# Patient Record
Sex: MALE | Race: WHITE | NOT HISPANIC OR LATINO | Employment: OTHER | URBAN - METROPOLITAN AREA
[De-identification: names, ages, dates, MRNs, and addresses within clinical notes are randomized per-mention and may not be internally consistent; named-entity substitution may affect disease eponyms.]

---

## 2017-03-06 ENCOUNTER — HOSPITAL ENCOUNTER (OUTPATIENT)
Dept: PAIN MEDICINE | Facility: HOSPITAL | Age: 67
Discharge: HOME | End: 2017-03-06
Attending: PAIN MEDICINE | Admitting: PAIN MEDICINE

## 2017-03-27 ENCOUNTER — HOSPITAL ENCOUNTER (OUTPATIENT)
Dept: PAIN MEDICINE | Facility: HOSPITAL | Age: 67
Discharge: HOME | End: 2017-03-27
Attending: PAIN MEDICINE | Admitting: PAIN MEDICINE

## 2017-04-10 ENCOUNTER — HOSPITAL ENCOUNTER (OUTPATIENT)
Dept: PAIN MEDICINE | Facility: HOSPITAL | Age: 67
Discharge: HOME | End: 2017-04-10
Attending: PAIN MEDICINE | Admitting: PAIN MEDICINE

## 2017-04-24 ENCOUNTER — HOSPITAL ENCOUNTER (OUTPATIENT)
Dept: PAIN MEDICINE | Facility: HOSPITAL | Age: 67
Discharge: HOME | End: 2017-04-24
Attending: PAIN MEDICINE | Admitting: PAIN MEDICINE

## 2017-05-08 ENCOUNTER — HOSPITAL ENCOUNTER (OUTPATIENT)
Dept: PAIN MEDICINE | Facility: HOSPITAL | Age: 67
Discharge: HOME | End: 2017-05-08
Attending: PAIN MEDICINE | Admitting: PAIN MEDICINE

## 2017-07-11 ENCOUNTER — HOSPITAL ENCOUNTER (OUTPATIENT)
Dept: FAMILY MEDICINE CLINIC | Facility: CLINIC | Age: 67
Setting detail: SPECIMEN
Discharge: HOME | End: 2017-07-11
Attending: INTERNAL MEDICINE | Admitting: INTERNAL MEDICINE

## 2017-07-11 LAB
ALBUMIN SERPL-MCNC: 4 G/DL (ref 3.5–4.8)
ALBUMIN/GLOB SERPL: 1.3 {RATIO} (ref 1–1.7)
ALP SERPL-CCNC: 61 IU/L (ref 32–91)
ALT SERPL-CCNC: 36 IU/L (ref 17–63)
ANION GAP SERPL CALC-SCNC: 15.5 MMOL/L (ref 10–20)
AST SERPL-CCNC: 33 IU/L (ref 15–41)
BASOPHILS # BLD AUTO: 0.1 10*3/UL (ref 0–0.2)
BASOPHILS NFR BLD AUTO: 1 % (ref 0–2)
BILIRUB SERPL-MCNC: 0.7 MG/DL (ref 0.3–1.2)
BUN SERPL-MCNC: 21 MG/DL (ref 8–20)
BUN/CREAT SERPL: 21 (ref 6.2–20.3)
CALCIUM SERPL-MCNC: 9.6 MG/DL (ref 8.9–10.3)
CHLORIDE SERPL-SCNC: 98 MMOL/L (ref 101–111)
CHOLEST SERPL-MCNC: 203 MG/DL
CHOLEST/HDLC SERPL: 5.1 {RATIO}
CONV CO2: 28 MMOL/L (ref 22–32)
CONV LDL CHOLESTEROL DIRECT: 116 MG/DL (ref 0–100)
CONV TOTAL PROTEIN: 7.2 G/DL (ref 6.1–7.9)
CREAT UR-MCNC: 1 MG/DL (ref 0.7–1.2)
DIFFERENTIAL METHOD BLD: (no result)
EOSINOPHIL # BLD AUTO: 0.3 10*3/UL (ref 0–0.3)
EOSINOPHIL # BLD AUTO: 4 % (ref 0–3)
ERYTHROCYTE [DISTWIDTH] IN BLOOD BY AUTOMATED COUNT: 18 % (ref 11.5–14.5)
GLOBULIN UR ELPH-MCNC: 3.2 G/DL (ref 2.5–3.8)
GLUCOSE SERPL-MCNC: 99 MG/DL (ref 65–99)
HCT VFR BLD AUTO: 37.7 % (ref 40–54)
HDLC SERPL-MCNC: 40 MG/DL
HGB BLD-MCNC: 12.8 G/DL (ref 14–18)
LDLC/HDLC SERPL: 2.9 {RATIO}
LIPID INTERPRETATION: ABNORMAL
LYMPHOCYTES # BLD AUTO: 1.8 10*3/UL (ref 0.8–4.8)
LYMPHOCYTES NFR BLD AUTO: 23 % (ref 18–42)
MCH RBC QN AUTO: 30 PG (ref 26–32)
MCHC RBC AUTO-ENTMCNC: 33.9 G/DL (ref 32–36)
MCV RBC AUTO: 88.5 FL (ref 80–94)
MONOCYTES # BLD AUTO: 0.8 10*3/UL (ref 0.1–1.3)
MONOCYTES NFR BLD AUTO: 10 % (ref 2–11)
NEUTROPHILS # BLD AUTO: 4.8 10*3/UL (ref 2.3–8.6)
NEUTROPHILS NFR BLD AUTO: 62 % (ref 50–75)
NRBC BLD AUTO-RTO: 0 /100{WBCS}
NRBC/RBC NFR BLD MANUAL: 0 10*3/UL
PLATELET # BLD AUTO: 209 10*3/UL (ref 150–450)
PMV BLD AUTO: 7.8 FL (ref 7.4–10.4)
POTASSIUM SERPL-SCNC: 4.5 MMOL/L (ref 3.6–5.1)
RBC # BLD AUTO: 4.25 10*6/UL (ref 4.6–6)
SODIUM SERPL-SCNC: 137 MMOL/L (ref 136–144)
T3 SERPL-MCNC: 0.99 NG/ML (ref 0.87–1.78)
T4 FREE SERPL-MCNC: 1.01 NG/DL (ref 0.58–1.64)
TRIGL SERPL-MCNC: 345 MG/DL
TSH SERPL-ACNC: 0.75 UIU/ML (ref 0.34–5.6)
VLDLC SERPL CALC-MCNC: 47 MG/DL
WBC # BLD AUTO: 7.7 10*3/UL (ref 4.5–11.5)

## 2017-08-02 ENCOUNTER — HOSPITAL ENCOUNTER (OUTPATIENT)
Dept: FAMILY MEDICINE CLINIC | Facility: CLINIC | Age: 67
Setting detail: SPECIMEN
Discharge: HOME | End: 2017-08-02
Attending: INTERNAL MEDICINE | Admitting: INTERNAL MEDICINE

## 2017-08-02 LAB
ALBUMIN SERPL-MCNC: 3.8 G/DL (ref 3.5–4.8)
ALBUMIN/GLOB SERPL: 1.2 {RATIO} (ref 1–1.7)
ALP SERPL-CCNC: 65 IU/L (ref 32–91)
ALT SERPL-CCNC: 42 IU/L (ref 17–63)
ANION GAP SERPL CALC-SCNC: 14.6 MMOL/L (ref 10–20)
AST SERPL-CCNC: 29 IU/L (ref 15–41)
BASOPHILS # BLD AUTO: 0 10*3/UL (ref 0–0.2)
BASOPHILS NFR BLD AUTO: 1 % (ref 0–2)
BILIRUB SERPL-MCNC: 0.8 MG/DL (ref 0.3–1.2)
BUN SERPL-MCNC: 22 MG/DL (ref 8–20)
BUN/CREAT SERPL: 22 (ref 6.2–20.3)
CALCIUM SERPL-MCNC: 9.4 MG/DL (ref 8.9–10.3)
CHLORIDE SERPL-SCNC: 99 MMOL/L (ref 101–111)
CONV CO2: 26 MMOL/L (ref 22–32)
CONV TOTAL PROTEIN: 6.9 G/DL (ref 6.1–7.9)
CREAT UR-MCNC: 1 MG/DL (ref 0.7–1.2)
DIFFERENTIAL METHOD BLD: (no result)
EOSINOPHIL # BLD AUTO: 0.2 10*3/UL (ref 0–0.3)
EOSINOPHIL # BLD AUTO: 2 % (ref 0–3)
ERYTHROCYTE [DISTWIDTH] IN BLOOD BY AUTOMATED COUNT: 17.2 % (ref 11.5–14.5)
GLOBULIN UR ELPH-MCNC: 3.1 G/DL (ref 2.5–3.8)
GLUCOSE SERPL-MCNC: 124 MG/DL (ref 65–99)
HCT VFR BLD AUTO: 38 % (ref 40–54)
HGB BLD-MCNC: 13 G/DL (ref 14–18)
LYMPHOCYTES # BLD AUTO: 1.6 10*3/UL (ref 0.8–4.8)
LYMPHOCYTES NFR BLD AUTO: 23 % (ref 18–42)
MCH RBC QN AUTO: 31.8 PG (ref 26–32)
MCHC RBC AUTO-ENTMCNC: 34.1 G/DL (ref 32–36)
MCV RBC AUTO: 93.3 FL (ref 80–94)
MONOCYTES # BLD AUTO: 0.7 10*3/UL (ref 0.1–1.3)
MONOCYTES NFR BLD AUTO: 9 % (ref 2–11)
NEUTROPHILS # BLD AUTO: 4.6 10*3/UL (ref 2.3–8.6)
NEUTROPHILS NFR BLD AUTO: 65 % (ref 50–75)
NRBC BLD AUTO-RTO: 0 /100{WBCS}
NRBC/RBC NFR BLD MANUAL: 0 10*3/UL
PLATELET # BLD AUTO: 199 10*3/UL (ref 150–450)
PMV BLD AUTO: 7.1 FL (ref 7.4–10.4)
POTASSIUM SERPL-SCNC: 4.6 MMOL/L (ref 3.6–5.1)
RBC # BLD AUTO: 4.08 10*6/UL (ref 4.6–6)
SODIUM SERPL-SCNC: 135 MMOL/L (ref 136–144)
T4 FREE SERPL-MCNC: 0.94 NG/DL (ref 0.58–1.64)
TESTOST SERPL-MCNC: 0.37 NG/ML (ref 1.7–7.8)
TSH SERPL-ACNC: 1.05 UIU/ML (ref 0.34–5.6)
WBC # BLD AUTO: 7.2 10*3/UL (ref 4.5–11.5)

## 2017-10-03 ENCOUNTER — HOSPITAL ENCOUNTER (OUTPATIENT)
Dept: URGENT CARE | Facility: CLINIC | Age: 67
Discharge: HOME | End: 2017-10-03
Attending: FAMILY MEDICINE | Admitting: FAMILY MEDICINE

## 2017-12-28 ENCOUNTER — HOSPITAL ENCOUNTER (OUTPATIENT)
Dept: FAMILY MEDICINE CLINIC | Facility: CLINIC | Age: 67
Setting detail: SPECIMEN
Discharge: HOME | End: 2017-12-28
Attending: INTERNAL MEDICINE | Admitting: INTERNAL MEDICINE

## 2017-12-28 LAB
ALBUMIN SERPL-MCNC: 3.7 G/DL (ref 3.5–4.8)
ALBUMIN/GLOB SERPL: 1.2 {RATIO} (ref 1–1.7)
ALP SERPL-CCNC: 68 IU/L (ref 32–91)
ALT SERPL-CCNC: 24 IU/L (ref 17–63)
ANION GAP SERPL CALC-SCNC: 14.5 MMOL/L (ref 10–20)
AST SERPL-CCNC: 27 IU/L (ref 15–41)
BASOPHILS # BLD AUTO: 0 10*3/UL (ref 0–0.2)
BASOPHILS NFR BLD AUTO: 1 % (ref 0–2)
BILIRUB SERPL-MCNC: 0.5 MG/DL (ref 0.3–1.2)
BUN SERPL-MCNC: 19 MG/DL (ref 8–20)
BUN/CREAT SERPL: 15.8 (ref 6.2–20.3)
CALCIUM SERPL-MCNC: 9.4 MG/DL (ref 8.9–10.3)
CHLORIDE SERPL-SCNC: 98 MMOL/L (ref 101–111)
CHOLEST SERPL-MCNC: 172 MG/DL
CHOLEST/HDLC SERPL: 5.2 {RATIO}
CONV CO2: 27 MMOL/L (ref 22–32)
CONV LDL CHOLESTEROL DIRECT: 102 MG/DL (ref 0–100)
CONV TOTAL PROTEIN: 6.9 G/DL (ref 6.1–7.9)
CREAT UR-MCNC: 1.2 MG/DL (ref 0.7–1.2)
DIFFERENTIAL METHOD BLD: (no result)
EOSINOPHIL # BLD AUTO: 0.2 10*3/UL (ref 0–0.3)
EOSINOPHIL # BLD AUTO: 2 % (ref 0–3)
ERYTHROCYTE [DISTWIDTH] IN BLOOD BY AUTOMATED COUNT: 18.3 % (ref 11.5–14.5)
GLOBULIN UR ELPH-MCNC: 3.2 G/DL (ref 2.5–3.8)
GLUCOSE SERPL-MCNC: 156 MG/DL (ref 65–99)
HCT VFR BLD AUTO: 38.4 % (ref 40–54)
HDLC SERPL-MCNC: 33 MG/DL
HGB BLD-MCNC: 12 G/DL (ref 14–18)
LDLC/HDLC SERPL: 3.1 {RATIO}
LIPID INTERPRETATION: ABNORMAL
LYMPHOCYTES # BLD AUTO: 1.4 10*3/UL (ref 0.8–4.8)
LYMPHOCYTES NFR BLD AUTO: 19 % (ref 18–42)
MCH RBC QN AUTO: 23.6 PG (ref 26–32)
MCHC RBC AUTO-ENTMCNC: 31.2 G/DL (ref 32–36)
MCV RBC AUTO: 75.7 FL (ref 80–94)
MONOCYTES # BLD AUTO: 0.7 10*3/UL (ref 0.1–1.3)
MONOCYTES NFR BLD AUTO: 9 % (ref 2–11)
NEUTROPHILS # BLD AUTO: 5.1 10*3/UL (ref 2.3–8.6)
NEUTROPHILS NFR BLD AUTO: 69 % (ref 50–75)
NRBC BLD AUTO-RTO: 0 /100{WBCS}
NRBC/RBC NFR BLD MANUAL: 0 10*3/UL
PLATELET # BLD AUTO: 300 10*3/UL (ref 150–450)
PMV BLD AUTO: 7.4 FL (ref 7.4–10.4)
POTASSIUM SERPL-SCNC: 4.5 MMOL/L (ref 3.6–5.1)
RBC # BLD AUTO: 5.07 10*6/UL (ref 4.6–6)
SODIUM SERPL-SCNC: 135 MMOL/L (ref 136–144)
TRIGL SERPL-MCNC: 266 MG/DL
VLDLC SERPL CALC-MCNC: 37.3 MG/DL
WBC # BLD AUTO: 7.4 10*3/UL (ref 4.5–11.5)

## 2018-03-06 ENCOUNTER — HOSPITAL ENCOUNTER (OUTPATIENT)
Dept: FAMILY MEDICINE CLINIC | Facility: CLINIC | Age: 68
Setting detail: SPECIMEN
Discharge: HOME | End: 2018-03-06
Attending: HOSPITALIST | Admitting: HOSPITALIST

## 2018-06-18 ENCOUNTER — HOSPITAL ENCOUNTER (OUTPATIENT)
Dept: FAMILY MEDICINE CLINIC | Facility: CLINIC | Age: 68
Setting detail: SPECIMEN
Discharge: HOME | End: 2018-06-18
Attending: HOSPITALIST | Admitting: HOSPITALIST

## 2018-06-18 LAB
BASOPHILS # BLD AUTO: 0 10*3/UL (ref 0–0.2)
BASOPHILS NFR BLD AUTO: 1 % (ref 0–2)
DIFFERENTIAL METHOD BLD: (no result)
EOSINOPHIL # BLD AUTO: 0.3 10*3/UL (ref 0–0.3)
EOSINOPHIL # BLD AUTO: 5 % (ref 0–3)
ERYTHROCYTE [DISTWIDTH] IN BLOOD BY AUTOMATED COUNT: 16.4 % (ref 11.5–14.5)
HCT VFR BLD AUTO: 37.7 % (ref 40–54)
HGB BLD-MCNC: 12.5 G/DL (ref 14–18)
LYMPHOCYTES # BLD AUTO: 0.9 10*3/UL (ref 0.8–4.8)
LYMPHOCYTES NFR BLD AUTO: 14 % (ref 18–42)
MCH RBC QN AUTO: 28.9 PG (ref 26–32)
MCHC RBC AUTO-ENTMCNC: 33.3 G/DL (ref 32–36)
MCV RBC AUTO: 86.8 FL (ref 80–94)
MONOCYTES # BLD AUTO: 0.7 10*3/UL (ref 0.1–1.3)
MONOCYTES NFR BLD AUTO: 10 % (ref 2–11)
NEUTROPHILS # BLD AUTO: 4.9 10*3/UL (ref 2.3–8.6)
NEUTROPHILS NFR BLD AUTO: 70 % (ref 50–75)
NRBC BLD AUTO-RTO: 0 /100{WBCS}
NRBC/RBC NFR BLD MANUAL: 0 10*3/UL
PLATELET # BLD AUTO: 269 10*3/UL (ref 150–450)
PMV BLD AUTO: 7.9 FL (ref 7.4–10.4)
RBC # BLD AUTO: 4.34 10*6/UL (ref 4.6–6)
WBC # BLD AUTO: 6.8 10*3/UL (ref 4.5–11.5)

## 2018-06-28 ENCOUNTER — HOSPITAL ENCOUNTER (OUTPATIENT)
Dept: FAMILY MEDICINE CLINIC | Facility: CLINIC | Age: 68
Setting detail: SPECIMEN
Discharge: HOME | End: 2018-06-28
Attending: INTERNAL MEDICINE | Admitting: INTERNAL MEDICINE

## 2018-06-28 LAB
ALBUMIN SERPL-MCNC: 3.8 G/DL (ref 3.5–4.8)
ALBUMIN/GLOB SERPL: 1.2 {RATIO} (ref 1–1.7)
ALP SERPL-CCNC: 61 IU/L (ref 32–91)
ALT SERPL-CCNC: 22 IU/L (ref 17–63)
ANION GAP SERPL CALC-SCNC: 13.2 MMOL/L (ref 10–20)
AST SERPL-CCNC: 28 IU/L (ref 15–41)
BILIRUB SERPL-MCNC: 0.5 MG/DL (ref 0.3–1.2)
BUN SERPL-MCNC: 20 MG/DL (ref 8–20)
BUN/CREAT SERPL: 16.7 (ref 6.2–20.3)
CALCIUM SERPL-MCNC: 9.3 MG/DL (ref 8.9–10.3)
CHLORIDE SERPL-SCNC: 99 MMOL/L (ref 101–111)
CHOLEST SERPL-MCNC: 118 MG/DL
CHOLEST/HDLC SERPL: 3.9 {RATIO}
CONV CO2: 29 MMOL/L (ref 22–32)
CONV LDL CHOLESTEROL DIRECT: 66 MG/DL (ref 0–100)
CONV TOTAL PROTEIN: 7 G/DL (ref 6.1–7.9)
CREAT UR-MCNC: 1.2 MG/DL (ref 0.7–1.2)
GLOBULIN UR ELPH-MCNC: 3.2 G/DL (ref 2.5–3.8)
GLUCOSE SERPL-MCNC: 103 MG/DL (ref 65–99)
HDLC SERPL-MCNC: 30 MG/DL
LDLC/HDLC SERPL: 2.2 {RATIO}
LIPID INTERPRETATION: ABNORMAL
POTASSIUM SERPL-SCNC: 4.2 MMOL/L (ref 3.6–5.1)
SODIUM SERPL-SCNC: 137 MMOL/L (ref 136–144)
TRIGL SERPL-MCNC: 125 MG/DL
VLDLC SERPL CALC-MCNC: 22.2 MG/DL

## 2018-08-23 ENCOUNTER — HOSPITAL ENCOUNTER (OUTPATIENT)
Dept: FAMILY MEDICINE CLINIC | Facility: CLINIC | Age: 68
Setting detail: SPECIMEN
Discharge: HOME | End: 2018-08-23
Attending: HOSPITALIST | Admitting: HOSPITALIST

## 2018-08-23 LAB
ALBUMIN SERPL-MCNC: 4.7 G/DL (ref 3.5–4.8)
ALBUMIN/GLOB SERPL: 1.5 {RATIO} (ref 1–1.7)
ALP SERPL-CCNC: 78 IU/L (ref 32–91)
ALT SERPL-CCNC: 95 IU/L (ref 17–63)
ANION GAP SERPL CALC-SCNC: 15.6 MMOL/L (ref 10–20)
AST SERPL-CCNC: 47 IU/L (ref 15–41)
BASOPHILS # BLD AUTO: 0.1 10*3/UL (ref 0–0.2)
BASOPHILS NFR BLD AUTO: 1 % (ref 0–2)
BILIRUB SERPL-MCNC: 0.7 MG/DL (ref 0.3–1.2)
BUN SERPL-MCNC: 30 MG/DL (ref 8–20)
BUN/CREAT SERPL: 23.1 (ref 6.2–20.3)
CALCIUM SERPL-MCNC: 9.5 MG/DL (ref 8.9–10.3)
CHLORIDE SERPL-SCNC: 95 MMOL/L (ref 101–111)
CONV CO2: 30 MMOL/L (ref 22–32)
CONV TOTAL PROTEIN: 7.8 G/DL (ref 6.1–7.9)
CREAT UR-MCNC: 1.3 MG/DL (ref 0.7–1.2)
DIFFERENTIAL METHOD BLD: (no result)
EOSINOPHIL # BLD AUTO: 0.1 10*3/UL (ref 0–0.3)
EOSINOPHIL # BLD AUTO: 1 % (ref 0–3)
ERYTHROCYTE [DISTWIDTH] IN BLOOD BY AUTOMATED COUNT: 18.8 % (ref 11.5–14.5)
GLOBULIN UR ELPH-MCNC: 3.1 G/DL (ref 2.5–3.8)
GLUCOSE SERPL-MCNC: 148 MG/DL (ref 65–99)
HCT VFR BLD AUTO: 44.5 % (ref 40–54)
HGB BLD-MCNC: 14.5 G/DL (ref 14–18)
LYMPHOCYTES # BLD AUTO: 1.6 10*3/UL (ref 0.8–4.8)
LYMPHOCYTES NFR BLD AUTO: 16 % (ref 18–42)
MCH RBC QN AUTO: 27.3 PG (ref 26–32)
MCHC RBC AUTO-ENTMCNC: 32.5 G/DL (ref 32–36)
MCV RBC AUTO: 84.1 FL (ref 80–94)
MONOCYTES # BLD AUTO: 1 10*3/UL (ref 0.1–1.3)
MONOCYTES NFR BLD AUTO: 10 % (ref 2–11)
NEUTROPHILS # BLD AUTO: 7.2 10*3/UL (ref 2.3–8.6)
NEUTROPHILS NFR BLD AUTO: 72 % (ref 50–75)
NRBC BLD AUTO-RTO: 0 /100{WBCS}
NRBC/RBC NFR BLD MANUAL: 0 10*3/UL
PLATELET # BLD AUTO: 330 10*3/UL (ref 150–450)
PMV BLD AUTO: 8.4 FL (ref 7.4–10.4)
POTASSIUM SERPL-SCNC: 4.6 MMOL/L (ref 3.6–5.1)
RBC # BLD AUTO: 5.29 10*6/UL (ref 4.6–6)
SODIUM SERPL-SCNC: 136 MMOL/L (ref 136–144)
WBC # BLD AUTO: 10 10*3/UL (ref 4.5–11.5)

## 2018-08-24 ENCOUNTER — HOSPITAL ENCOUNTER (OUTPATIENT)
Dept: MRI IMAGING | Facility: HOSPITAL | Age: 68
Discharge: HOME | End: 2018-08-24
Attending: HOSPITALIST | Admitting: HOSPITALIST

## 2018-09-22 ENCOUNTER — LAB REQUISITION (OUTPATIENT)
Dept: LAB | Facility: HOSPITAL | Age: 68
End: 2018-09-22

## 2018-09-22 DIAGNOSIS — Z00.00 ROUTINE GENERAL MEDICAL EXAMINATION AT A HEALTH CARE FACILITY: ICD-10-CM

## 2018-09-22 LAB
BILIRUB UR QL STRIP: NEGATIVE
CLARITY UR: ABNORMAL
COLOR UR: YELLOW
GLUCOSE UR STRIP-MCNC: NEGATIVE MG/DL
HGB UR QL STRIP.AUTO: NEGATIVE
KETONES UR QL STRIP: NEGATIVE
LEUKOCYTE ESTERASE UR QL STRIP.AUTO: NEGATIVE
NITRITE UR QL STRIP: NEGATIVE
PH UR STRIP.AUTO: <=5 [PH] (ref 5–8)
PROT UR QL STRIP: NEGATIVE
SP GR UR STRIP: 1.03 (ref 1–1.03)
UROBILINOGEN UR QL STRIP: ABNORMAL

## 2018-09-22 PROCEDURE — 81003 URINALYSIS AUTO W/O SCOPE: CPT

## 2018-11-06 ENCOUNTER — HOSPITAL ENCOUNTER (OUTPATIENT)
Dept: FAMILY MEDICINE CLINIC | Facility: CLINIC | Age: 68
Setting detail: SPECIMEN
Discharge: HOME | End: 2018-11-06
Attending: INTERNAL MEDICINE | Admitting: INTERNAL MEDICINE

## 2018-11-06 LAB
ALBUMIN SERPL-MCNC: 3.8 G/DL (ref 3.5–4.8)
ALBUMIN/GLOB SERPL: 1.2 {RATIO} (ref 1–1.7)
ALP SERPL-CCNC: 101 IU/L (ref 32–91)
ALT SERPL-CCNC: 31 IU/L (ref 17–63)
ANION GAP SERPL CALC-SCNC: 14.3 MMOL/L (ref 10–20)
AST SERPL-CCNC: 33 IU/L (ref 15–41)
BASOPHILS # BLD AUTO: 0 10*3/UL (ref 0–0.2)
BASOPHILS NFR BLD AUTO: 1 % (ref 0–2)
BILIRUB SERPL-MCNC: 0.4 MG/DL (ref 0.3–1.2)
BUN SERPL-MCNC: 15 MG/DL (ref 8–20)
BUN/CREAT SERPL: 15 (ref 6.2–20.3)
CALCIUM SERPL-MCNC: 9.7 MG/DL (ref 8.9–10.3)
CHLORIDE SERPL-SCNC: 102 MMOL/L (ref 101–111)
CHOLEST SERPL-MCNC: 155 MG/DL
CHOLEST/HDLC SERPL: 4.1 {RATIO}
CONV CO2: 27 MMOL/L (ref 22–32)
CONV LDL CHOLESTEROL DIRECT: 97 MG/DL (ref 0–100)
CONV MICROALBUM.,U,RANDOM: 3 MG/L
CONV TOTAL PROTEIN: 7 G/DL (ref 6.1–7.9)
CREAT 24H UR-MCNC: 53.6 MG/DL
CREAT UR-MCNC: 1 MG/DL (ref 0.7–1.2)
DIFFERENTIAL METHOD BLD: (no result)
EOSINOPHIL # BLD AUTO: 0.3 10*3/UL (ref 0–0.3)
EOSINOPHIL # BLD AUTO: 5 % (ref 0–3)
ERYTHROCYTE [DISTWIDTH] IN BLOOD BY AUTOMATED COUNT: 19.4 % (ref 11.5–14.5)
GLOBULIN UR ELPH-MCNC: 3.2 G/DL (ref 2.5–3.8)
GLUCOSE SERPL-MCNC: 142 MG/DL (ref 65–99)
HCT VFR BLD AUTO: 29.8 % (ref 40–54)
HDLC SERPL-MCNC: 38 MG/DL
HGB BLD-MCNC: 10.2 G/DL (ref 14–18)
LDLC/HDLC SERPL: 2.5 {RATIO}
LIPID INTERPRETATION: ABNORMAL
LYMPHOCYTES # BLD AUTO: 1.2 10*3/UL (ref 0.8–4.8)
LYMPHOCYTES NFR BLD AUTO: 17 % (ref 18–42)
MCH RBC QN AUTO: 26.8 PG (ref 26–32)
MCHC RBC AUTO-ENTMCNC: 34.2 G/DL (ref 32–36)
MCV RBC AUTO: 78.4 FL (ref 80–94)
MICROALBUMIN/CREAT UR: 5.6 UG/MG
MONOCYTES # BLD AUTO: 0.5 10*3/UL (ref 0.1–1.3)
MONOCYTES NFR BLD AUTO: 7 % (ref 2–11)
NEUTROPHILS # BLD AUTO: 5.1 10*3/UL (ref 2.3–8.6)
NEUTROPHILS NFR BLD AUTO: 70 % (ref 50–75)
NRBC BLD AUTO-RTO: 0 /100{WBCS}
NRBC/RBC NFR BLD MANUAL: 0 10*3/UL
PLATELET # BLD AUTO: 374 10*3/UL (ref 150–450)
PMV BLD AUTO: 7.1 FL (ref 7.4–10.4)
POTASSIUM SERPL-SCNC: 4.3 MMOL/L (ref 3.6–5.1)
RBC # BLD AUTO: 3.8 10*6/UL (ref 4.6–6)
SODIUM SERPL-SCNC: 139 MMOL/L (ref 136–144)
TRIGL SERPL-MCNC: 241 MG/DL
VLDLC SERPL CALC-MCNC: 20.3 MG/DL
WBC # BLD AUTO: 7.2 10*3/UL (ref 4.5–11.5)

## 2018-12-12 ENCOUNTER — HOSPITAL ENCOUNTER (OUTPATIENT)
Dept: FAMILY MEDICINE CLINIC | Facility: CLINIC | Age: 68
Setting detail: SPECIMEN
Discharge: HOME | End: 2018-12-12
Attending: HOSPITALIST | Admitting: HOSPITALIST

## 2018-12-12 ENCOUNTER — HOSPITAL ENCOUNTER (OUTPATIENT)
Dept: CT IMAGING | Facility: HOSPITAL | Age: 68
Discharge: HOME | End: 2018-12-12
Attending: NURSE PRACTITIONER | Admitting: NURSE PRACTITIONER

## 2018-12-12 LAB
ALBUMIN SERPL-MCNC: 3.3 G/DL (ref 3.5–4.8)
ALBUMIN/GLOB SERPL: 1 {RATIO} (ref 1–1.7)
ALP SERPL-CCNC: 70 IU/L (ref 32–91)
ALT SERPL-CCNC: 16 IU/L (ref 17–63)
ANION GAP SERPL CALC-SCNC: 12.6 MMOL/L (ref 10–20)
AST SERPL-CCNC: 22 IU/L (ref 15–41)
BASOPHILS # BLD AUTO: 0.1 10*3/UL (ref 0–0.2)
BASOPHILS NFR BLD AUTO: 1 % (ref 0–2)
BILIRUB SERPL-MCNC: 0.3 MG/DL (ref 0.3–1.2)
BNP SERPL-MCNC: 410 PG/ML
BUN SERPL-MCNC: 17 MG/DL (ref 8–20)
BUN/CREAT SERPL: 15.5 (ref 6.2–20.3)
CALCIUM SERPL-MCNC: 9 MG/DL (ref 8.9–10.3)
CHLORIDE SERPL-SCNC: 100 MMOL/L (ref 101–111)
CONV CO2: 27 MMOL/L (ref 22–32)
CONV TOTAL PROTEIN: 6.7 G/DL (ref 6.1–7.9)
CREAT BLDA-MCNC: 1 MG/DL (ref 0.6–1.3)
CREAT UR-MCNC: 1.1 MG/DL (ref 0.7–1.2)
DIFFERENTIAL METHOD BLD: (no result)
EOSINOPHIL # BLD AUTO: 0.1 10*3/UL (ref 0–0.3)
EOSINOPHIL # BLD AUTO: 2 % (ref 0–3)
ERYTHROCYTE [DISTWIDTH] IN BLOOD BY AUTOMATED COUNT: 21.1 % (ref 11.5–14.5)
GLOBULIN UR ELPH-MCNC: 3.4 G/DL (ref 2.5–3.8)
GLUCOSE SERPL-MCNC: 125 MG/DL (ref 65–99)
HCT VFR BLD AUTO: 26.1 % (ref 40–54)
HGB BLD-MCNC: 8.3 G/DL (ref 14–18)
LYMPHOCYTES # BLD AUTO: 0.7 10*3/UL (ref 0.8–4.8)
LYMPHOCYTES NFR BLD AUTO: 13 % (ref 18–42)
MCH RBC QN AUTO: 24.1 PG (ref 26–32)
MCHC RBC AUTO-ENTMCNC: 31.8 G/DL (ref 32–36)
MCV RBC AUTO: 75.9 FL (ref 80–94)
MONOCYTES # BLD AUTO: 0.7 10*3/UL (ref 0.1–1.3)
MONOCYTES NFR BLD AUTO: 13 % (ref 2–11)
NEUTROPHILS # BLD AUTO: 4 10*3/UL (ref 2.3–8.6)
NEUTROPHILS NFR BLD AUTO: 71 % (ref 50–75)
NRBC BLD AUTO-RTO: 0 /100{WBCS}
NRBC/RBC NFR BLD MANUAL: 0 10*3/UL
PLATELET # BLD AUTO: 458 10*3/UL (ref 150–450)
PMV BLD AUTO: 7.2 FL (ref 7.4–10.4)
POTASSIUM SERPL-SCNC: 3.6 MMOL/L (ref 3.6–5.1)
RBC # BLD AUTO: 3.44 10*6/UL (ref 4.6–6)
SODIUM SERPL-SCNC: 136 MMOL/L (ref 136–144)
TESTOST SERPL-MCNC: 5.83 NG/ML (ref 1.7–7.8)
WBC # BLD AUTO: 5.6 10*3/UL (ref 4.5–11.5)

## 2018-12-19 ENCOUNTER — HOSPITAL ENCOUNTER (OUTPATIENT)
Dept: OTHER | Facility: HOSPITAL | Age: 68
Discharge: HOME | End: 2018-12-19
Attending: INTERNAL MEDICINE | Admitting: INTERNAL MEDICINE

## 2018-12-19 LAB
ANION GAP SERPL CALC-SCNC: 15.4 MMOL/L (ref 10–20)
BASOPHILS # BLD AUTO: 0 10*3/UL (ref 0–0.2)
BASOPHILS NFR BLD AUTO: 1 % (ref 0–2)
BNP SERPL-MCNC: 479 PG/ML
BUN SERPL-MCNC: 16 MG/DL (ref 8–20)
BUN/CREAT SERPL: 14.5 (ref 6.2–20.3)
CALCIUM SERPL-MCNC: 8.8 MG/DL (ref 8.9–10.3)
CHLORIDE SERPL-SCNC: 93 MMOL/L (ref 101–111)
CONV CO2: 29 MMOL/L (ref 22–32)
CREAT UR-MCNC: 1.1 MG/DL (ref 0.7–1.2)
DIFFERENTIAL METHOD BLD: (no result)
EOSINOPHIL # BLD AUTO: 0.2 10*3/UL (ref 0–0.3)
EOSINOPHIL # BLD AUTO: 3 % (ref 0–3)
ERYTHROCYTE [DISTWIDTH] IN BLOOD BY AUTOMATED COUNT: 22.7 % (ref 11.5–14.5)
GLUCOSE SERPL-MCNC: 94 MG/DL (ref 65–99)
HCT VFR BLD AUTO: 29.7 % (ref 40–54)
HGB BLD-MCNC: 9 G/DL (ref 14–18)
LYMPHOCYTES # BLD AUTO: 1 10*3/UL (ref 0.8–4.8)
LYMPHOCYTES NFR BLD AUTO: 13 % (ref 18–42)
MAGNESIUM SERPL-MCNC: 1.8 MG/DL (ref 1.8–2.5)
MCH RBC QN AUTO: 23.8 PG (ref 26–32)
MCHC RBC AUTO-ENTMCNC: 30.5 G/DL (ref 32–36)
MCV RBC AUTO: 77.9 FL (ref 80–94)
MONOCYTES # BLD AUTO: 0.9 10*3/UL (ref 0.1–1.3)
MONOCYTES NFR BLD AUTO: 12 % (ref 2–11)
NEUTROPHILS # BLD AUTO: 5.5 10*3/UL (ref 2.3–8.6)
NEUTROPHILS NFR BLD AUTO: 71 % (ref 50–75)
NRBC BLD AUTO-RTO: 0 /100{WBCS}
NRBC/RBC NFR BLD MANUAL: 0 10*3/UL
PLATELET # BLD AUTO: 363 10*3/UL (ref 150–450)
PMV BLD AUTO: 7 FL (ref 7.4–10.4)
POTASSIUM SERPL-SCNC: 3.4 MMOL/L (ref 3.6–5.1)
RBC # BLD AUTO: 3.81 10*6/UL (ref 4.6–6)
SODIUM SERPL-SCNC: 134 MMOL/L (ref 136–144)
TSH SERPL-ACNC: 2.83 UIU/ML (ref 0.34–5.6)
WBC # BLD AUTO: 7.7 10*3/UL (ref 4.5–11.5)

## 2018-12-26 ENCOUNTER — OUTSIDE FACILITY SERVICE (OUTPATIENT)
Dept: CARDIAC SURGERY | Facility: CLINIC | Age: 68
End: 2018-12-26

## 2018-12-26 PROCEDURE — 33521 CABG ARTERY-VEIN FOUR: CPT | Performed by: PHYSICIAN ASSISTANT

## 2018-12-26 PROCEDURE — 33508 ENDOSCOPIC VEIN HARVEST: CPT | Performed by: PHYSICIAN ASSISTANT

## 2018-12-26 PROCEDURE — 33521 CABG ARTERY-VEIN FOUR: CPT | Performed by: THORACIC SURGERY (CARDIOTHORACIC VASCULAR SURGERY)

## 2018-12-26 PROCEDURE — 33508 ENDOSCOPIC VEIN HARVEST: CPT | Performed by: THORACIC SURGERY (CARDIOTHORACIC VASCULAR SURGERY)

## 2018-12-26 PROCEDURE — 33533 CABG ARTERIAL SINGLE: CPT | Performed by: PHYSICIAN ASSISTANT

## 2018-12-26 PROCEDURE — 33533 CABG ARTERIAL SINGLE: CPT | Performed by: THORACIC SURGERY (CARDIOTHORACIC VASCULAR SURGERY)

## 2018-12-27 ENCOUNTER — HOSPITAL ENCOUNTER (OUTPATIENT)
Dept: CARDIOLOGY | Facility: HOSPITAL | Age: 68
End: 2018-12-27
Attending: INTERNAL MEDICINE | Admitting: INTERNAL MEDICINE

## 2019-01-21 ENCOUNTER — OFFICE VISIT (OUTPATIENT)
Dept: CARDIAC SURGERY | Facility: CLINIC | Age: 69
End: 2019-01-21

## 2019-01-21 VITALS
OXYGEN SATURATION: 98 % | DIASTOLIC BLOOD PRESSURE: 88 MMHG | SYSTOLIC BLOOD PRESSURE: 126 MMHG | BODY MASS INDEX: 27.85 KG/M2 | RESPIRATION RATE: 20 BRPM | HEIGHT: 72 IN | TEMPERATURE: 98 F | WEIGHT: 205.6 LBS | HEART RATE: 67 BPM

## 2019-01-21 DIAGNOSIS — Z95.1 S/P CABG (CORONARY ARTERY BYPASS GRAFT): Primary | ICD-10-CM

## 2019-01-21 PROCEDURE — 99024 POSTOP FOLLOW-UP VISIT: CPT | Performed by: THORACIC SURGERY (CARDIOTHORACIC VASCULAR SURGERY)

## 2019-01-21 RX ORDER — FLUTICASONE PROPIONATE 220 UG/1
AEROSOL, METERED RESPIRATORY (INHALATION)
COMMUNITY
Start: 2019-01-08 | End: 2019-11-22

## 2019-01-21 RX ORDER — OLOPATADINE HYDROCHLORIDE 2 MG/ML
SOLUTION/ DROPS OPHTHALMIC
COMMUNITY
Start: 2019-01-14 | End: 2019-08-12 | Stop reason: SDUPTHER

## 2019-01-21 RX ORDER — POTASSIUM CHLORIDE 1500 MG/1
TABLET, FILM COATED, EXTENDED RELEASE ORAL
COMMUNITY
Start: 2019-01-01 | End: 2019-01-21 | Stop reason: ALTCHOICE

## 2019-01-21 RX ORDER — OXYCODONE AND ACETAMINOPHEN 10; 325 MG/1; MG/1
TABLET ORAL
COMMUNITY
Start: 2018-12-27 | End: 2019-01-21

## 2019-01-21 RX ORDER — ASPIRIN 325 MG
81 TABLET, DELAYED RELEASE (ENTERIC COATED) ORAL EVERY 6 HOURS PRN
COMMUNITY
End: 2020-07-17

## 2019-01-21 RX ORDER — BUMETANIDE 1 MG/1
1 TABLET ORAL 2 TIMES DAILY
COMMUNITY
Start: 2019-01-01 | End: 2019-08-12

## 2019-01-21 NOTE — PROGRESS NOTES
Arik August is a 68 y.o. male s/p CABG. He denies any signs or symptoms of myocardial ischemia, cerebrovascular event, or infection. He reports good exercise tolerance.  On questioning, he admitted that he is not using Betadine to clean his incision (as is my routine), nor is he using his chest hugger vest (as is my routine).  He also admits to soaking his incision with soap and water.  He also believes he has had a couple of incidents of sternal popping or clicking when getting out of bed to a sitting position, no more than 2 or 3 times total since his operation.  He is quite insistent that he is not lifting any heavy objects and is not driving.    Sternum is stable, incision is clean and dry.  The lower part of his incision still covered with a scab.  He still has significant sternal tenderness.   CTA B/L.   Lower extremity incisions are clean, dry and intact.  GCS 15, no neurologic deficit.    He was advised to practice strict sternal precautions; it is not clear to me he has been doing so up to this point.  We also supplied him with Betadine and 2 x 2 gauzes for application to his sternal incision BID.  I would like him to use his chest hugger vest for now.      Follow up with us will be in 1 month to reassess his sternum.

## 2019-02-01 ENCOUNTER — HOSPITAL ENCOUNTER (OUTPATIENT)
Dept: RESPIRATORY THERAPY | Facility: HOSPITAL | Age: 69
Discharge: HOME | End: 2019-02-01
Attending: NURSE PRACTITIONER | Admitting: NURSE PRACTITIONER

## 2019-02-05 ENCOUNTER — TELEPHONE (OUTPATIENT)
Dept: CARDIAC SURGERY | Facility: CLINIC | Age: 69
End: 2019-02-05

## 2019-02-05 NOTE — TELEPHONE ENCOUNTER
The nurse Cheo from [facility] called to report that patient's distal portion of sternal incision is red from pt recently popping a pimple-like mass on the incision yesterday. The patient reports that after he popped the area white-thickish drainage oozed from the site and he then cleaned and applied Betadine to the area. Since then, the [place] health nurse states the area appears just red without any other complications (no odor, no fever, no pain). Does he need to come in to have it checked out? Please advise. Home health nurse's number is [phone]. Thanks!

## 2019-02-07 ENCOUNTER — OFFICE VISIT (OUTPATIENT)
Dept: CARDIAC SURGERY | Facility: CLINIC | Age: 69
End: 2019-02-07

## 2019-02-07 VITALS
HEART RATE: 93 BPM | WEIGHT: 211.4 LBS | OXYGEN SATURATION: 98 % | DIASTOLIC BLOOD PRESSURE: 74 MMHG | HEIGHT: 72 IN | SYSTOLIC BLOOD PRESSURE: 132 MMHG | BODY MASS INDEX: 28.63 KG/M2 | RESPIRATION RATE: 20 BRPM | TEMPERATURE: 98.4 F

## 2019-02-07 DIAGNOSIS — Z09 FOLLOW UP: Primary | ICD-10-CM

## 2019-02-07 PROBLEM — I50.20 HFREF (HEART FAILURE WITH REDUCED EJECTION FRACTION): Status: ACTIVE | Noted: 2019-02-07

## 2019-02-07 PROBLEM — Z95.1 S/P CABG X 5: Status: ACTIVE | Noted: 2019-02-07

## 2019-02-07 PROBLEM — I25.5 ISCHEMIC CARDIOMYOPATHY: Status: ACTIVE | Noted: 2019-02-07

## 2019-02-07 PROBLEM — I25.10 CAD (CORONARY ARTERY DISEASE): Status: ACTIVE | Noted: 2019-02-07

## 2019-02-07 PROBLEM — E11.9 TYPE 2 DIABETES MELLITUS: Status: ACTIVE | Noted: 2019-02-07

## 2019-02-07 PROCEDURE — 99024 POSTOP FOLLOW-UP VISIT: CPT | Performed by: NURSE PRACTITIONER

## 2019-02-07 RX ORDER — CEPHALEXIN 250 MG/1
500 CAPSULE ORAL 2 TIMES DAILY
Qty: 28 CAPSULE | Refills: 0 | OUTPATIENT
Start: 2019-02-07 | End: 2019-02-14

## 2019-02-07 NOTE — PROGRESS NOTES
"CARDIOVASCULAR SURGERY FOLLOW-UP PROGRESS NOTE  Chief Complaint: Wound check        HPI:   Dear Dr. Harvey, Provider Not In and colleagues:    It was nice to see Arik August in follow up today after cardiac surgery.  As you know, he is a 68 y.o. male with DMII, ICM, HFrEF, CAD who underwent CABG at [facility] by Dr. Sergio Aragon on 12/26/18. He did well postoperatively and continues to do well. He comes in today complaining of drainage from sternal incision. He states he had a small \"pimple\" appear Monday morning that had a small amount of purulent drainage. There has been no drainage from the incision today. The distal portion of the sternal incision is not draining and is nontender. It is slightly erythematous.  He denies any chills or fevers. I prescribed him Keflex 500mg Bid for 1 week. He is to keep his scheduled appointment with Dr. Aragon for follow up on 2/18. He understands that he is to notify our office should the wound continue to drain, have an increase in redness/tenderness, or should he develop chills/fevers.  His activity level has been good and he is otherwise pleased with his postoperative progress. He states he feels he is able to breathe easier than prior to surgery but is still feeling a little weak.    Physical Exam:         /74 (BP Location: Left arm, Patient Position: Sitting)   Pulse 93   Temp 98.4 °F (36.9 °C) (Oral)   Resp 20   Ht 182.9 cm (72\")   Wt 95.9 kg (211 lb 6.4 oz)   SpO2 98%   BMI 28.67 kg/m²   Heart:  regular rate and rhythm, S1, S2 normal, no murmur, click, rub or gallop  Lungs:  clear to auscultation bilaterally  Extremities:  no edema  Incision(s):  mid chest no significant drainage, distal portion of sternum is slightly erythematous and nontender    Assessment/Plan:     S/P CABG. Overall, he is doing well.    No significant post-op complications    No heavy lifting > 10 pounds for 2 more weeks  Keep incisions clean and dry  Follow-up as scheduled with " cardiology  Return to clinic at next scheduled appointment, 2/18 with Dr. Aragon    No restrictions of activity.      Thank you for allowing me to participate in the care of your patient.    Regards,  ANTONIA SMITH

## 2019-02-12 ENCOUNTER — HOSPITAL ENCOUNTER (OUTPATIENT)
Dept: RESPIRATORY THERAPY | Facility: HOSPITAL | Age: 69
Discharge: HOME | End: 2019-02-12
Attending: NURSE PRACTITIONER | Admitting: NURSE PRACTITIONER

## 2019-02-18 ENCOUNTER — OFFICE VISIT (OUTPATIENT)
Dept: CARDIAC SURGERY | Facility: CLINIC | Age: 69
End: 2019-02-18

## 2019-02-18 VITALS
TEMPERATURE: 97.4 F | RESPIRATION RATE: 20 BRPM | OXYGEN SATURATION: 97 % | DIASTOLIC BLOOD PRESSURE: 86 MMHG | HEIGHT: 72 IN | HEART RATE: 90 BPM | SYSTOLIC BLOOD PRESSURE: 136 MMHG | WEIGHT: 206.6 LBS | BODY MASS INDEX: 27.98 KG/M2

## 2019-02-18 DIAGNOSIS — Z95.1 S/P CABG (CORONARY ARTERY BYPASS GRAFT): Primary | ICD-10-CM

## 2019-02-18 PROCEDURE — 99024 POSTOP FOLLOW-UP VISIT: CPT | Performed by: THORACIC SURGERY (CARDIOTHORACIC VASCULAR SURGERY)

## 2019-02-20 ENCOUNTER — HOSPITAL ENCOUNTER (OUTPATIENT)
Dept: CARDIAC REHAB | Facility: HOSPITAL | Age: 69
Setting detail: RECURRING SERIES
Discharge: HOME | End: 2019-05-02
Attending: INTERNAL MEDICINE | Admitting: INTERNAL MEDICINE

## 2019-02-20 NOTE — PROGRESS NOTES
Arik August is a 68 y.o. male s/p CABG. He reported possible worrisome drainage from the inferior aspect of his incision on his first postoperative visit.  He was placed on a short course of antibiotics and asked to re-present for another evaluation.  He reports that his incision is now completely healed.  He denies any fevers or chills.  He denies any worrisome drainage from his incision and denies any sternal pain or tenderness.    Sternum is stable, incision is clean, dry, and intact.  The sternum is also nontender to palpation.  CTA B/L.   Lower extremity incisions are clean, dry and intact.  GCS 15, no neurologic deficit.    He appears to be doing well.  I urged him to continue sternal precautions until April 2019.      Follow up with us will be on a PRN basis.

## 2019-05-14 ENCOUNTER — HOSPITAL ENCOUNTER (OUTPATIENT)
Dept: FAMILY MEDICINE CLINIC | Facility: CLINIC | Age: 69
Setting detail: SPECIMEN
Discharge: HOME | End: 2019-05-14
Attending: INTERNAL MEDICINE | Admitting: INTERNAL MEDICINE

## 2019-05-14 LAB
ALBUMIN SERPL-MCNC: 3.9 G/DL (ref 3.5–4.8)
ALBUMIN/GLOB SERPL: 1.2 {RATIO} (ref 1–1.7)
ALP SERPL-CCNC: 74 IU/L (ref 32–91)
ALT SERPL-CCNC: 30 IU/L (ref 17–63)
ANION GAP SERPL CALC-SCNC: 15.2 MMOL/L (ref 10–20)
AST SERPL-CCNC: 31 IU/L (ref 15–41)
BILIRUB SERPL-MCNC: 0.4 MG/DL (ref 0.3–1.2)
BNP SERPL-MCNC: 71 PG/ML
BUN SERPL-MCNC: 17 MG/DL (ref 8–20)
BUN/CREAT SERPL: 18.9 (ref 6.2–20.3)
CALCIUM SERPL-MCNC: 9.4 MG/DL (ref 8.9–10.3)
CHLORIDE SERPL-SCNC: 101 MMOL/L (ref 101–111)
CHOLEST SERPL-MCNC: 175 MG/DL
CHOLEST/HDLC SERPL: 3.9 {RATIO}
CONV ABS IMM GRAN: 0.08 10*3/UL
CONV ANISOCYTES: SLIGHT
CONV CO2: 24 MMOL/L (ref 22–32)
CONV LDL CHOLESTEROL DIRECT: 113 MG/DL (ref 0–100)
CONV POLYCHROMASIA IN BLOOD BY LIGHT MICROSCOPY: SLIGHT
CONV SMEAR REVIEW: (no result)
CONV TOTAL PROTEIN: 7.2 G/DL (ref 6.1–7.9)
CREAT UR-MCNC: 0.9 MG/DL (ref 0.7–1.2)
DIFFERENTIAL METHOD BLD: (no result)
EOSINOPHIL # BLD AUTO: 0.4 10*3/UL (ref 0–0.3)
EOSINOPHIL # BLD AUTO: 5 % (ref 0–3)
ERYTHROCYTE [DISTWIDTH] IN BLOOD BY AUTOMATED COUNT: 24.1 % (ref 11.5–14.5)
FERRITIN SERPL-MCNC: 25 NG/ML (ref 24–336)
FOLATE SERPL-MCNC: >23.8 NG/ML (ref 5.9–24.8)
GLOBULIN UR ELPH-MCNC: 3.3 G/DL (ref 2.5–3.8)
GLUCOSE SERPL-MCNC: 93 MG/DL (ref 65–99)
HCT VFR BLD AUTO: 39.5 % (ref 40–54)
HDLC SERPL-MCNC: 45 MG/DL
HGB BLD-MCNC: 13 G/DL (ref 14–18)
IRON SATN MFR SERPL: 9 % (ref 20–50)
IRON SERPL-MCNC: 33 UG/DL (ref 45–182)
LDLC/HDLC SERPL: 2.5 {RATIO}
LIPID INTERPRETATION: ABNORMAL
LYMPHOCYTES # BLD AUTO: 1.5 10*3/UL (ref 0.8–4.8)
LYMPHOCYTES NFR BLD AUTO: 20 % (ref 18–42)
MAGNESIUM SERPL-MCNC: 1.8 MG/DL (ref 1.8–2.5)
MCH RBC QN AUTO: 26.6 PG (ref 26–32)
MCHC RBC AUTO-ENTMCNC: 32.9 G/DL (ref 32–36)
MCV RBC AUTO: 81 FL (ref 80–94)
METAMYELOCYTES NFR BLD: 1 %
MONOCYTES # BLD AUTO: 0.3 10*3/UL (ref 0.1–1.3)
MONOCYTES NFR BLD AUTO: 4 % (ref 2–11)
NEUTROPHILS # BLD AUTO: 5.3 10*3/UL (ref 2.3–8.6)
NEUTROPHILS NFR BLD AUTO: 70 % (ref 50–75)
PLATELET # BLD AUTO: 312 10*3/UL (ref 150–450)
PMV BLD AUTO: 7.8 FL (ref 7.4–10.4)
POTASSIUM SERPL-SCNC: 4.2 MMOL/L (ref 3.6–5.1)
PSA SERPL-MCNC: 0.26 NG/ML (ref 0–4)
RBC # BLD AUTO: 4.87 10*6/UL (ref 4.6–6)
SODIUM SERPL-SCNC: 136 MMOL/L (ref 136–144)
T4 FREE SERPL-MCNC: 0.9 NG/DL (ref 0.58–1.64)
TIBC SERPL-MCNC: 379 UG/DL (ref 228–428)
TRIGL SERPL-MCNC: 251 MG/DL
TSH SERPL-ACNC: 0.54 UIU/ML (ref 0.34–5.6)
VIT B12 SERPL-MCNC: 287 PG/ML (ref 180–914)
VLDLC SERPL CALC-MCNC: 17.1 MG/DL
WBC # BLD AUTO: 7.6 10*3/UL (ref 4.5–11.5)

## 2019-05-15 LAB — HBA1C MFR BLD: 6.4 % (ref 0–5.6)

## 2019-05-19 LAB
TESTOST FREE SERPL-MCNC: 7.9 PG/ML (ref 35–155)
TESTOST SERPL-MCNC: 54 NG/DL (ref 250–1100)

## 2019-05-30 ENCOUNTER — HOSPITAL ENCOUNTER (OUTPATIENT)
Dept: FAMILY MEDICINE CLINIC | Facility: CLINIC | Age: 69
Setting detail: SPECIMEN
Discharge: HOME | End: 2019-05-30
Attending: INTERNAL MEDICINE | Admitting: INTERNAL MEDICINE

## 2019-05-30 LAB
ANION GAP SERPL CALC-SCNC: 17.9 MMOL/L (ref 10–20)
BNP SERPL-MCNC: 68 PG/ML
BUN SERPL-MCNC: 22 MG/DL (ref 8–20)
BUN/CREAT SERPL: 24.4 (ref 6.2–20.3)
CALCIUM SERPL-MCNC: 9.2 MG/DL (ref 8.9–10.3)
CHLORIDE SERPL-SCNC: 99 MMOL/L (ref 101–111)
CONV CO2: 25 MMOL/L (ref 22–32)
CREAT UR-MCNC: 0.9 MG/DL (ref 0.7–1.2)
GLUCOSE SERPL-MCNC: 178 MG/DL (ref 65–99)
MAGNESIUM SERPL-MCNC: 2 MG/DL (ref 1.8–2.5)
POTASSIUM SERPL-SCNC: 3.9 MMOL/L (ref 3.6–5.1)
SODIUM SERPL-SCNC: 138 MMOL/L (ref 136–144)

## 2019-06-11 ENCOUNTER — CONVERSION ENCOUNTER (OUTPATIENT)
Dept: FAMILY MEDICINE CLINIC | Facility: CLINIC | Age: 69
End: 2019-06-11

## 2019-06-24 DIAGNOSIS — I25.119 CORONARY ARTERY DISEASE WITH ANGINA PECTORIS, UNSPECIFIED VESSEL OR LESION TYPE, UNSPECIFIED WHETHER NATIVE OR TRANSPLANTED HEART (HCC): Primary | ICD-10-CM

## 2019-06-24 DIAGNOSIS — E34.9 TESTOSTERONE DEFICIENCY: ICD-10-CM

## 2019-06-24 RX ORDER — TESTOSTERONE CYPIONATE 200 MG/ML
200 INJECTION, SOLUTION INTRAMUSCULAR
Status: CANCELLED | OUTPATIENT
Start: 2019-06-25

## 2019-06-25 DIAGNOSIS — E34.9 TESTOSTERONE DEFICIENCY: Primary | ICD-10-CM

## 2019-06-25 DIAGNOSIS — E83.42 HYPOMAGNESEMIA: Primary | ICD-10-CM

## 2019-06-25 DIAGNOSIS — E87.6 HYPOKALEMIA: ICD-10-CM

## 2019-06-25 LAB
ANION GAP SERPL CALCULATED.3IONS-SCNC: 11 MMOL/L (ref 10–20)
BUN BLD-MCNC: 19 MG/DL (ref 8–20)
BUN/CREAT SERPL: 14.6 (ref 6.2–20.3)
CALCIUM SPEC-SCNC: 8.9 MG/DL (ref 8.9–10.3)
CHLORIDE SERPL-SCNC: 100 MMOL/L (ref 101–111)
CO2 SERPL-SCNC: 26 MMOL/L (ref 22–32)
CREAT BLD-MCNC: 1.3 MG/DL (ref 0.7–1.2)
GFR SERPL CREATININE-BSD FRML MDRD: 55 ML/MIN/1.73
GLUCOSE BLD-MCNC: 152 MG/DL (ref 65–99)
MAGNESIUM SERPL-MCNC: 2.1 MG/DL (ref 1.8–2.5)
POTASSIUM BLD-SCNC: 4.1 MMOL/L (ref 3.6–5.1)
SODIUM BLD-SCNC: 137 MMOL/L (ref 136–144)

## 2019-06-25 PROCEDURE — 80048 BASIC METABOLIC PNL TOTAL CA: CPT | Performed by: INTERNAL MEDICINE

## 2019-06-25 PROCEDURE — 83735 ASSAY OF MAGNESIUM: CPT | Performed by: INTERNAL MEDICINE

## 2019-06-25 PROCEDURE — 96372 THER/PROPH/DIAG INJ SC/IM: CPT | Performed by: INTERNAL MEDICINE

## 2019-06-25 PROCEDURE — 36415 COLL VENOUS BLD VENIPUNCTURE: CPT | Performed by: INTERNAL MEDICINE

## 2019-06-25 RX ADMIN — TESTOSTERONE CYPIONATE 200 MG: 200 INJECTION, SOLUTION INTRAMUSCULAR at 15:03

## 2019-06-26 VITALS
WEIGHT: 209 LBS | HEART RATE: 85 BPM | RESPIRATION RATE: 20 BRPM | SYSTOLIC BLOOD PRESSURE: 156 MMHG | OXYGEN SATURATION: 98 % | HEIGHT: 72 IN | DIASTOLIC BLOOD PRESSURE: 84 MMHG | BODY MASS INDEX: 28.31 KG/M2

## 2019-06-27 NOTE — PROGRESS NOTES
Vital Signs:    Patient Profile:    68 Years Old Male  Height:     72 inches  Weight:     209.0 pounds  BMI:        28.34     O2 Sat:     98 %  Temp:       98.4 degrees F oral  Pulse rate: 85 / minute  Resp:       20 per minute  BP Sittin / 84  (left arm)    Cuff size:  regular      Problems: Active problems were reviewed with the patient during this visit.  Medications: Medications were reviewed with the patient during this visit.  Allergies: Allergies were reviewed with the patient during this visit.  No Known Allergy.        Vitals Entered By: Xena Grider CMA (2019 3:24 PM)      Referring Provider:  Abbi Rosales MD  Primary Provider:  Abbi Rosales    CC:  Hot flashes.    History of Present Illness:  pt continues to have trouble with hot flashes  seems to be worsening in frequency and intensity    did try to stop diuretics  but then had trouble with LE swelling  would like to try going back to torsemide  bc didnt have to take potassium with it in the past      Past Medical History:     Reviewed history from 2018 and no changes required:        Hypertension        thyroid disease hypothyroid        arthritis        degenerative disc disease        hypercholesterolemia        Osteoarthritis        Sleep disorder        Neuropathy        CHF         orthopnea        PND        panic attacks    Past Surgical History:     Reviewed history from 01/10/2019 and no changes required:        back/neck 2013        Rt. knee replacement        Lt. knee replacement        3 right, 1 left surgeries        right ankle surgery        rt and left hand        rt and left elbow        right forearm        nose surgery        ear surgery        left shoulder rotator cuff repair and arthroscopy        lasik surgery x 4        5 V CABG 2018    Family History Summary:      Reviewed history Last on 2019 and no changes required:2019  Father - Has FH Heart Disease - Entered On: 2017    General  Comments - FH:  Family History of Arthritis  FH Heart Disease      Social History:     Reviewed history from 12/18/2018 and no changes required:        Passive Smoke: N        Alcohol Use: Y        Drug Use: N        HIV/High Risk: N        Regular Exercise: Y        Hx Domestic Abuse: N        Hoahaoism Affecting Care: N                Smoking History:        Patient is a former smoker.        Risk Factors:     Smoked Tobacco Use:  Former smoker     Cigarettes:  Yes        Year quit:  1992        Years Since Last Quit:  27  Smokeless Tobacco Use:  Never  Passive smoke exposure:  no  Drug use:  no  HIV high-risk behavior:  no  Caffeine use:  2 drinks per day  Alcohol use:  yes     Type:  rarely-2 beers a month  Exercise:  yes  Seatbelt use:  100 %  Sun Exposure:  occasionally    Previous Tobacco Use: Signed On - 05/14/2019  Smoked Tobacco Use:  Former smoker     Cigarettes:  Yes        Year quit:  1992        Years Since Last Quit:  27 years, 5 months, 25 days  Smokeless Tobacco Use:  Never  Passive smoke exposure:  no  Drug use:  no  HIV high-risk behavior:  no  Caffeine use:  2 drinks per day    Previous Alcohol Use: Signed On - 05/14/2019  Alcohol use:  yes     Type:  rarely-2 beers a month  Exercise:  yes  Seatbelt use:  100 %  Sun Exposure:  occasionally    Colonoscopy History:     Date of Last Colonoscopy:  08/20/2002        Physical Exam   Height:  72  Weight:  204.6  BP:  156/84 mm HG    Medication List:  TORSEMIDE 20 MG ORAL TABLET (TORSEMIDE) one tab daily  FERROUS SULFATE 325 (65 FE) MG ORAL TABLET (FERROUS SULFATE) take one tablet daily  MAGOX 400 400 (241.3 MG) MG ORAL TABLET (MAGNESIUM OXIDE) take one tab daily  BACLOFEN 10 MG ORAL TABLET (BACLOFEN) take one pill QID  PERCOCET  MG ORAL TABLET (OXYCODONE-ACETAMINOPHEN) Take one tablet 4 times daily prn  PATADAY 0.2 % OPHTHALMIC SOLUTION (OLOPATADINE HCL) 1 drop daily  METOPROLOL TARTRATE 25 MG ORAL TABLET (METOPROLOL TARTRATE) Take 1/2 tab by mouth  twice daily.  HYDROCODONE-ACETAMINOPHEN 5-325 MG ORAL TABLET (HYDROCODONE-ACETAMINOPHEN) as needed prn  * MELATONIN 10mg as needed  ASPIRIN 325 MG ORAL TABLET (ASPIRIN) Take one (1) tablet by mouth daily.  PROAIR  (90 BASE) MCG/ACT INHALATION AEROSOL SOLUTION (ALBUTEROL SULFATE) as needed  METHOTREXATE SODIUM 2.5 MG ORAL TABLET (METHOTREXATE SODIUM) 6 tablets a week  DICLOFENAC SODIUM 75 MG ORAL TABLET DELAYED RELEASE (DICLOFENAC SODIUM) Take one (1) tablet by mouth twice daily.  RESTASIS 0.05 % OPHTHALMIC EMULSION (CYCLOSPORINE) 1 drop into both eyes q 12 hours  DAILY MULTIVITAMIN ORAL CAPSULE (MULTIPLE VITAMINS-MINERALS) take 1 tab PO daily  FOLIC ACID TABLET (FOLIC ACID TABS) Take 1 tab po daily  COLCHICINE 0.6 MG ORAL TABLET (COLCHICINE) 1 tab PO daily  SYNTHROID 100 MCG ORAL TABLET (LEVOTHYROXINE SODIUM) TAKE ONE TABLET BY MOUTH DAILY  PROTONIX 40 MG ORAL TABLET DELAYED RELEASE (PANTOPRAZOLE SODIUM) take one tablet daily  ROSUVASTATIN CALCIUM 10 MG TAB (ROSUVASTATIN CALCIUM) TAKE ONE TABLET BY MOUTH DAILY      Surgical History   back/neck 6/4/2013  Rt. knee replacement  Lt. knee replacement  3 right, 1 left surgeries  right ankle surgery  rt and left hand  rt and left elbow  right forearm  nose surgery  ear surgery  left shoulder rotator cuff repair and arthroscopy  lasik surgery x 4  5 V CABG 12/2018  ,    Risk Factors  Tobacco Use: Former smoker  Passive smoke exposure: no  Exercise: yes  Illicit Drug use: no      Orientation     Language   Alert 1  Total MMSE Score: 30    Physical Examination   General Appearance   In no acute distress.  Alert & oriented.  Behavior and affect appropriate to situation  HEENT   PERRLA, EOMI, TM's normal.  Pharynx clear  Cardiovascular   Regular rate and rhythm  Lungs   Clear to auscultation  Musculoskeletal   no c/c/e        Impression & Recommendations:    Problem # 1:  HYPERTENSION (ICD-401.9) (SVV89-Z66)  Assessment: Unchanged  repeat manual bp better  has been doing  well at home  cont to monitor  The following medications were removed from the medication list:     Bumex 1 Mg Oral Tablet (Bumetanide) ..... Take one (1) tablet by mouth daily    His updated medication list for this problem includes:     Torsemide 20 Mg Oral Tablet (Torsemide) ..... One tab daily     Metoprolol Tartrate 25 Mg Oral Tablet (Metoprolol tartrate) ..... Take 1/2 tab by mouth twice daily.      Problem # 2:  Hot flashes in male (ICD-782.62) (BPV05-N71.2)  Assessment: Deteriorated  suspect this is 2/2 low testosterone  will try testosterone injections  and see if it helps  do not recommend testosterone injections long term, however  because of increased risk of heart disease    Problem # 3:  CHF (ICD-428.0) (KPN99-G72.9)  Assessment: Unchanged  will try torsemide instead  recheck bmp to make sure potassium is ok  The following medications were removed from the medication list:     Bumex 1 Mg Oral Tablet (Bumetanide) ..... Take one (1) tablet by mouth daily    His updated medication list for this problem includes:     Torsemide 20 Mg Oral Tablet (Torsemide) ..... One tab daily     Metoprolol Tartrate 25 Mg Oral Tablet (Metoprolol tartrate) ..... Take 1/2 tab by mouth twice daily.     Aspirin 325 Mg Oral Tablet (Aspirin) ..... Take one (1) tablet by mouth daily.      Medications Added to Medication List This Visit:  1)  Torsemide 20 Mg Oral Tablet (Torsemide) .... One tab daily    Other Orders:  Administration of Injections  (52299)  Depo Testosterone per 1mg ()      Patient Instructions:  1)  During this office visit we discussed the pertinent aspects of the visit and the treatment recommendations.  Patient was given the opportunity to ask questions and discuss other concerns.                Medication Administration    Injection # 1:     Medication: testosterone 200mg     Route: IM     Site: [facility] gluts     Exp Date: 05/2020     Lot #: [id number]     Mfr: [email or web address]     Given by: Xena Grider [facility]     Date  Given:06/11/2019 3:59 PM     Tolerated w/o complications    Orders Added:  1)  Administration of Injections  [71709]  2)  Depo Testosterone per 1mg []  3)  Ofc Vst, Est Level III [13285]        Electronically signed by Abbi Rosales on 06/26/2019 at 7:13 PM  ________________________________________________________________________       Disclaimer: Converted Note message may not contain all data elements that existed in the legacy source system. Please see [employer] for the original note details.

## 2019-07-03 RX ORDER — TESTOSTERONE CYPIONATE 200 MG/ML
200 INJECTION, SOLUTION INTRAMUSCULAR ONCE
Status: CANCELLED | OUTPATIENT
Start: 2019-07-03 | End: 2019-07-03

## 2019-07-05 RX ORDER — TESTOSTERONE CYPIONATE 200 MG/ML
200 INJECTION, SOLUTION INTRAMUSCULAR ONCE
Status: COMPLETED | OUTPATIENT
Start: 2019-07-05 | End: 2019-06-25

## 2019-07-08 RX ORDER — ROSUVASTATIN CALCIUM 10 MG/1
TABLET, COATED ORAL
Qty: 90 TABLET | Refills: 0 | Status: SHIPPED | OUTPATIENT
Start: 2019-07-08 | End: 2019-10-11 | Stop reason: SDUPTHER

## 2019-07-11 DIAGNOSIS — E34.9 TESTOSTERONE DEFICIENCY: Primary | ICD-10-CM

## 2019-07-11 PROCEDURE — 96372 THER/PROPH/DIAG INJ SC/IM: CPT | Performed by: INTERNAL MEDICINE

## 2019-07-11 RX ADMIN — TESTOSTERONE CYPIONATE 200 MG: 200 INJECTION, SOLUTION INTRAMUSCULAR at 15:38

## 2019-07-16 RX ORDER — TESTOSTERONE CYPIONATE 200 MG/ML
200 INJECTION, SOLUTION INTRAMUSCULAR
Status: DISCONTINUED | OUTPATIENT
Start: 2019-07-16 | End: 2023-01-03

## 2019-07-25 RX ORDER — LEVOTHYROXINE SODIUM 0.1 MG/1
TABLET ORAL
Qty: 90 TABLET | Refills: 0 | Status: SHIPPED | OUTPATIENT
Start: 2019-07-25 | End: 2019-10-18 | Stop reason: SDUPTHER

## 2019-07-30 ENCOUNTER — LAB REQUISITION (OUTPATIENT)
Dept: LAB | Facility: HOSPITAL | Age: 69
End: 2019-07-30

## 2019-07-30 ENCOUNTER — ON CAMPUS - OUTPATIENT (AMBULATORY)
Dept: URBAN - METROPOLITAN AREA HOSPITAL 2 | Facility: HOSPITAL | Age: 69
End: 2019-07-30
Payer: COMMERCIAL

## 2019-07-30 ENCOUNTER — OFFICE (AMBULATORY)
Dept: URBAN - METROPOLITAN AREA PATHOLOGY 4 | Facility: PATHOLOGY | Age: 69
End: 2019-07-30
Payer: COMMERCIAL

## 2019-07-30 VITALS
RESPIRATION RATE: 14 BRPM | SYSTOLIC BLOOD PRESSURE: 99 MMHG | SYSTOLIC BLOOD PRESSURE: 160 MMHG | HEART RATE: 78 BPM | SYSTOLIC BLOOD PRESSURE: 106 MMHG | DIASTOLIC BLOOD PRESSURE: 70 MMHG | OXYGEN SATURATION: 99 % | SYSTOLIC BLOOD PRESSURE: 114 MMHG | DIASTOLIC BLOOD PRESSURE: 100 MMHG | TEMPERATURE: 97.7 F | DIASTOLIC BLOOD PRESSURE: 67 MMHG | SYSTOLIC BLOOD PRESSURE: 95 MMHG | SYSTOLIC BLOOD PRESSURE: 109 MMHG | OXYGEN SATURATION: 96 % | DIASTOLIC BLOOD PRESSURE: 92 MMHG | DIASTOLIC BLOOD PRESSURE: 61 MMHG | OXYGEN SATURATION: 98 % | HEART RATE: 103 BPM | HEART RATE: 89 BPM | OXYGEN SATURATION: 94 % | WEIGHT: 212.8 LBS | HEIGHT: 72 IN | HEART RATE: 80 BPM | RESPIRATION RATE: 12 BRPM | HEART RATE: 84 BPM | OXYGEN SATURATION: 93 % | HEART RATE: 82 BPM | DIASTOLIC BLOOD PRESSURE: 74 MMHG | RESPIRATION RATE: 20 BRPM | RESPIRATION RATE: 16 BRPM | DIASTOLIC BLOOD PRESSURE: 58 MMHG | OXYGEN SATURATION: 92 % | DIASTOLIC BLOOD PRESSURE: 66 MMHG | SYSTOLIC BLOOD PRESSURE: 145 MMHG

## 2019-07-30 DIAGNOSIS — K57.30 DIVERTICULOSIS OF LARGE INTESTINE WITHOUT PERFORATION OR ABS: ICD-10-CM

## 2019-07-30 DIAGNOSIS — K64.1 SECOND DEGREE HEMORRHOIDS: ICD-10-CM

## 2019-07-30 DIAGNOSIS — K63.3 ULCER OF INTESTINE: ICD-10-CM

## 2019-07-30 DIAGNOSIS — Z12.11 ENCOUNTER FOR SCREENING FOR MALIGNANT NEOPLASM OF COLON: ICD-10-CM

## 2019-07-30 DIAGNOSIS — K52.9 NONINFECTIVE GASTROENTERITIS AND COLITIS, UNSPECIFIED: ICD-10-CM

## 2019-07-30 DIAGNOSIS — D12.5 BENIGN NEOPLASM OF SIGMOID COLON: ICD-10-CM

## 2019-07-30 DIAGNOSIS — K57.30 DIVERTICULOSIS OF LARGE INTESTINE WITHOUT PERFORATION OR ABSCESS WITHOUT BLEEDING: ICD-10-CM

## 2019-07-30 LAB
GI HISTOLOGY: A. SELECT: (no result)
GI HISTOLOGY: PDF REPORT: (no result)

## 2019-07-30 PROCEDURE — 45380 COLONOSCOPY AND BIOPSY: CPT | Mod: PT | Performed by: INTERNAL MEDICINE

## 2019-07-30 PROCEDURE — 88305 TISSUE EXAM BY PATHOLOGIST: CPT | Performed by: INTERNAL MEDICINE

## 2019-07-30 PROCEDURE — 88305 TISSUE EXAM BY PATHOLOGIST: CPT | Mod: 26 | Performed by: INTERNAL MEDICINE

## 2019-07-31 LAB
LAB AP CASE REPORT: NORMAL
LAB AP DIAGNOSIS COMMENT: NORMAL
PATH REPORT.FINAL DX SPEC: NORMAL
PATH REPORT.GROSS SPEC: NORMAL

## 2019-08-06 ENCOUNTER — CLINICAL SUPPORT (OUTPATIENT)
Dept: FAMILY MEDICINE CLINIC | Facility: CLINIC | Age: 69
End: 2019-08-06

## 2019-08-06 DIAGNOSIS — E34.9 TESTOSTERONE DEFICIENCY: ICD-10-CM

## 2019-08-06 PROCEDURE — 96372 THER/PROPH/DIAG INJ SC/IM: CPT | Performed by: INTERNAL MEDICINE

## 2019-08-06 RX ADMIN — TESTOSTERONE CYPIONATE 200 MG: 200 INJECTION, SOLUTION INTRAMUSCULAR at 10:12

## 2019-08-12 ENCOUNTER — OFFICE VISIT (OUTPATIENT)
Dept: FAMILY MEDICINE CLINIC | Facility: CLINIC | Age: 69
End: 2019-08-12

## 2019-08-12 VITALS
HEIGHT: 72 IN | OXYGEN SATURATION: 95 % | HEART RATE: 78 BPM | DIASTOLIC BLOOD PRESSURE: 84 MMHG | TEMPERATURE: 99.1 F | WEIGHT: 219.6 LBS | BODY MASS INDEX: 29.74 KG/M2 | SYSTOLIC BLOOD PRESSURE: 152 MMHG | RESPIRATION RATE: 12 BRPM

## 2019-08-12 DIAGNOSIS — I25.10 CORONARY ARTERY DISEASE WITHOUT ANGINA PECTORIS, UNSPECIFIED VESSEL OR LESION TYPE, UNSPECIFIED WHETHER NATIVE OR TRANSPLANTED HEART: ICD-10-CM

## 2019-08-12 DIAGNOSIS — M51.36 DEGENERATION OF LUMBAR INTERVERTEBRAL DISC: ICD-10-CM

## 2019-08-12 DIAGNOSIS — E29.1 DEFICIENCY OF TESTOSTERONE BIOSYNTHESIS: ICD-10-CM

## 2019-08-12 DIAGNOSIS — I50.23 ACUTE ON CHRONIC SYSTOLIC HEART FAILURE (HCC): Primary | ICD-10-CM

## 2019-08-12 DIAGNOSIS — I10 ESSENTIAL HYPERTENSION: ICD-10-CM

## 2019-08-12 PROBLEM — E78.5 HYPERLIPIDEMIA: Status: ACTIVE | Noted: 2019-08-12

## 2019-08-12 PROBLEM — M51.369 DEGENERATION OF LUMBAR INTERVERTEBRAL DISC: Status: ACTIVE | Noted: 2018-08-27

## 2019-08-12 PROBLEM — D64.9 ANEMIA: Status: ACTIVE | Noted: 2019-05-14

## 2019-08-12 PROBLEM — R26.89 BALANCE PROBLEM: Status: ACTIVE | Noted: 2018-08-22

## 2019-08-12 PROCEDURE — 99214 OFFICE O/P EST MOD 30 MIN: CPT | Performed by: INTERNAL MEDICINE

## 2019-08-12 RX ORDER — POTASSIUM CHLORIDE 20 MEQ/1
20 TABLET, EXTENDED RELEASE ORAL 2 TIMES DAILY
COMMUNITY
Start: 2019-07-08 | End: 2020-01-02

## 2019-08-12 RX ORDER — MV-MIN/FOLIC/VIT K/LYCOP/COQ10 200-100MCG
CAPSULE ORAL
COMMUNITY
Start: 2018-10-05

## 2019-08-12 RX ORDER — OXYCODONE AND ACETAMINOPHEN 10; 325 MG/1; MG/1
1 TABLET ORAL EVERY 6 HOURS PRN
Qty: 120 TABLET | Refills: 0 | Status: SHIPPED | OUTPATIENT
Start: 2019-08-12 | End: 2019-11-11 | Stop reason: SDUPTHER

## 2019-08-12 RX ORDER — CHLORHEXIDINE GLUCONATE 0.12 MG/ML
RINSE ORAL
COMMUNITY
Start: 2019-08-09 | End: 2019-11-22

## 2019-08-12 RX ORDER — FERROUS SULFATE 325(65) MG
TABLET ORAL EVERY 24 HOURS
COMMUNITY
Start: 2019-05-16 | End: 2019-10-26 | Stop reason: SDUPTHER

## 2019-08-12 RX ORDER — OLOPATADINE HYDROCHLORIDE 2 MG/ML
1 SOLUTION/ DROPS OPHTHALMIC DAILY
Qty: 1 BOTTLE | Refills: 3 | Status: SHIPPED | OUTPATIENT
Start: 2019-08-12 | End: 2020-09-10

## 2019-08-12 RX ORDER — DICLOFENAC SODIUM 75 MG/1
TABLET, DELAYED RELEASE ORAL
COMMUNITY
Start: 2018-12-18 | End: 2019-09-12 | Stop reason: SDUPTHER

## 2019-08-12 RX ORDER — COLCHICINE 0.6 MG/1
TABLET ORAL
COMMUNITY
Start: 2019-06-20 | End: 2020-09-10

## 2019-08-12 RX ORDER — BUMETANIDE 2 MG/1
2 TABLET ORAL DAILY
Qty: 90 TABLET | Refills: 1 | Status: SHIPPED | OUTPATIENT
Start: 2019-08-12 | End: 2020-02-26

## 2019-08-12 RX ORDER — CYCLOSPORINE 0.5 MG/ML
EMULSION OPHTHALMIC
COMMUNITY
Start: 2018-10-05

## 2019-08-12 RX ORDER — BACLOFEN 10 MG/1
10 TABLET ORAL AS NEEDED
COMMUNITY
Start: 2019-07-11 | End: 2020-12-01 | Stop reason: SDUPTHER

## 2019-08-12 RX ORDER — FOLIC ACID 1 MG/1
TABLET ORAL
COMMUNITY

## 2019-08-12 RX ORDER — OXYCODONE AND ACETAMINOPHEN 10; 325 MG/1; MG/1
TABLET ORAL
COMMUNITY
Start: 2019-04-16 | End: 2019-08-12 | Stop reason: SDUPTHER

## 2019-08-12 RX ORDER — PANTOPRAZOLE SODIUM 40 MG/1
TABLET, DELAYED RELEASE ORAL
COMMUNITY
Start: 2012-05-31 | End: 2019-09-17

## 2019-08-12 NOTE — PROGRESS NOTES
Subjective   Arik August is a 69 y.o. male.     Here for med check htn, heart failure, hot flashes, low testosterone  Hot flashes and fatigue did resolve with restarting testosterone injections  Has a lot more energy and motivation now, doesn't just want to sit on the couch    Did not work to try to switch back to torsemide  Developed LE edema  So went back to bumex + potassium  Had wanted to switch to torsemide only bc didn't need kcl then  And was trying to decrease pill burden    Also, still having trouble with back pain and balance  Stopped lyrica, though it was helping  And tries not to take percocet  But often then has to take more the next day         The following portions of the patient's history were reviewed and updated as appropriate: allergies, current medications, past family history, past medical history, past social history, past surgical history and problem list.    Review of Systems   Constitutional: Negative for fatigue and fever.   HENT: Negative for congestion, ear pain, rhinorrhea and sore throat.    Eyes: Negative for blurred vision and itching.   Respiratory: Negative for cough and shortness of breath.    Cardiovascular: Positive for leg swelling. Negative for chest pain and palpitations.   Gastrointestinal: Negative for abdominal pain, diarrhea and vomiting.   Endocrine: Negative for polydipsia and polyuria.   Genitourinary: Negative for dysuria, frequency, hematuria and urgency.   Musculoskeletal: Positive for back pain and gait problem. Negative for joint swelling and myalgias.   Skin: Negative for rash and skin lesions.   Neurological: Negative for dizziness, numbness and headache.   Psychiatric/Behavioral: Negative for depressed mood. The patient is not nervous/anxious.          Current Outpatient Medications:   •  aspirin  MG tablet, Take 325 mg by mouth Every 6 (Six) Hours As Needed., Disp: , Rfl:   •  baclofen (LIORESAL) 10 MG tablet, , Disp: , Rfl:   •  chlorhexidine  (PERIDEX) 0.12 % solution, , Disp: , Rfl:   •  colchicine 0.6 MG tablet, , Disp: , Rfl:   •  cycloSPORINE (RESTASIS) 0.05 % ophthalmic emulsion, RESTASIS 0.05 % EMUL, Disp: , Rfl:   •  diclofenac (VOLTAREN) 75 MG EC tablet, diclofenac sodium 75 mg tablet,delayed release, Disp: , Rfl:   •  ferrous sulfate 325 (65 FE) MG tablet, Daily., Disp: , Rfl:   •  folic acid (FOLVITE) 1 MG tablet, folic acid 1 mg tablet  Take 1 tablet every day by oral route., Disp: , Rfl:   •  levothyroxine (SYNTHROID, LEVOTHROID) 100 MCG tablet, TAKE ONE TABLET BY MOUTH DAILY, Disp: 90 tablet, Rfl: 0  •  magnesium oxide (MAGOX) 400 (241.3 Mg) MG tablet tablet, TAKE ONE TABLET BY MOUTH DAILY, Disp: 90 tablet, Rfl: 0  •  Melatonin 10 MG capsule, Take  by mouth Every Night., Disp: , Rfl:   •  methotrexate 2.5 MG tablet, , Disp: , Rfl:   •  metoprolol tartrate (LOPRESSOR) 25 MG tablet, 12.5 mg., Disp: , Rfl:   •  Multiple Vitamins-Minerals (DAILY MULTIVITAMIN) capsule, DAILY MULTIVITAMIN CAPS, Disp: , Rfl:   •  olopatadine (PATADAY) 0.2 % solution ophthalmic solution, Administer 1 drop to both eyes Daily., Disp: 1 bottle, Rfl: 3  •  oxyCODONE-acetaminophen (PERCOCET)  MG per tablet, Take 1 tablet by mouth Every 6 (Six) Hours As Needed for Moderate Pain ., Disp: 120 tablet, Rfl: 0  •  pantoprazole (PROTONIX) 40 MG EC tablet, pantoprazole 40 mg tablet,delayed release  Take 1 tablet every day by oral route., Disp: , Rfl:   •  potassium chloride (K-DUR,KLOR-CON) 20 MEQ CR tablet, , Disp: , Rfl:   •  rosuvastatin (CRESTOR) 10 MG tablet, TAKE ONE TABLET BY MOUTH DAILY, Disp: 90 tablet, Rfl: 0  •  triamcinolone (KENALOG) 0.1 % cream, by Intratympanic route., Disp: , Rfl:   •  bumetanide (BUMEX) 2 MG tablet, Take 1 tablet by mouth Daily., Disp: 90 tablet, Rfl: 1  •  FLOVENT  MCG/ACT inhaler, , Disp: , Rfl:   •  PROAIR  (90 Base) MCG/ACT inhaler, , Disp: , Rfl:     Current Facility-Administered Medications:   •  Testosterone Cypionate  "(DEPOTESTOTERONE CYPIONATE) injection 200 mg, 200 mg, Intramuscular, Q14 Days, Abbi Rosales MD, 200 mg at 08/06/19 1012    Objective   /84 (BP Location: Left arm, Patient Position: Sitting, Cuff Size: Large Adult)   Pulse 78   Temp 99.1 °F (37.3 °C) (Oral)   Resp 12   Ht 182.9 cm (72\")   Wt 99.6 kg (219 lb 9.6 oz)   SpO2 95%   BMI 29.78 kg/m²   Physical Exam   Constitutional: He is oriented to person, place, and time. He appears well-developed and well-nourished. No distress.   HENT:   Head: Normocephalic and atraumatic.   Right Ear: External ear normal.   Left Ear: External ear normal.   Mouth/Throat: Oropharynx is clear and moist. No oropharyngeal exudate.   Eyes: Conjunctivae and EOM are normal. Right eye exhibits no discharge. Left eye exhibits no discharge. No scleral icterus.   Neck: Normal range of motion. Neck supple. No thyromegaly present.   Cardiovascular: Normal rate, regular rhythm and normal heart sounds. Exam reveals no gallop and no friction rub.   No murmur heard.  Pulmonary/Chest: Effort normal and breath sounds normal. No respiratory distress. He has no wheezes. He has no rales.   Musculoskeletal: Normal range of motion. He exhibits edema. He exhibits no deformity.   Ankle edema, 1+   Lymphadenopathy:     He has no cervical adenopathy.   Neurological: He is alert and oriented to person, place, and time. No cranial nerve deficit.   Skin: Skin is warm and dry. No rash noted. He is not diaphoretic. No erythema.   Psychiatric: He has a normal mood and affect. His behavior is normal. Thought content normal.   Vitals reviewed.        Assessment/Plan   Problems Addressed this Visit        Cardiovascular and Mediastinum    CAD (coronary artery disease)    HFrEF (heart failure with reduced ejection fraction) (CMS/[facility]) - Primary    Hypertension    Relevant Medications    bumetanide (BUMEX) 2 MG tablet       Endocrine    Deficiency of testosterone biosynthesis        Agree with " restarting bumex -  Increase to 2mg qAM  Keep kcl the same - check bmp in 1-2 weeks  Cont testosterone until further discussion with dr castelan  Testosterone increases risk of heart disease, but seems to improve pt's quality of life  Couldn't sleep bc of hot flashes and much less active then  Also discussed he will likely not be able to stop taking percocet, given the condition of his back  Will likely at least need it qhs  But to try lyrica, and that may help with balance and decrease need for percocet  Considered adding aldactone bc of heart failure and to help decrease need for kcl, but concerned that will counter testosterone injections  will wait for now       Procedures

## 2019-08-20 ENCOUNTER — CLINICAL SUPPORT (OUTPATIENT)
Dept: FAMILY MEDICINE CLINIC | Facility: CLINIC | Age: 69
End: 2019-08-20

## 2019-08-20 DIAGNOSIS — E29.1 DEFICIENCY OF TESTOSTERONE BIOSYNTHESIS: ICD-10-CM

## 2019-08-20 PROCEDURE — 96372 THER/PROPH/DIAG INJ SC/IM: CPT | Performed by: INTERNAL MEDICINE

## 2019-08-20 RX ADMIN — TESTOSTERONE CYPIONATE 200 MG: 200 INJECTION, SOLUTION INTRAMUSCULAR at 09:45

## 2019-08-22 ENCOUNTER — TELEPHONE (OUTPATIENT)
Dept: FAMILY MEDICINE CLINIC | Facility: CLINIC | Age: 69
End: 2019-08-22

## 2019-08-22 NOTE — TELEPHONE ENCOUNTER
Patient called stating the samples of LYRICA 50 MG worked really well for him. Patient would like a new script sent to pharmacy.

## 2019-08-23 DIAGNOSIS — E87.6 HYPOKALEMIA: ICD-10-CM

## 2019-08-23 DIAGNOSIS — E83.42 HYPOMAGNESEMIA: Primary | ICD-10-CM

## 2019-08-23 RX ORDER — PREGABALIN 50 MG/1
50 CAPSULE ORAL 2 TIMES DAILY
Qty: 60 CAPSULE | Refills: 3 | Status: SHIPPED | OUTPATIENT
Start: 2019-08-23 | End: 2019-09-17

## 2019-08-26 ENCOUNTER — LAB (OUTPATIENT)
Dept: FAMILY MEDICINE CLINIC | Facility: CLINIC | Age: 69
End: 2019-08-26

## 2019-08-26 DIAGNOSIS — E83.42 HYPOMAGNESEMIA: ICD-10-CM

## 2019-08-26 DIAGNOSIS — E87.6 HYPOKALEMIA: ICD-10-CM

## 2019-08-26 LAB
ANION GAP SERPL CALCULATED.3IONS-SCNC: 16.3 MMOL/L (ref 5–15)
BUN BLD-MCNC: 16 MG/DL (ref 8–20)
BUN/CREAT SERPL: 17.8 (ref 6.2–20.3)
CALCIUM SPEC-SCNC: 9 MG/DL (ref 8.9–10.3)
CHLORIDE SERPL-SCNC: 102 MMOL/L (ref 101–111)
CO2 SERPL-SCNC: 24 MMOL/L (ref 22–32)
CREAT BLD-MCNC: 0.9 MG/DL (ref 0.7–1.2)
GFR SERPL CREATININE-BSD FRML MDRD: 84 ML/MIN/1.73
GLUCOSE BLD-MCNC: 100 MG/DL (ref 65–99)
MAGNESIUM SERPL-MCNC: 2.3 MG/DL (ref 1.8–2.5)
POTASSIUM BLD-SCNC: 4.3 MMOL/L (ref 3.6–5.1)
SODIUM BLD-SCNC: 138 MMOL/L (ref 136–144)

## 2019-08-26 PROCEDURE — 80048 BASIC METABOLIC PNL TOTAL CA: CPT | Performed by: INTERNAL MEDICINE

## 2019-08-26 PROCEDURE — 83735 ASSAY OF MAGNESIUM: CPT | Performed by: INTERNAL MEDICINE

## 2019-08-26 PROCEDURE — 36415 COLL VENOUS BLD VENIPUNCTURE: CPT

## 2019-08-27 DIAGNOSIS — I50.23 ACUTE ON CHRONIC SYSTOLIC HEART FAILURE (HCC): ICD-10-CM

## 2019-08-27 DIAGNOSIS — Z12.5 SCREENING FOR MALIGNANT NEOPLASM OF PROSTATE: ICD-10-CM

## 2019-08-27 DIAGNOSIS — E29.1 DEFICIENCY OF TESTOSTERONE BIOSYNTHESIS: ICD-10-CM

## 2019-08-27 DIAGNOSIS — I10 ESSENTIAL HYPERTENSION: ICD-10-CM

## 2019-08-27 DIAGNOSIS — I10 ESSENTIAL (PRIMARY) HYPERTENSION: ICD-10-CM

## 2019-08-27 DIAGNOSIS — E78.5 HYPERLIPIDEMIA, UNSPECIFIED HYPERLIPIDEMIA TYPE: Primary | ICD-10-CM

## 2019-08-27 DIAGNOSIS — E87.6 HYPOKALEMIA: ICD-10-CM

## 2019-08-27 DIAGNOSIS — E11.9 TYPE 2 DIABETES MELLITUS WITHOUT COMPLICATION, WITHOUT LONG-TERM CURRENT USE OF INSULIN (HCC): ICD-10-CM

## 2019-09-13 RX ORDER — DICLOFENAC SODIUM 75 MG/1
TABLET, DELAYED RELEASE ORAL
Qty: 180 TABLET | Refills: 2 | Status: SHIPPED | OUTPATIENT
Start: 2019-09-13 | End: 2020-06-15

## 2019-09-16 ENCOUNTER — TRANSCRIBE ORDERS (OUTPATIENT)
Dept: ADMINISTRATIVE | Facility: HOSPITAL | Age: 69
End: 2019-09-16

## 2019-09-16 DIAGNOSIS — M46.46 DISCITIS OF LUMBAR REGION: Primary | ICD-10-CM

## 2019-09-16 DIAGNOSIS — M46.40 DISCITIS, UNSPECIFIED SPINAL REGION: ICD-10-CM

## 2019-09-17 ENCOUNTER — OFFICE VISIT (OUTPATIENT)
Dept: CARDIOLOGY | Facility: CLINIC | Age: 69
End: 2019-09-17

## 2019-09-17 ENCOUNTER — HOSPITAL ENCOUNTER (OUTPATIENT)
Dept: MRI IMAGING | Facility: HOSPITAL | Age: 69
Discharge: HOME | End: 2019-09-17

## 2019-09-17 ENCOUNTER — HOSPITAL ENCOUNTER (OUTPATIENT)
Dept: MRI IMAGING | Facility: HOSPITAL | Age: 69
Discharge: HOME | End: 2019-09-17
Admitting: ORTHOPAEDIC SURGERY

## 2019-09-17 VITALS
HEIGHT: 72 IN | BODY MASS INDEX: 28.58 KG/M2 | WEIGHT: 211 LBS | DIASTOLIC BLOOD PRESSURE: 80 MMHG | HEART RATE: 92 BPM | OXYGEN SATURATION: 97 % | SYSTOLIC BLOOD PRESSURE: 142 MMHG

## 2019-09-17 DIAGNOSIS — I25.10 CORONARY ARTERY DISEASE INVOLVING NATIVE CORONARY ARTERY OF NATIVE HEART WITHOUT ANGINA PECTORIS: Primary | ICD-10-CM

## 2019-09-17 DIAGNOSIS — E78.2 MIXED HYPERLIPIDEMIA: ICD-10-CM

## 2019-09-17 DIAGNOSIS — M46.46 DISCITIS OF LUMBAR REGION: ICD-10-CM

## 2019-09-17 DIAGNOSIS — I25.5 ISCHEMIC CARDIOMYOPATHY: ICD-10-CM

## 2019-09-17 DIAGNOSIS — I10 ESSENTIAL HYPERTENSION: ICD-10-CM

## 2019-09-17 DIAGNOSIS — M46.40 DISCITIS, UNSPECIFIED SPINAL REGION: ICD-10-CM

## 2019-09-17 DIAGNOSIS — Z95.1 S/P CABG X 5: ICD-10-CM

## 2019-09-17 PROBLEM — I50.20 HFREF (HEART FAILURE WITH REDUCED EJECTION FRACTION): Status: RESOLVED | Noted: 2019-02-07 | Resolved: 2019-09-17

## 2019-09-17 PROCEDURE — 99213 OFFICE O/P EST LOW 20 MIN: CPT | Performed by: INTERNAL MEDICINE

## 2019-09-17 PROCEDURE — 72157 MRI CHEST SPINE W/O & W/DYE: CPT

## 2019-09-17 PROCEDURE — 93000 ELECTROCARDIOGRAM COMPLETE: CPT | Performed by: INTERNAL MEDICINE

## 2019-09-17 PROCEDURE — 72158 MRI LUMBAR SPINE W/O & W/DYE: CPT

## 2019-09-17 PROCEDURE — A9576 INJ PROHANCE MULTIPACK: HCPCS | Performed by: ORTHOPAEDIC SURGERY

## 2019-09-17 PROCEDURE — 25010000002 GADOTERIDOL PER 1 ML: Performed by: ORTHOPAEDIC SURGERY

## 2019-09-17 RX ADMIN — GADOTERIDOL 20 ML: 279.3 INJECTION, SOLUTION INTRAVENOUS at 13:33

## 2019-10-11 RX ORDER — ROSUVASTATIN CALCIUM 10 MG/1
TABLET, COATED ORAL
Qty: 90 TABLET | Refills: 0 | Status: SHIPPED | OUTPATIENT
Start: 2019-10-11 | End: 2020-01-09

## 2019-10-18 RX ORDER — LEVOTHYROXINE SODIUM 0.1 MG/1
TABLET ORAL
Qty: 90 TABLET | Refills: 0 | Status: SHIPPED | OUTPATIENT
Start: 2019-10-18 | End: 2020-01-17

## 2019-10-28 RX ORDER — FERROUS SULFATE 325(65) MG
TABLET ORAL
Qty: 90 TABLET | Refills: 0 | Status: SHIPPED | OUTPATIENT
Start: 2019-10-28 | End: 2020-01-24

## 2019-11-04 ENCOUNTER — LAB (OUTPATIENT)
Dept: FAMILY MEDICINE CLINIC | Facility: CLINIC | Age: 69
End: 2019-11-04

## 2019-11-04 DIAGNOSIS — Z12.5 SCREENING FOR MALIGNANT NEOPLASM OF PROSTATE: ICD-10-CM

## 2019-11-04 DIAGNOSIS — I50.23 ACUTE ON CHRONIC SYSTOLIC HEART FAILURE (HCC): ICD-10-CM

## 2019-11-04 DIAGNOSIS — E29.1 DEFICIENCY OF TESTOSTERONE BIOSYNTHESIS: ICD-10-CM

## 2019-11-04 DIAGNOSIS — E87.6 HYPOKALEMIA: ICD-10-CM

## 2019-11-04 DIAGNOSIS — I10 ESSENTIAL HYPERTENSION: ICD-10-CM

## 2019-11-04 DIAGNOSIS — E11.9 TYPE 2 DIABETES MELLITUS WITHOUT COMPLICATION, WITHOUT LONG-TERM CURRENT USE OF INSULIN (HCC): ICD-10-CM

## 2019-11-04 DIAGNOSIS — E78.5 HYPERLIPIDEMIA, UNSPECIFIED HYPERLIPIDEMIA TYPE: ICD-10-CM

## 2019-11-04 DIAGNOSIS — I10 ESSENTIAL (PRIMARY) HYPERTENSION: ICD-10-CM

## 2019-11-04 LAB
ALBUMIN SERPL-MCNC: 4.2 G/DL (ref 3.5–5.2)
ALBUMIN/GLOB SERPL: 1.4 G/DL
ALP SERPL-CCNC: 77 U/L (ref 39–117)
ALT SERPL W P-5'-P-CCNC: 42 U/L (ref 1–41)
ANION GAP SERPL CALCULATED.3IONS-SCNC: 9.2 MMOL/L (ref 5–15)
AST SERPL-CCNC: 34 U/L (ref 1–40)
BASOPHILS # BLD AUTO: 0.06 10*3/MM3 (ref 0–0.2)
BASOPHILS NFR BLD AUTO: 1 % (ref 0–1.5)
BILIRUB SERPL-MCNC: 0.4 MG/DL (ref 0.2–1.2)
BUN BLD-MCNC: 23 MG/DL (ref 8–23)
BUN/CREAT SERPL: 21.1 (ref 7–25)
CALCIUM SPEC-SCNC: 9.6 MG/DL (ref 8.6–10.5)
CHLORIDE SERPL-SCNC: 100 MMOL/L (ref 98–107)
CHOLEST SERPL-MCNC: 181 MG/DL (ref 0–200)
CO2 SERPL-SCNC: 32.8 MMOL/L (ref 22–29)
CREAT BLD-MCNC: 1.09 MG/DL (ref 0.76–1.27)
DEPRECATED RDW RBC AUTO: 51.4 FL (ref 37–54)
EOSINOPHIL # BLD AUTO: 0.32 10*3/MM3 (ref 0–0.4)
EOSINOPHIL NFR BLD AUTO: 5.3 % (ref 0.3–6.2)
ERYTHROCYTE [DISTWIDTH] IN BLOOD BY AUTOMATED COUNT: 15.9 % (ref 12.3–15.4)
GFR SERPL CREATININE-BSD FRML MDRD: 67 ML/MIN/1.73
GLOBULIN UR ELPH-MCNC: 3.1 GM/DL
GLUCOSE BLD-MCNC: 124 MG/DL (ref 65–99)
HBA1C MFR BLD: 6.9 % (ref 3.5–5.6)
HCT VFR BLD AUTO: 43.2 % (ref 37.5–51)
HDLC SERPL-MCNC: 42 MG/DL (ref 40–60)
HGB BLD-MCNC: 15.1 G/DL (ref 13–17.7)
IMM GRANULOCYTES # BLD AUTO: 0.02 10*3/MM3 (ref 0–0.05)
IMM GRANULOCYTES NFR BLD AUTO: 0.3 % (ref 0–0.5)
LDLC SERPL CALC-MCNC: 105 MG/DL (ref 0–100)
LDLC/HDLC SERPL: 2.5 {RATIO}
LYMPHOCYTES # BLD AUTO: 1.3 10*3/MM3 (ref 0.7–3.1)
LYMPHOCYTES NFR BLD AUTO: 21.7 % (ref 19.6–45.3)
MCH RBC QN AUTO: 31.6 PG (ref 26.6–33)
MCHC RBC AUTO-ENTMCNC: 35 G/DL (ref 31.5–35.7)
MCV RBC AUTO: 90.4 FL (ref 79–97)
MONOCYTES # BLD AUTO: 0.64 10*3/MM3 (ref 0.1–0.9)
MONOCYTES NFR BLD AUTO: 10.7 % (ref 5–12)
NEUTROPHILS # BLD AUTO: 3.66 10*3/MM3 (ref 1.7–7)
NEUTROPHILS NFR BLD AUTO: 61 % (ref 42.7–76)
NRBC BLD AUTO-RTO: 0 /100 WBC (ref 0–0.2)
NT-PROBNP SERPL-MCNC: 446.4 PG/ML (ref 5–900)
PLATELET # BLD AUTO: 283 10*3/MM3 (ref 140–450)
PMV BLD AUTO: 10.3 FL (ref 6–12)
POTASSIUM BLD-SCNC: 4.2 MMOL/L (ref 3.5–5.2)
PROT SERPL-MCNC: 7.3 G/DL (ref 6–8.5)
PSA SERPL-MCNC: 0.2 NG/ML (ref 0–4)
RBC # BLD AUTO: 4.78 10*6/MM3 (ref 4.14–5.8)
SODIUM BLD-SCNC: 142 MMOL/L (ref 136–145)
TRIGL SERPL-MCNC: 170 MG/DL (ref 0–150)
VLDLC SERPL-MCNC: 34 MG/DL (ref 5–40)
WBC NRBC COR # BLD: 6 10*3/MM3 (ref 3.4–10.8)

## 2019-11-04 PROCEDURE — 83880 ASSAY OF NATRIURETIC PEPTIDE: CPT | Performed by: INTERNAL MEDICINE

## 2019-11-04 PROCEDURE — 84403 ASSAY OF TOTAL TESTOSTERONE: CPT | Performed by: INTERNAL MEDICINE

## 2019-11-04 PROCEDURE — 85025 COMPLETE CBC W/AUTO DIFF WBC: CPT | Performed by: INTERNAL MEDICINE

## 2019-11-04 PROCEDURE — G0103 PSA SCREENING: HCPCS | Performed by: INTERNAL MEDICINE

## 2019-11-04 PROCEDURE — 80061 LIPID PANEL: CPT | Performed by: INTERNAL MEDICINE

## 2019-11-04 PROCEDURE — 83036 HEMOGLOBIN GLYCOSYLATED A1C: CPT | Performed by: INTERNAL MEDICINE

## 2019-11-04 PROCEDURE — 80053 COMPREHEN METABOLIC PANEL: CPT | Performed by: INTERNAL MEDICINE

## 2019-11-04 PROCEDURE — 36415 COLL VENOUS BLD VENIPUNCTURE: CPT

## 2019-11-04 PROCEDURE — 84402 ASSAY OF FREE TESTOSTERONE: CPT | Performed by: INTERNAL MEDICINE

## 2019-11-06 LAB
TESTOST FREE SERPL-MCNC: 2.1 PG/ML (ref 6.6–18.1)
TESTOST SERPL-MCNC: 52.2 NG/DL (ref 264–916)

## 2019-11-11 RX ORDER — OXYCODONE AND ACETAMINOPHEN 10; 325 MG/1; MG/1
1 TABLET ORAL EVERY 6 HOURS PRN
Qty: 120 TABLET | Refills: 0 | Status: SHIPPED | OUTPATIENT
Start: 2019-11-11 | End: 2020-03-02 | Stop reason: SDUPTHER

## 2019-11-22 ENCOUNTER — OFFICE VISIT (OUTPATIENT)
Dept: FAMILY MEDICINE CLINIC | Facility: CLINIC | Age: 69
End: 2019-11-22

## 2019-11-22 VITALS
RESPIRATION RATE: 20 BRPM | OXYGEN SATURATION: 96 % | DIASTOLIC BLOOD PRESSURE: 78 MMHG | BODY MASS INDEX: 29.34 KG/M2 | HEART RATE: 88 BPM | SYSTOLIC BLOOD PRESSURE: 142 MMHG | HEIGHT: 72 IN | WEIGHT: 216.6 LBS | TEMPERATURE: 98.1 F

## 2019-11-22 DIAGNOSIS — E78.2 MIXED HYPERLIPIDEMIA: ICD-10-CM

## 2019-11-22 DIAGNOSIS — I25.10 CORONARY ARTERY DISEASE INVOLVING NATIVE CORONARY ARTERY OF NATIVE HEART WITHOUT ANGINA PECTORIS: ICD-10-CM

## 2019-11-22 DIAGNOSIS — E29.1 DEFICIENCY OF TESTOSTERONE BIOSYNTHESIS: ICD-10-CM

## 2019-11-22 DIAGNOSIS — I10 ESSENTIAL HYPERTENSION: Primary | ICD-10-CM

## 2019-11-22 DIAGNOSIS — E11.65 TYPE 2 DIABETES MELLITUS WITH HYPERGLYCEMIA, WITHOUT LONG-TERM CURRENT USE OF INSULIN (HCC): ICD-10-CM

## 2019-11-22 PROCEDURE — 96372 THER/PROPH/DIAG INJ SC/IM: CPT | Performed by: INTERNAL MEDICINE

## 2019-11-22 PROCEDURE — 99214 OFFICE O/P EST MOD 30 MIN: CPT | Performed by: INTERNAL MEDICINE

## 2019-11-22 RX ADMIN — TESTOSTERONE CYPIONATE 200 MG: 200 INJECTION, SOLUTION INTRAMUSCULAR at 10:23

## 2019-12-04 PROBLEM — E11.65 TYPE 2 DIABETES MELLITUS WITH HYPERGLYCEMIA, WITHOUT LONG-TERM CURRENT USE OF INSULIN: Status: ACTIVE | Noted: 2019-12-04

## 2019-12-04 RX ORDER — TESTOSTERONE CYPIONATE 200 MG/ML
400 INJECTION, SOLUTION INTRAMUSCULAR ONCE
Status: COMPLETED | OUTPATIENT
Start: 2019-12-04 | End: 2020-01-06

## 2019-12-09 ENCOUNTER — TELEPHONE (OUTPATIENT)
Dept: FAMILY MEDICINE CLINIC | Facility: CLINIC | Age: 69
End: 2019-12-09

## 2020-01-02 RX ORDER — POTASSIUM CHLORIDE 20 MEQ/1
TABLET, EXTENDED RELEASE ORAL
Qty: 60 TABLET | Refills: 2 | Status: SHIPPED | OUTPATIENT
Start: 2020-01-02 | End: 2020-03-26

## 2020-01-06 ENCOUNTER — CLINICAL SUPPORT (OUTPATIENT)
Dept: FAMILY MEDICINE CLINIC | Facility: CLINIC | Age: 70
End: 2020-01-06

## 2020-01-06 DIAGNOSIS — E29.1 DEFICIENCY OF TESTOSTERONE BIOSYNTHESIS: ICD-10-CM

## 2020-01-06 PROCEDURE — 96372 THER/PROPH/DIAG INJ SC/IM: CPT | Performed by: INTERNAL MEDICINE

## 2020-01-06 RX ADMIN — TESTOSTERONE CYPIONATE 400 MG: 200 INJECTION, SOLUTION INTRAMUSCULAR at 09:47

## 2020-01-09 RX ORDER — ROSUVASTATIN CALCIUM 10 MG/1
TABLET, COATED ORAL
Qty: 90 TABLET | Refills: 0 | Status: SHIPPED | OUTPATIENT
Start: 2020-01-09 | End: 2020-04-06

## 2020-01-17 RX ORDER — LEVOTHYROXINE SODIUM 0.1 MG/1
TABLET ORAL
Qty: 90 TABLET | Refills: 0 | Status: SHIPPED | OUTPATIENT
Start: 2020-01-17 | End: 2020-04-16

## 2020-01-20 ENCOUNTER — CLINICAL SUPPORT (OUTPATIENT)
Dept: FAMILY MEDICINE CLINIC | Facility: CLINIC | Age: 70
End: 2020-01-20

## 2020-01-20 DIAGNOSIS — E29.1 DEFICIENCY OF TESTOSTERONE BIOSYNTHESIS: ICD-10-CM

## 2020-01-20 PROCEDURE — 96372 THER/PROPH/DIAG INJ SC/IM: CPT | Performed by: INTERNAL MEDICINE

## 2020-01-20 RX ADMIN — TESTOSTERONE CYPIONATE 200 MG: 200 INJECTION, SOLUTION INTRAMUSCULAR at 09:31

## 2020-01-24 RX ORDER — FERROUS SULFATE 325(65) MG
TABLET ORAL
Qty: 90 TABLET | Refills: 0 | Status: SHIPPED | OUTPATIENT
Start: 2020-01-24 | End: 2022-01-17

## 2020-02-03 ENCOUNTER — CLINICAL SUPPORT (OUTPATIENT)
Dept: FAMILY MEDICINE CLINIC | Facility: CLINIC | Age: 70
End: 2020-02-03

## 2020-02-03 DIAGNOSIS — E34.9 TESTOSTERONE DEFICIENCY: ICD-10-CM

## 2020-02-03 PROCEDURE — 96372 THER/PROPH/DIAG INJ SC/IM: CPT | Performed by: INTERNAL MEDICINE

## 2020-02-03 RX ADMIN — TESTOSTERONE CYPIONATE 200 MG: 200 INJECTION, SOLUTION INTRAMUSCULAR at 09:23

## 2020-02-14 DIAGNOSIS — E11.65 TYPE 2 DIABETES MELLITUS WITH HYPERGLYCEMIA, WITHOUT LONG-TERM CURRENT USE OF INSULIN (HCC): Primary | ICD-10-CM

## 2020-02-17 ENCOUNTER — CLINICAL SUPPORT (OUTPATIENT)
Dept: FAMILY MEDICINE CLINIC | Facility: CLINIC | Age: 70
End: 2020-02-17

## 2020-02-17 ENCOUNTER — LAB (OUTPATIENT)
Dept: FAMILY MEDICINE CLINIC | Facility: CLINIC | Age: 70
End: 2020-02-17

## 2020-02-17 DIAGNOSIS — E29.1 DEFICIENCY OF TESTOSTERONE BIOSYNTHESIS: ICD-10-CM

## 2020-02-17 DIAGNOSIS — E11.65 TYPE 2 DIABETES MELLITUS WITH HYPERGLYCEMIA, WITHOUT LONG-TERM CURRENT USE OF INSULIN (HCC): ICD-10-CM

## 2020-02-17 LAB
ANION GAP SERPL CALCULATED.3IONS-SCNC: 11.4 MMOL/L (ref 5–15)
BUN BLD-MCNC: 19 MG/DL (ref 8–23)
BUN/CREAT SERPL: 19 (ref 7–25)
CALCIUM SPEC-SCNC: 9.5 MG/DL (ref 8.6–10.5)
CHLORIDE SERPL-SCNC: 99 MMOL/L (ref 98–107)
CO2 SERPL-SCNC: 29.6 MMOL/L (ref 22–29)
CREAT BLD-MCNC: 1 MG/DL (ref 0.76–1.27)
GFR SERPL CREATININE-BSD FRML MDRD: 74 ML/MIN/1.73
GLUCOSE BLD-MCNC: 106 MG/DL (ref 65–99)
POTASSIUM BLD-SCNC: 4.4 MMOL/L (ref 3.5–5.2)
SODIUM BLD-SCNC: 140 MMOL/L (ref 136–145)

## 2020-02-17 PROCEDURE — 80048 BASIC METABOLIC PNL TOTAL CA: CPT | Performed by: INTERNAL MEDICINE

## 2020-02-17 PROCEDURE — 36415 COLL VENOUS BLD VENIPUNCTURE: CPT

## 2020-02-17 PROCEDURE — 96372 THER/PROPH/DIAG INJ SC/IM: CPT | Performed by: INTERNAL MEDICINE

## 2020-02-17 RX ADMIN — TESTOSTERONE CYPIONATE 200 MG: 200 INJECTION, SOLUTION INTRAMUSCULAR at 09:35

## 2020-02-24 ENCOUNTER — TELEPHONE (OUTPATIENT)
Dept: FAMILY MEDICINE CLINIC | Facility: CLINIC | Age: 70
End: 2020-02-24

## 2020-02-24 NOTE — TELEPHONE ENCOUNTER
Mr. August arrived 30 minutes past his appointment time and had to reschedule. Patient thought his appointment was at 8:45 but still arrived at 8:56. He was rescheduled for 3/26 but he is asking if you can work him back in sooner.

## 2020-02-25 NOTE — TELEPHONE ENCOUNTER
Patient was called and informed that there are no openings right now, but we will watch for any cancellations.

## 2020-02-26 RX ORDER — BUMETANIDE 2 MG/1
TABLET ORAL
Qty: 90 TABLET | Refills: 0 | Status: SHIPPED | OUTPATIENT
Start: 2020-02-26 | End: 2020-04-10 | Stop reason: SDUPTHER

## 2020-03-02 ENCOUNTER — TELEPHONE (OUTPATIENT)
Dept: FAMILY MEDICINE CLINIC | Facility: CLINIC | Age: 70
End: 2020-03-02

## 2020-03-02 ENCOUNTER — CLINICAL SUPPORT (OUTPATIENT)
Dept: FAMILY MEDICINE CLINIC | Facility: CLINIC | Age: 70
End: 2020-03-02

## 2020-03-02 DIAGNOSIS — M51.36 DDD (DEGENERATIVE DISC DISEASE), LUMBAR: Primary | ICD-10-CM

## 2020-03-02 DIAGNOSIS — E29.1 MALE HYPOGONADISM: ICD-10-CM

## 2020-03-02 PROCEDURE — 96372 THER/PROPH/DIAG INJ SC/IM: CPT | Performed by: INTERNAL MEDICINE

## 2020-03-02 RX ORDER — OXYCODONE AND ACETAMINOPHEN 10; 325 MG/1; MG/1
1 TABLET ORAL EVERY 6 HOURS PRN
Qty: 120 TABLET | Refills: 0 | Status: SHIPPED | OUTPATIENT
Start: 2020-03-02 | End: 2020-05-11 | Stop reason: SDUPTHER

## 2020-03-02 RX ADMIN — TESTOSTERONE CYPIONATE 200 MG: 200 INJECTION, SOLUTION INTRAMUSCULAR at 09:04

## 2020-03-02 NOTE — TELEPHONE ENCOUNTER
Patient came into office requesting a refill for Oxycotin  mg.  Can we please send this into pharmacy on file for him. Thank you.

## 2020-03-16 ENCOUNTER — CLINICAL SUPPORT (OUTPATIENT)
Dept: FAMILY MEDICINE CLINIC | Facility: CLINIC | Age: 70
End: 2020-03-16

## 2020-03-16 DIAGNOSIS — E34.9 TESTOSTERONE DEFICIENCY: ICD-10-CM

## 2020-03-16 PROCEDURE — 96372 THER/PROPH/DIAG INJ SC/IM: CPT | Performed by: INTERNAL MEDICINE

## 2020-03-16 RX ADMIN — TESTOSTERONE CYPIONATE 200 MG: 200 INJECTION, SOLUTION INTRAMUSCULAR at 09:28

## 2020-03-26 ENCOUNTER — OFFICE VISIT (OUTPATIENT)
Dept: FAMILY MEDICINE CLINIC | Facility: CLINIC | Age: 70
End: 2020-03-26

## 2020-03-26 VITALS
SYSTOLIC BLOOD PRESSURE: 146 MMHG | TEMPERATURE: 99 F | DIASTOLIC BLOOD PRESSURE: 78 MMHG | WEIGHT: 219 LBS | RESPIRATION RATE: 20 BRPM | OXYGEN SATURATION: 96 % | BODY MASS INDEX: 29.66 KG/M2 | HEIGHT: 72 IN | HEART RATE: 80 BPM

## 2020-03-26 DIAGNOSIS — E03.9 HYPOTHYROIDISM, UNSPECIFIED TYPE: ICD-10-CM

## 2020-03-26 DIAGNOSIS — M79.89 LEG SWELLING: ICD-10-CM

## 2020-03-26 DIAGNOSIS — I10 ESSENTIAL HYPERTENSION: Primary | ICD-10-CM

## 2020-03-26 DIAGNOSIS — R35.1 NOCTURIA: ICD-10-CM

## 2020-03-26 DIAGNOSIS — E78.2 MIXED HYPERLIPIDEMIA: ICD-10-CM

## 2020-03-26 DIAGNOSIS — E29.1 HYPOGONADISM IN MALE: ICD-10-CM

## 2020-03-26 DIAGNOSIS — R53.82 CHRONIC FATIGUE: ICD-10-CM

## 2020-03-26 DIAGNOSIS — I42.9 CARDIOMYOPATHY, UNSPECIFIED TYPE (HCC): ICD-10-CM

## 2020-03-26 DIAGNOSIS — E11.65 TYPE 2 DIABETES MELLITUS WITH HYPERGLYCEMIA, WITHOUT LONG-TERM CURRENT USE OF INSULIN (HCC): ICD-10-CM

## 2020-03-26 PROCEDURE — 99214 OFFICE O/P EST MOD 30 MIN: CPT | Performed by: INTERNAL MEDICINE

## 2020-03-26 RX ORDER — POTASSIUM CHLORIDE 20 MEQ/1
TABLET, EXTENDED RELEASE ORAL DAILY
COMMUNITY
End: 2020-05-07

## 2020-03-26 RX ORDER — IMIQUIMOD 12.5 MG/.25G
CREAM TOPICAL
COMMUNITY
Start: 2020-02-25 | End: 2020-09-10

## 2020-03-26 NOTE — PROGRESS NOTES
Subjective   Arik August is a 69 y.o. male.     Pt is here for med check htn, dm, hypogonadism, and heart failure  Due for labs today  Noticed a couple of weeks ago that his ankles were swollen  Took an extra diuretic and that helped  But overall, has noticed that he doesn't feel as well as he did  Not as energetic  And has noticed that he's gained weight too, though admittedly is not watching his diet  As closely as he should be  He does not check his bp at home  And since his CABG, has had 4 brief episodes of chest pain  He doesn't get chest pain with exercise though    Pt also notes cough/congestion for the last 3 days  Is taking an otc cough/cold med  Seems to be getting better  No fever or shortness of breath       The following portions of the patient's history were reviewed and updated as appropriate: allergies, current medications, past family history, past medical history, past social history, past surgical history and problem list.    Review of Systems   Constitutional: Positive for fatigue. Negative for fever.   HENT: Positive for congestion. Negative for ear pain, rhinorrhea and sore throat.    Eyes: Negative for blurred vision and itching.   Respiratory: Positive for cough. Negative for shortness of breath.    Cardiovascular: Positive for leg swelling. Negative for chest pain and palpitations.   Gastrointestinal: Negative for abdominal pain, diarrhea and vomiting.   Endocrine: Negative for polydipsia and polyuria.   Genitourinary: Negative for dysuria, frequency, hematuria and urgency.   Musculoskeletal: Negative for joint swelling and myalgias.   Skin: Negative for rash and skin lesions.   Neurological: Negative for dizziness, numbness and headache.   Psychiatric/Behavioral: Negative for depressed mood. The patient is not nervous/anxious.          Current Outpatient Medications:   •  aspirin  MG tablet, Take 325 mg by mouth Every 6 (Six) Hours As Needed., Disp: , Rfl:   •  baclofen (LIORESAL)  10 MG tablet, Take 10 mg by mouth As Needed., Disp: , Rfl:   •  bumetanide (BUMEX) 2 MG tablet, TAKE ONE TABLET BY MOUTH DAILY, Disp: 90 tablet, Rfl: 0  •  colchicine 0.6 MG tablet, , Disp: , Rfl:   •  cycloSPORINE (RESTASIS) 0.05 % ophthalmic emulsion, RESTASIS 0.05 % EMUL, Disp: , Rfl:   •  diclofenac (VOLTAREN) 75 MG EC tablet, TAKE ONE TABLET BY MOUTH TWICE A DAY AS NEEDED, Disp: 180 tablet, Rfl: 2  •  ferrous sulfate 325 (65 FE) MG tablet, TAKE ONE TABLET BY MOUTH DAILY, Disp: 90 tablet, Rfl: 0  •  folic acid (FOLVITE) 1 MG tablet, 2 tablets daily, Disp: , Rfl:   •  imiquimod (ALDARA) 5 % cream, , Disp: , Rfl:   •  levothyroxine (SYNTHROID, LEVOTHROID) 100 MCG tablet, TAKE ONE TABLET BY MOUTH DAILY, Disp: 90 tablet, Rfl: 0  •  magnesium oxide (MAGOX) 400 (241.3 Mg) MG tablet tablet, TAKE ONE TABLET BY MOUTH DAILY, Disp: 90 tablet, Rfl: 0  •  Melatonin 10 MG capsule, Take  by mouth Every Night., Disp: , Rfl:   •  methotrexate 2.5 MG tablet, Take  by mouth. 6 tablets weekly, Disp: , Rfl:   •  metoprolol tartrate (LOPRESSOR) 25 MG tablet, TAKE ONE-HALF TABLET BY MOUTH TWICE A DAY, Disp: 30 tablet, Rfl: 2  •  Multiple Vitamins-Minerals (DAILY MULTIVITAMIN) capsule, DAILY MULTIVITAMIN CAPS, Disp: , Rfl:   •  olopatadine (PATADAY) 0.2 % solution ophthalmic solution, Administer 1 drop to both eyes Daily., Disp: 1 bottle, Rfl: 3  •  oxyCODONE-acetaminophen (PERCOCET)  MG per tablet, Take 1 tablet by mouth Every 6 (Six) Hours As Needed for Moderate Pain ., Disp: 120 tablet, Rfl: 0  •  potassium chloride (K-DUR,KLOR-CON) 20 MEQ CR tablet, Daily., Disp: , Rfl:   •  rosuvastatin (CRESTOR) 10 MG tablet, TAKE ONE TABLET BY MOUTH DAILY, Disp: 90 tablet, Rfl: 0    Current Facility-Administered Medications:   •  Testosterone Cypionate (DEPOTESTOTERONE CYPIONATE) injection 200 mg, 200 mg, Intramuscular, Q14 Days, Abbi Rosales MD, 200 mg at 03/16/20 0928    Objective   /78 (BP Location: Left arm, Patient  "Position: Sitting, Cuff Size: Large Adult)   Pulse 80   Temp 99 °F (37.2 °C) (Oral)   Resp 20   Ht 182.9 cm (72\")   Wt 99.3 kg (219 lb)   SpO2 96%   BMI 29.70 kg/m²   Physical Exam   Constitutional: He is oriented to person, place, and time. He appears well-developed and well-nourished. No distress.   HENT:   Head: Normocephalic and atraumatic.   Mouth/Throat: Oropharynx is clear and moist. No oropharyngeal exudate.   Eyes: Conjunctivae and EOM are normal. Right eye exhibits no discharge. Left eye exhibits no discharge. No scleral icterus.   Neck: Normal range of motion. Neck supple. No thyromegaly present.   Cardiovascular: Normal rate, regular rhythm and normal heart sounds. Exam reveals no gallop and no friction rub.   No murmur heard.  Pulmonary/Chest: Effort normal and breath sounds normal. No respiratory distress. He has no wheezes. He has no rales.   Musculoskeletal: Normal range of motion. He exhibits no edema or deformity.   Lymphadenopathy:     He has no cervical adenopathy.   Neurological: He is alert and oriented to person, place, and time. No cranial nerve deficit.   Skin: Skin is warm and dry. No rash noted. He is not diaphoretic. No erythema.   Psychiatric: He has a normal mood and affect. His behavior is normal. Thought content normal.   Vitals reviewed.        Assessment/Plan   Problems Addressed this Visit        Cardiovascular and Mediastinum    Mixed hyperlipidemia    Relevant Orders    CBC Auto Differential    Comprehensive Metabolic Panel    Lipid Panel    Essential hypertension - Primary    Relevant Medications    metoprolol tartrate (LOPRESSOR) 25 MG tablet    Other Relevant Orders    CBC Auto Differential    Comprehensive Metabolic Panel    Lipid Panel       Endocrine    Type 2 diabetes mellitus with hyperglycemia, without long-term current use of insulin (CMS/[facility])    Relevant Orders    Hemoglobin A1c      Other Visit Diagnoses     Hypothyroidism, unspecified type        Relevant " Medications    metoprolol tartrate (LOPRESSOR) 25 MG tablet    Other Relevant Orders    TSH    T4, free    Chronic fatigue        Relevant Orders    proBNP    Leg swelling        Relevant Orders    proBNP    Uric acid    Cardiomyopathy, unspecified type (CMS/HCC)         Relevant Medications    metoprolol tartrate (LOPRESSOR) 25 MG tablet    Other Relevant Orders    proBNP    Hypogonadism in male        Relevant Orders    Testosterone (Free & Total), LC / MS    PSA DIAGNOSTIC ONLY    Nocturia         Relevant Orders    PSA DIAGNOSTIC ONLY          Ok to continue otc cold med since his sxs are getting better  With weight gain, fatigue, may be retaining more fluid  Will check probnp  Pt is due for labs anyway but not fasting today  So will return for labs + t shot Monday  bp is elevated - increase metoprolol for 0.5 tab bid to 1 tab bid         Procedures

## 2020-03-30 ENCOUNTER — LAB (OUTPATIENT)
Dept: FAMILY MEDICINE CLINIC | Facility: CLINIC | Age: 70
End: 2020-03-30

## 2020-03-30 ENCOUNTER — CLINICAL SUPPORT (OUTPATIENT)
Dept: FAMILY MEDICINE CLINIC | Facility: CLINIC | Age: 70
End: 2020-03-30

## 2020-03-30 DIAGNOSIS — M79.89 LEG SWELLING: ICD-10-CM

## 2020-03-30 DIAGNOSIS — I42.9 CARDIOMYOPATHY, UNSPECIFIED TYPE (HCC): ICD-10-CM

## 2020-03-30 DIAGNOSIS — I10 ESSENTIAL HYPERTENSION: ICD-10-CM

## 2020-03-30 DIAGNOSIS — E03.9 HYPOTHYROIDISM, UNSPECIFIED TYPE: ICD-10-CM

## 2020-03-30 DIAGNOSIS — R53.82 CHRONIC FATIGUE: ICD-10-CM

## 2020-03-30 DIAGNOSIS — E78.2 MIXED HYPERLIPIDEMIA: ICD-10-CM

## 2020-03-30 DIAGNOSIS — R35.1 NOCTURIA: ICD-10-CM

## 2020-03-30 DIAGNOSIS — E29.1 HYPOGONADISM IN MALE: ICD-10-CM

## 2020-03-30 DIAGNOSIS — E11.65 TYPE 2 DIABETES MELLITUS WITH HYPERGLYCEMIA, WITHOUT LONG-TERM CURRENT USE OF INSULIN (HCC): ICD-10-CM

## 2020-03-30 DIAGNOSIS — E34.9 TESTOSTERONE DEFICIENCY: ICD-10-CM

## 2020-03-30 LAB
ALBUMIN SERPL-MCNC: 4.6 G/DL (ref 3.5–5.2)
ALBUMIN/GLOB SERPL: 1.7 G/DL
ALP SERPL-CCNC: 64 U/L (ref 39–117)
ALT SERPL W P-5'-P-CCNC: 29 U/L (ref 1–41)
ANION GAP SERPL CALCULATED.3IONS-SCNC: 13.9 MMOL/L (ref 5–15)
AST SERPL-CCNC: 27 U/L (ref 1–40)
BASOPHILS # BLD AUTO: 0.04 10*3/MM3 (ref 0–0.2)
BASOPHILS NFR BLD AUTO: 0.6 % (ref 0–1.5)
BILIRUB SERPL-MCNC: 0.5 MG/DL (ref 0.2–1.2)
BUN BLD-MCNC: 27 MG/DL (ref 8–23)
BUN/CREAT SERPL: 25.5 (ref 7–25)
CALCIUM SPEC-SCNC: 9.5 MG/DL (ref 8.6–10.5)
CHLORIDE SERPL-SCNC: 96 MMOL/L (ref 98–107)
CHOLEST SERPL-MCNC: 140 MG/DL (ref 0–200)
CO2 SERPL-SCNC: 28.1 MMOL/L (ref 22–29)
CREAT BLD-MCNC: 1.06 MG/DL (ref 0.76–1.27)
DEPRECATED RDW RBC AUTO: 47.9 FL (ref 37–54)
EOSINOPHIL # BLD AUTO: 0.29 10*3/MM3 (ref 0–0.4)
EOSINOPHIL NFR BLD AUTO: 4.2 % (ref 0.3–6.2)
ERYTHROCYTE [DISTWIDTH] IN BLOOD BY AUTOMATED COUNT: 13.7 % (ref 12.3–15.4)
GFR SERPL CREATININE-BSD FRML MDRD: 69 ML/MIN/1.73
GLOBULIN UR ELPH-MCNC: 2.7 GM/DL
GLUCOSE BLD-MCNC: 109 MG/DL (ref 65–99)
HBA1C MFR BLD: 6.2 % (ref 3.5–5.6)
HCT VFR BLD AUTO: 50.1 % (ref 37.5–51)
HDLC SERPL-MCNC: 30 MG/DL (ref 40–60)
HGB BLD-MCNC: 16.8 G/DL (ref 13–17.7)
IMM GRANULOCYTES # BLD AUTO: 0.02 10*3/MM3 (ref 0–0.05)
IMM GRANULOCYTES NFR BLD AUTO: 0.3 % (ref 0–0.5)
LDLC SERPL CALC-MCNC: 77 MG/DL (ref 0–100)
LDLC/HDLC SERPL: 2.55 {RATIO}
LYMPHOCYTES # BLD AUTO: 1.21 10*3/MM3 (ref 0.7–3.1)
LYMPHOCYTES NFR BLD AUTO: 17.6 % (ref 19.6–45.3)
MCH RBC QN AUTO: 32.1 PG (ref 26.6–33)
MCHC RBC AUTO-ENTMCNC: 33.5 G/DL (ref 31.5–35.7)
MCV RBC AUTO: 95.6 FL (ref 79–97)
MONOCYTES # BLD AUTO: 0.74 10*3/MM3 (ref 0.1–0.9)
MONOCYTES NFR BLD AUTO: 10.8 % (ref 5–12)
NEUTROPHILS # BLD AUTO: 4.57 10*3/MM3 (ref 1.7–7)
NEUTROPHILS NFR BLD AUTO: 66.5 % (ref 42.7–76)
NRBC BLD AUTO-RTO: 0 /100 WBC (ref 0–0.2)
NT-PROBNP SERPL-MCNC: 241.9 PG/ML (ref 5–900)
PLATELET # BLD AUTO: 271 10*3/MM3 (ref 140–450)
PMV BLD AUTO: 10.2 FL (ref 6–12)
POTASSIUM BLD-SCNC: 4.2 MMOL/L (ref 3.5–5.2)
PROT SERPL-MCNC: 7.3 G/DL (ref 6–8.5)
PSA SERPL-MCNC: 1.29 NG/ML (ref 0–4)
RBC # BLD AUTO: 5.24 10*6/MM3 (ref 4.14–5.8)
SODIUM BLD-SCNC: 138 MMOL/L (ref 136–145)
T4 FREE SERPL-MCNC: 1.11 NG/DL (ref 0.93–1.7)
TRIGL SERPL-MCNC: 167 MG/DL (ref 0–150)
TSH SERPL DL<=0.05 MIU/L-ACNC: 1.26 UIU/ML (ref 0.27–4.2)
URATE SERPL-MCNC: 7.4 MG/DL (ref 3.4–7)
VLDLC SERPL-MCNC: 33.4 MG/DL (ref 5–40)
WBC NRBC COR # BLD: 6.87 10*3/MM3 (ref 3.4–10.8)

## 2020-03-30 PROCEDURE — 84153 ASSAY OF PSA TOTAL: CPT | Performed by: INTERNAL MEDICINE

## 2020-03-30 PROCEDURE — 36415 COLL VENOUS BLD VENIPUNCTURE: CPT

## 2020-03-30 PROCEDURE — 84439 ASSAY OF FREE THYROXINE: CPT | Performed by: INTERNAL MEDICINE

## 2020-03-30 PROCEDURE — 96372 THER/PROPH/DIAG INJ SC/IM: CPT | Performed by: INTERNAL MEDICINE

## 2020-03-30 PROCEDURE — 83880 ASSAY OF NATRIURETIC PEPTIDE: CPT | Performed by: INTERNAL MEDICINE

## 2020-03-30 PROCEDURE — 84550 ASSAY OF BLOOD/URIC ACID: CPT | Performed by: INTERNAL MEDICINE

## 2020-03-30 PROCEDURE — 84443 ASSAY THYROID STIM HORMONE: CPT | Performed by: INTERNAL MEDICINE

## 2020-03-30 PROCEDURE — 83036 HEMOGLOBIN GLYCOSYLATED A1C: CPT | Performed by: INTERNAL MEDICINE

## 2020-03-30 PROCEDURE — 80053 COMPREHEN METABOLIC PANEL: CPT | Performed by: INTERNAL MEDICINE

## 2020-03-30 PROCEDURE — 85025 COMPLETE CBC W/AUTO DIFF WBC: CPT | Performed by: INTERNAL MEDICINE

## 2020-03-30 PROCEDURE — 84402 ASSAY OF FREE TESTOSTERONE: CPT | Performed by: INTERNAL MEDICINE

## 2020-03-30 PROCEDURE — 80061 LIPID PANEL: CPT | Performed by: INTERNAL MEDICINE

## 2020-03-30 PROCEDURE — 84403 ASSAY OF TOTAL TESTOSTERONE: CPT | Performed by: INTERNAL MEDICINE

## 2020-03-30 RX ADMIN — TESTOSTERONE CYPIONATE 200 MG: 200 INJECTION, SOLUTION INTRAMUSCULAR at 09:05

## 2020-04-01 LAB
TESTOST FREE SERPL-MCNC: 6.6 PG/ML (ref 6.6–18.1)
TESTOST SERPL-MCNC: 293.8 NG/DL (ref 264–916)

## 2020-04-06 RX ORDER — ROSUVASTATIN CALCIUM 10 MG/1
TABLET, COATED ORAL
Qty: 90 TABLET | Refills: 0 | Status: SHIPPED | OUTPATIENT
Start: 2020-04-06 | End: 2020-07-06

## 2020-04-09 RX ORDER — ALLOPURINOL 300 MG/1
300 TABLET ORAL DAILY
Qty: 90 TABLET | Refills: 0 | Status: SHIPPED | OUTPATIENT
Start: 2020-04-09 | End: 2020-07-06

## 2020-04-09 NOTE — PROGRESS NOTES
Gave pt results. He was taking colchicine daily so he has been instructed to only take during flare up and to begin taking the allopurinol daily. Pt would like that sent to [facility] in chart.    Pt also c/o ankle swelling so he started taking his bumex twice a day and it has helped. He wanted to check if that was ok.

## 2020-04-10 ENCOUNTER — TELEPHONE (OUTPATIENT)
Dept: FAMILY MEDICINE CLINIC | Facility: CLINIC | Age: 70
End: 2020-04-10

## 2020-04-10 DIAGNOSIS — I25.5 ISCHEMIC CARDIOMYOPATHY: Primary | ICD-10-CM

## 2020-04-10 DIAGNOSIS — I11.0 HYPERTENSIVE HEART DISEASE WITH HEART FAILURE (HCC): ICD-10-CM

## 2020-04-10 RX ORDER — BUMETANIDE 2 MG/1
2 TABLET ORAL 2 TIMES DAILY PRN
Qty: 180 TABLET | Refills: 1 | Status: SHIPPED | OUTPATIENT
Start: 2020-04-10 | End: 2020-05-26

## 2020-04-10 NOTE — TELEPHONE ENCOUNTER
----- Message from Abbi Rosales MD sent at 4/9/2020  5:56 PM EDT -----  It's ok to double bumex for a few days at a time, but if he does it for much longer, he'll run out of meds really early and we'll need to check labs sooner  How long does he think he's been taking it twice a day?

## 2020-04-10 NOTE — TELEPHONE ENCOUNTER
I scheduled [facility] appt on Monday at 9am and gave message to patient's wife.

## 2020-04-10 NOTE — TELEPHONE ENCOUNTER
Spoke with patient at 1:11pm.  He said he has been taking twice daily for approximately two weeks.  What do you want him to do now?  If you want him to stay on twice daily then he will need it sent in to pharmacy because he will run out earlier.  You mentioned doing labwork sooner.  When would you want it done and what do you want him to have?

## 2020-04-10 NOTE — TELEPHONE ENCOUNTER
ANDREA TO SHARE    Spoke to wife she will have him call back to tell how long he has been taking 2 of the bumex.

## 2020-04-13 ENCOUNTER — CLINICAL SUPPORT (OUTPATIENT)
Dept: FAMILY MEDICINE CLINIC | Facility: CLINIC | Age: 70
End: 2020-04-13

## 2020-04-13 ENCOUNTER — LAB (OUTPATIENT)
Dept: FAMILY MEDICINE CLINIC | Facility: CLINIC | Age: 70
End: 2020-04-13

## 2020-04-13 DIAGNOSIS — I11.0 HYPERTENSIVE HEART DISEASE WITH HEART FAILURE (HCC): ICD-10-CM

## 2020-04-13 DIAGNOSIS — I25.5 ISCHEMIC CARDIOMYOPATHY: ICD-10-CM

## 2020-04-13 DIAGNOSIS — E29.1 HYPOGONADISM MALE: ICD-10-CM

## 2020-04-13 LAB
ANION GAP SERPL CALCULATED.3IONS-SCNC: 12.7 MMOL/L (ref 5–15)
BUN BLD-MCNC: 23 MG/DL (ref 8–23)
BUN/CREAT SERPL: 20 (ref 7–25)
CALCIUM SPEC-SCNC: 9.6 MG/DL (ref 8.6–10.5)
CHLORIDE SERPL-SCNC: 96 MMOL/L (ref 98–107)
CO2 SERPL-SCNC: 31.3 MMOL/L (ref 22–29)
CREAT BLD-MCNC: 1.15 MG/DL (ref 0.76–1.27)
GFR SERPL CREATININE-BSD FRML MDRD: 63 ML/MIN/1.73
GLUCOSE BLD-MCNC: 118 MG/DL (ref 65–99)
NT-PROBNP SERPL-MCNC: 258.5 PG/ML (ref 5–900)
POTASSIUM BLD-SCNC: 4 MMOL/L (ref 3.5–5.2)
SODIUM BLD-SCNC: 140 MMOL/L (ref 136–145)

## 2020-04-13 PROCEDURE — 96372 THER/PROPH/DIAG INJ SC/IM: CPT | Performed by: INTERNAL MEDICINE

## 2020-04-13 PROCEDURE — 83880 ASSAY OF NATRIURETIC PEPTIDE: CPT | Performed by: INTERNAL MEDICINE

## 2020-04-13 PROCEDURE — 36415 COLL VENOUS BLD VENIPUNCTURE: CPT

## 2020-04-13 PROCEDURE — 80048 BASIC METABOLIC PNL TOTAL CA: CPT | Performed by: INTERNAL MEDICINE

## 2020-04-13 RX ADMIN — TESTOSTERONE CYPIONATE 200 MG: 200 INJECTION, SOLUTION INTRAMUSCULAR at 09:01

## 2020-04-16 RX ORDER — LEVOTHYROXINE SODIUM 0.1 MG/1
TABLET ORAL
Qty: 90 TABLET | Refills: 0 | Status: SHIPPED | OUTPATIENT
Start: 2020-04-16 | End: 2020-07-20

## 2020-04-27 ENCOUNTER — CLINICAL SUPPORT (OUTPATIENT)
Dept: FAMILY MEDICINE CLINIC | Facility: CLINIC | Age: 70
End: 2020-04-27

## 2020-04-27 DIAGNOSIS — E29.1 MALE HYPOGONADISM: ICD-10-CM

## 2020-04-27 PROCEDURE — 96372 THER/PROPH/DIAG INJ SC/IM: CPT | Performed by: INTERNAL MEDICINE

## 2020-04-27 RX ADMIN — TESTOSTERONE CYPIONATE 200 MG: 200 INJECTION, SOLUTION INTRAMUSCULAR at 09:12

## 2020-05-07 RX ORDER — POTASSIUM CHLORIDE 20 MEQ/1
TABLET, EXTENDED RELEASE ORAL
Qty: 60 TABLET | Refills: 1 | Status: SHIPPED | OUTPATIENT
Start: 2020-05-07 | End: 2020-07-07

## 2020-05-11 ENCOUNTER — CLINICAL SUPPORT (OUTPATIENT)
Dept: FAMILY MEDICINE CLINIC | Facility: CLINIC | Age: 70
End: 2020-05-11

## 2020-05-11 ENCOUNTER — TELEPHONE (OUTPATIENT)
Dept: FAMILY MEDICINE CLINIC | Facility: CLINIC | Age: 70
End: 2020-05-11

## 2020-05-11 DIAGNOSIS — E29.1 DEFICIENCY OF TESTOSTERONE BIOSYNTHESIS: ICD-10-CM

## 2020-05-11 DIAGNOSIS — M51.36 DDD (DEGENERATIVE DISC DISEASE), LUMBAR: ICD-10-CM

## 2020-05-11 PROCEDURE — 96372 THER/PROPH/DIAG INJ SC/IM: CPT | Performed by: INTERNAL MEDICINE

## 2020-05-11 RX ORDER — OXYCODONE AND ACETAMINOPHEN 10; 325 MG/1; MG/1
1 TABLET ORAL EVERY 6 HOURS PRN
Qty: 120 TABLET | Refills: 0 | Status: SHIPPED | OUTPATIENT
Start: 2020-05-11 | End: 2020-07-27 | Stop reason: SDUPTHER

## 2020-05-11 RX ADMIN — TESTOSTERONE CYPIONATE 200 MG: 200 INJECTION, SOLUTION INTRAMUSCULAR at 08:35

## 2020-05-26 ENCOUNTER — CLINICAL SUPPORT (OUTPATIENT)
Dept: FAMILY MEDICINE CLINIC | Facility: CLINIC | Age: 70
End: 2020-05-26

## 2020-05-26 DIAGNOSIS — E29.1 MALE HYPOGONADISM: ICD-10-CM

## 2020-05-26 PROCEDURE — 96372 THER/PROPH/DIAG INJ SC/IM: CPT | Performed by: INTERNAL MEDICINE

## 2020-05-26 RX ORDER — BUMETANIDE 2 MG/1
TABLET ORAL
Qty: 90 TABLET | Refills: 0 | Status: SHIPPED | OUTPATIENT
Start: 2020-05-26 | End: 2020-10-05

## 2020-05-26 RX ADMIN — TESTOSTERONE CYPIONATE 200 MG: 200 INJECTION, SOLUTION INTRAMUSCULAR at 08:38

## 2020-06-08 ENCOUNTER — CLINICAL SUPPORT (OUTPATIENT)
Dept: FAMILY MEDICINE CLINIC | Facility: CLINIC | Age: 70
End: 2020-06-08

## 2020-06-08 DIAGNOSIS — E29.1 MALE HYPOGONADISM: ICD-10-CM

## 2020-06-08 PROCEDURE — 96372 THER/PROPH/DIAG INJ SC/IM: CPT | Performed by: INTERNAL MEDICINE

## 2020-06-08 RX ADMIN — TESTOSTERONE CYPIONATE 200 MG: 200 INJECTION, SOLUTION INTRAMUSCULAR at 08:44

## 2020-06-15 RX ORDER — DICLOFENAC SODIUM 75 MG/1
TABLET, DELAYED RELEASE ORAL
Qty: 180 TABLET | Refills: 1 | Status: SHIPPED | OUTPATIENT
Start: 2020-06-15 | End: 2020-12-11

## 2020-06-19 ENCOUNTER — TELEPHONE (OUTPATIENT)
Dept: FAMILY MEDICINE CLINIC | Facility: CLINIC | Age: 70
End: 2020-06-19

## 2020-06-19 NOTE — TELEPHONE ENCOUNTER
It could be the aspirin, or the combination of aspirin and diclofenac.  I would stop the aspirin. If the bleeding stops, then try a baby aspirin (81mg daily) instead.

## 2020-06-19 NOTE — TELEPHONE ENCOUNTER
Patient called and stated that he has been having an nose bleed everyday for 2 weeks. He stated that he has one bad today. He stated he has been taking some Asprin 300 mg. He wanted to be advised if that could be the problem. He also advised that today when he bent over it just starting running out. Patient advised this is recent he hasn't had nose bleeds before.     Please advise patient [phone].

## 2020-06-22 ENCOUNTER — CLINICAL SUPPORT (OUTPATIENT)
Dept: FAMILY MEDICINE CLINIC | Facility: CLINIC | Age: 70
End: 2020-06-22

## 2020-06-22 DIAGNOSIS — E29.1 DEFICIENCY OF TESTOSTERONE BIOSYNTHESIS: ICD-10-CM

## 2020-06-22 DIAGNOSIS — E29.1 TESTOSTERONE DEFICIENCY IN MALE: ICD-10-CM

## 2020-06-22 PROCEDURE — 96372 THER/PROPH/DIAG INJ SC/IM: CPT | Performed by: INTERNAL MEDICINE

## 2020-06-22 RX ADMIN — TESTOSTERONE CYPIONATE 200 MG: 200 INJECTION, SOLUTION INTRAMUSCULAR at 09:17

## 2020-07-06 RX ORDER — ROSUVASTATIN CALCIUM 10 MG/1
TABLET, COATED ORAL
Qty: 90 TABLET | Refills: 0 | Status: SHIPPED | OUTPATIENT
Start: 2020-07-06 | End: 2020-10-02

## 2020-07-06 RX ORDER — ALLOPURINOL 300 MG/1
TABLET ORAL
Qty: 90 TABLET | Refills: 0 | Status: SHIPPED | OUTPATIENT
Start: 2020-07-06 | End: 2020-10-02

## 2020-07-07 RX ORDER — POTASSIUM CHLORIDE 1500 MG/1
TABLET, EXTENDED RELEASE ORAL
Qty: 60 TABLET | Refills: 0 | Status: SHIPPED | OUTPATIENT
Start: 2020-07-07 | End: 2020-09-21

## 2020-07-10 ENCOUNTER — TELEPHONE (OUTPATIENT)
Dept: FAMILY MEDICINE CLINIC | Facility: CLINIC | Age: 70
End: 2020-07-10

## 2020-07-17 ENCOUNTER — OFFICE VISIT (OUTPATIENT)
Dept: FAMILY MEDICINE CLINIC | Facility: CLINIC | Age: 70
End: 2020-07-17

## 2020-07-17 VITALS
HEART RATE: 67 BPM | RESPIRATION RATE: 16 BRPM | BODY MASS INDEX: 28.58 KG/M2 | HEIGHT: 72 IN | WEIGHT: 211 LBS | DIASTOLIC BLOOD PRESSURE: 96 MMHG | TEMPERATURE: 98.2 F | SYSTOLIC BLOOD PRESSURE: 142 MMHG

## 2020-07-17 DIAGNOSIS — E11.65 TYPE 2 DIABETES MELLITUS WITH HYPERGLYCEMIA, WITHOUT LONG-TERM CURRENT USE OF INSULIN (HCC): Primary | ICD-10-CM

## 2020-07-17 DIAGNOSIS — E34.9 TESTOSTERONE DEFICIENCY: ICD-10-CM

## 2020-07-17 DIAGNOSIS — E29.1 MALE HYPOGONADISM: ICD-10-CM

## 2020-07-17 DIAGNOSIS — I10 ESSENTIAL HYPERTENSION: ICD-10-CM

## 2020-07-17 DIAGNOSIS — E78.5 HYPERLIPIDEMIA, UNSPECIFIED HYPERLIPIDEMIA TYPE: ICD-10-CM

## 2020-07-17 DIAGNOSIS — Z12.5 ENCOUNTER FOR SCREENING FOR MALIGNANT NEOPLASM OF PROSTATE: ICD-10-CM

## 2020-07-17 DIAGNOSIS — I50.22 CHRONIC SYSTOLIC (CONGESTIVE) HEART FAILURE (HCC): ICD-10-CM

## 2020-07-17 DIAGNOSIS — E78.2 MIXED HYPERLIPIDEMIA: ICD-10-CM

## 2020-07-17 DIAGNOSIS — E11.65 TYPE 2 DIABETES MELLITUS WITH HYPERGLYCEMIA, WITHOUT LONG-TERM CURRENT USE OF INSULIN (HCC): ICD-10-CM

## 2020-07-17 DIAGNOSIS — I25.10 CORONARY ARTERY DISEASE INVOLVING NATIVE CORONARY ARTERY OF NATIVE HEART WITHOUT ANGINA PECTORIS: Primary | ICD-10-CM

## 2020-07-17 DIAGNOSIS — E29.1 HYPOGONADISM MALE: ICD-10-CM

## 2020-07-17 DIAGNOSIS — I25.5 ISCHEMIC CARDIOMYOPATHY: ICD-10-CM

## 2020-07-17 PROCEDURE — 99214 OFFICE O/P EST MOD 30 MIN: CPT | Performed by: INTERNAL MEDICINE

## 2020-07-17 RX ORDER — ASPIRIN 81 MG/1
81 TABLET ORAL DAILY
Start: 2020-07-17 | End: 2022-01-17

## 2020-07-17 RX ORDER — LISINOPRIL 5 MG/1
5 TABLET ORAL DAILY
Qty: 90 TABLET | Refills: 0 | Status: SHIPPED | OUTPATIENT
Start: 2020-07-17 | End: 2020-10-14

## 2020-07-17 NOTE — PROGRESS NOTES
Subjective   Arik August is a 70 y.o. male.     Pt is here for med check low testosterone, dm, htn, hpld, DDD  Due for labs today  Thinks today's bp is a little high bc of pain  But hasn't been checking it at home  Is planning to have another surgery for his spine  Unfortunately doing worse with pain and gait    No chest pain, SOA  No abd pain, N/V/D  No fever, cough  No headache, dizziness    Hasn't been able to get his t-shots  So has been feeling fatigued, lack of motivation  Decreased endurance again       The following portions of the patient's history were reviewed and updated as appropriate: allergies, current medications, past family history, past medical history, past social history, past surgical history and problem list.    Review of Systems   Constitutional: Positive for fatigue. Negative for fever.   HENT: Negative for congestion, ear pain, rhinorrhea and sore throat.    Eyes: Negative for blurred vision and itching.   Respiratory: Negative for cough and shortness of breath.    Cardiovascular: Negative for chest pain and palpitations.   Gastrointestinal: Negative for abdominal pain, diarrhea and vomiting.   Endocrine: Negative for polydipsia and polyuria.   Genitourinary: Negative for dysuria, frequency, hematuria and urgency.   Musculoskeletal: Positive for arthralgias, back pain and gait problem. Negative for joint swelling and myalgias.   Skin: Negative for rash and skin lesions.   Neurological: Negative for dizziness, numbness and headache.   Psychiatric/Behavioral: Negative for depressed mood. The patient is not nervous/anxious.          Current Outpatient Medications:   •  allopurinol (ZYLOPRIM) 300 MG tablet, TAKE ONE TABLET BY MOUTH DAILY, Disp: 90 tablet, Rfl: 0  •  baclofen (LIORESAL) 10 MG tablet, Take 10 mg by mouth As Needed., Disp: , Rfl:   •  bumetanide (BUMEX) 2 MG tablet, TAKE ONE TABLET BY MOUTH DAILY, Disp: 90 tablet, Rfl: 0  •  cycloSPORINE (RESTASIS) 0.05 % ophthalmic emulsion,  RESTASIS 0.05 % EMUL, Disp: , Rfl:   •  diclofenac (VOLTAREN) 75 MG EC tablet, TAKE ONE TABLET BY MOUTH TWICE A DAY AS NEEDED, Disp: 180 tablet, Rfl: 1  •  ferrous sulfate 325 (65 FE) MG tablet, TAKE ONE TABLET BY MOUTH DAILY, Disp: 90 tablet, Rfl: 0  •  folic acid (FOLVITE) 1 MG tablet, 2 tablets daily, Disp: , Rfl:   •  imiquimod (ALDARA) 5 % cream, , Disp: , Rfl:   •  KLOR-CON 20 MEQ CR tablet, TAKE ONE TABLET BY MOUTH TWICE A DAY, Disp: 60 tablet, Rfl: 0  •  levothyroxine (SYNTHROID, LEVOTHROID) 100 MCG tablet, TAKE ONE TABLET BY MOUTH DAILY, Disp: 90 tablet, Rfl: 0  •  magnesium oxide (MAGOX) 400 (241.3 Mg) MG tablet tablet, TAKE ONE TABLET BY MOUTH DAILY, Disp: 90 tablet, Rfl: 0  •  Melatonin 10 MG capsule, Take  by mouth Every Night., Disp: , Rfl:   •  methotrexate 2.5 MG tablet, Take  by mouth. 6 tablets weekly, Disp: , Rfl:   •  metoprolol tartrate (LOPRESSOR) 25 MG tablet, TAKE ONE TABLET BY MOUTH TWICE A DAY, Disp: 180 tablet, Rfl: 0  •  metoprolol tartrate (LOPRESSOR) 25 MG tablet, TAKE ONE-HALF TABLET BY MOUTH TWICE A DAY, Disp: 30 tablet, Rfl: 0  •  Multiple Vitamins-Minerals (DAILY MULTIVITAMIN) capsule, DAILY MULTIVITAMIN CAPS, Disp: , Rfl:   •  olopatadine (PATADAY) 0.2 % solution ophthalmic solution, Administer 1 drop to both eyes Daily., Disp: 1 bottle, Rfl: 3  •  oxyCODONE-acetaminophen (PERCOCET)  MG per tablet, Take 1 tablet by mouth Every 6 (Six) Hours As Needed for Moderate Pain ., Disp: 120 tablet, Rfl: 0  •  rosuvastatin (CRESTOR) 10 MG tablet, TAKE ONE TABLET BY MOUTH DAILY, Disp: 90 tablet, Rfl: 0  •  aspirin (aspirin) 81 MG EC tablet, Take 1 tablet by mouth Daily., Disp: , Rfl:   •  colchicine 0.6 MG tablet, , Disp: , Rfl:     Current Facility-Administered Medications:   •  Testosterone Cypionate (DEPOTESTOTERONE CYPIONATE) injection 200 mg, 200 mg, Intramuscular, Q14 Days, Abbi Rosales, MD, 200 mg at 06/22/20 0917    Objective   /80 (BP Location: Right arm, Patient  "Position: Sitting, Cuff Size: Adult)   Pulse 67   Temp 98.2 °F (36.8 °C) (Temporal)   Resp 16   Ht 182.9 cm (72\")   Wt 95.7 kg (211 lb)   BMI 28.62 kg/m²   Physical Exam   Constitutional: He is oriented to person, place, and time. He appears well-developed and well-nourished. No distress.   HENT:   Head: Normocephalic and atraumatic.   Right Ear: External ear normal.   Left Ear: External ear normal.   Mouth/Throat: Oropharynx is clear and moist. No oropharyngeal exudate.   Eyes: Conjunctivae and EOM are normal. Right eye exhibits no discharge. Left eye exhibits no discharge. No scleral icterus.   Neck: Normal range of motion. Neck supple. No thyromegaly present.   Cardiovascular: Normal rate, regular rhythm and normal heart sounds. Exam reveals no gallop and no friction rub.   No murmur heard.  Pulmonary/Chest: Effort normal and breath sounds normal. No respiratory distress. He has no wheezes. He has no rales.   Musculoskeletal: Normal range of motion. He exhibits no edema or deformity.   Lymphadenopathy:     He has no cervical adenopathy.   Neurological: He is alert and oriented to person, place, and time. No cranial nerve deficit.   Skin: Skin is warm and dry. No rash noted. He is not diaphoretic. No erythema.   Psychiatric: He has a normal mood and affect. His behavior is normal. Thought content normal.   Vitals reviewed.        Assessment/Plan   Problems Addressed this Visit        Cardiovascular and Mediastinum    Ischemic cardiomyopathy    Relevant Orders    Basic Metabolic Panel    proBNP (Completed)    Coronary artery disease involving native coronary artery of native heart without angina pectoris - Primary    Relevant Orders    Basic Metabolic Panel    proBNP (Completed)       Endocrine    Type 2 diabetes mellitus with hyperglycemia, without long-term current use of insulin (CMS/[facility])      Other Visit Diagnoses     Chronic systolic (congestive) heart failure (CMS/[facility])         Relevant Orders    proBNP " (Completed)    Hypogonadism male        Hyperlipidemia, unspecified hyperlipidemia type              Check labs  Pt is not currently on an ACEi and should be bc of heart issues + DM  Will start lisinopril 5mg daily - bp is elevated today  Will call in topical testosterone to [facility]  Since we have having trouble getting testosterone in office       Procedures

## 2020-07-20 ENCOUNTER — LAB (OUTPATIENT)
Dept: FAMILY MEDICINE CLINIC | Facility: CLINIC | Age: 70
End: 2020-07-20

## 2020-07-20 ENCOUNTER — TELEPHONE (OUTPATIENT)
Dept: FAMILY MEDICINE CLINIC | Facility: CLINIC | Age: 70
End: 2020-07-20

## 2020-07-20 DIAGNOSIS — E29.1 MALE HYPOGONADISM: ICD-10-CM

## 2020-07-20 DIAGNOSIS — I50.22 CHRONIC SYSTOLIC (CONGESTIVE) HEART FAILURE (HCC): ICD-10-CM

## 2020-07-20 DIAGNOSIS — Z12.5 ENCOUNTER FOR SCREENING FOR MALIGNANT NEOPLASM OF PROSTATE: ICD-10-CM

## 2020-07-20 DIAGNOSIS — I25.5 ISCHEMIC CARDIOMYOPATHY: ICD-10-CM

## 2020-07-20 DIAGNOSIS — I25.10 CORONARY ARTERY DISEASE INVOLVING NATIVE CORONARY ARTERY OF NATIVE HEART WITHOUT ANGINA PECTORIS: ICD-10-CM

## 2020-07-20 DIAGNOSIS — I10 ESSENTIAL HYPERTENSION: ICD-10-CM

## 2020-07-20 DIAGNOSIS — E11.65 TYPE 2 DIABETES MELLITUS WITH HYPERGLYCEMIA, WITHOUT LONG-TERM CURRENT USE OF INSULIN (HCC): ICD-10-CM

## 2020-07-20 DIAGNOSIS — E78.2 MIXED HYPERLIPIDEMIA: ICD-10-CM

## 2020-07-20 LAB
ALBUMIN SERPL-MCNC: 4.3 G/DL (ref 3.5–5.2)
ALBUMIN/GLOB SERPL: 1.4 G/DL
ALP SERPL-CCNC: 78 U/L (ref 39–117)
ALT SERPL W P-5'-P-CCNC: 46 U/L (ref 1–41)
ANION GAP SERPL CALCULATED.3IONS-SCNC: 10.6 MMOL/L (ref 5–15)
AST SERPL-CCNC: 32 U/L (ref 1–40)
BASOPHILS # BLD AUTO: 0.04 10*3/MM3 (ref 0–0.2)
BASOPHILS NFR BLD AUTO: 0.5 % (ref 0–1.5)
BILIRUB SERPL-MCNC: 0.4 MG/DL (ref 0–1.2)
BUN SERPL-MCNC: 24 MG/DL (ref 8–23)
BUN/CREAT SERPL: 21.1 (ref 7–25)
CALCIUM SPEC-SCNC: 9.4 MG/DL (ref 8.6–10.5)
CHLORIDE SERPL-SCNC: 97 MMOL/L (ref 98–107)
CHOLEST SERPL-MCNC: 179 MG/DL (ref 0–200)
CO2 SERPL-SCNC: 29.4 MMOL/L (ref 22–29)
CREAT SERPL-MCNC: 1.14 MG/DL (ref 0.76–1.27)
DEPRECATED RDW RBC AUTO: 52.7 FL (ref 37–54)
EOSINOPHIL # BLD AUTO: 0.28 10*3/MM3 (ref 0–0.4)
EOSINOPHIL NFR BLD AUTO: 3.6 % (ref 0.3–6.2)
ERYTHROCYTE [DISTWIDTH] IN BLOOD BY AUTOMATED COUNT: 15.2 % (ref 12.3–15.4)
GFR SERPL CREATININE-BSD FRML MDRD: 64 ML/MIN/1.73
GLOBULIN UR ELPH-MCNC: 3 GM/DL
GLUCOSE SERPL-MCNC: 147 MG/DL (ref 65–99)
HBA1C MFR BLD: 6.7 % (ref 3.5–5.6)
HCT VFR BLD AUTO: 46.6 % (ref 37.5–51)
HDLC SERPL-MCNC: 38 MG/DL (ref 40–60)
HGB BLD-MCNC: 16.2 G/DL (ref 13–17.7)
IMM GRANULOCYTES # BLD AUTO: 0.03 10*3/MM3 (ref 0–0.05)
IMM GRANULOCYTES NFR BLD AUTO: 0.4 % (ref 0–0.5)
LDLC SERPL CALC-MCNC: 90 MG/DL (ref 0–100)
LDLC/HDLC SERPL: 2.36 {RATIO}
LYMPHOCYTES # BLD AUTO: 1.23 10*3/MM3 (ref 0.7–3.1)
LYMPHOCYTES NFR BLD AUTO: 15.9 % (ref 19.6–45.3)
MCH RBC QN AUTO: 33.1 PG (ref 26.6–33)
MCHC RBC AUTO-ENTMCNC: 34.8 G/DL (ref 31.5–35.7)
MCV RBC AUTO: 95.1 FL (ref 79–97)
MONOCYTES # BLD AUTO: 0.64 10*3/MM3 (ref 0.1–0.9)
MONOCYTES NFR BLD AUTO: 8.3 % (ref 5–12)
NEUTROPHILS NFR BLD AUTO: 5.53 10*3/MM3 (ref 1.7–7)
NEUTROPHILS NFR BLD AUTO: 71.3 % (ref 42.7–76)
NRBC BLD AUTO-RTO: 0 /100 WBC (ref 0–0.2)
NT-PROBNP SERPL-MCNC: 237.9 PG/ML (ref 0–900)
PLATELET # BLD AUTO: 272 10*3/MM3 (ref 140–450)
PMV BLD AUTO: 10.4 FL (ref 6–12)
POTASSIUM SERPL-SCNC: 4.5 MMOL/L (ref 3.5–5.2)
PROT SERPL-MCNC: 7.3 G/DL (ref 6–8.5)
PSA SERPL-MCNC: 1.02 NG/ML (ref 0–4)
RBC # BLD AUTO: 4.9 10*6/MM3 (ref 4.14–5.8)
SODIUM SERPL-SCNC: 137 MMOL/L (ref 136–145)
TRIGL SERPL-MCNC: 257 MG/DL (ref 0–150)
TSH SERPL DL<=0.05 MIU/L-ACNC: 1.37 UIU/ML (ref 0.27–4.2)
VLDLC SERPL-MCNC: 51.4 MG/DL (ref 5–40)
WBC # BLD AUTO: 7.75 10*3/MM3 (ref 3.4–10.8)

## 2020-07-20 PROCEDURE — 83036 HEMOGLOBIN GLYCOSYLATED A1C: CPT | Performed by: INTERNAL MEDICINE

## 2020-07-20 PROCEDURE — 80061 LIPID PANEL: CPT | Performed by: INTERNAL MEDICINE

## 2020-07-20 PROCEDURE — 80053 COMPREHEN METABOLIC PANEL: CPT | Performed by: INTERNAL MEDICINE

## 2020-07-20 PROCEDURE — 85025 COMPLETE CBC W/AUTO DIFF WBC: CPT | Performed by: INTERNAL MEDICINE

## 2020-07-20 PROCEDURE — 84403 ASSAY OF TOTAL TESTOSTERONE: CPT | Performed by: INTERNAL MEDICINE

## 2020-07-20 PROCEDURE — G0103 PSA SCREENING: HCPCS | Performed by: INTERNAL MEDICINE

## 2020-07-20 PROCEDURE — 84443 ASSAY THYROID STIM HORMONE: CPT | Performed by: INTERNAL MEDICINE

## 2020-07-20 PROCEDURE — 83880 ASSAY OF NATRIURETIC PEPTIDE: CPT | Performed by: INTERNAL MEDICINE

## 2020-07-20 PROCEDURE — 36415 COLL VENOUS BLD VENIPUNCTURE: CPT

## 2020-07-20 PROCEDURE — 84402 ASSAY OF FREE TESTOSTERONE: CPT | Performed by: INTERNAL MEDICINE

## 2020-07-20 RX ORDER — LEVOTHYROXINE SODIUM 0.1 MG/1
TABLET ORAL
Qty: 90 TABLET | Refills: 0 | Status: SHIPPED | OUTPATIENT
Start: 2020-07-20 | End: 2020-10-14

## 2020-07-20 NOTE — TELEPHONE ENCOUNTER
I called to let patient know we are still out of testosterone here in office, he said that Doctor was going to call him in a topical gel to [facility] for testosterone and asked if that could be sent in today please.

## 2020-07-20 NOTE — TELEPHONE ENCOUNTER
Yes, I called the prescription in Friday to [facility] in [place].  It takes a few days to compound, and then they usually call him when it's ready.

## 2020-07-22 LAB
TESTOST FREE SERPL-MCNC: 1.9 PG/ML (ref 6.6–18.1)
TESTOST SERPL-MCNC: 49.8 NG/DL (ref 264–916)

## 2020-07-27 DIAGNOSIS — M51.36 DDD (DEGENERATIVE DISC DISEASE), LUMBAR: ICD-10-CM

## 2020-07-27 RX ORDER — OXYCODONE AND ACETAMINOPHEN 10; 325 MG/1; MG/1
1 TABLET ORAL EVERY 6 HOURS PRN
Qty: 120 TABLET | Refills: 0 | Status: SHIPPED | OUTPATIENT
Start: 2020-07-27 | End: 2020-10-02 | Stop reason: SDUPTHER

## 2020-07-27 NOTE — PROGRESS NOTES
Gave pt results. He wanted to ask you where he applies the testosterone cream. The label states in an area that is clothed, so he called the pharmacy and stated that you didn't indicate a specific location so he just put it on his thighs. Also pt requested a refill of his percocet sent to [facility].

## 2020-07-31 ENCOUNTER — TELEPHONE (OUTPATIENT)
Dept: FAMILY MEDICINE CLINIC | Facility: CLINIC | Age: 70
End: 2020-07-31

## 2020-07-31 NOTE — TELEPHONE ENCOUNTER
Spoke with patient at 2:33pm and told him that we do have testosterone in.  He already has an appt scheduled for Monday morning.

## 2020-07-31 NOTE — TELEPHONE ENCOUNTER
PATIENT CALLED REGARDING TESTERONE SHOT. HE WOULD LIKE TO MAKE SURE MEDICATION IS IN OFFICE BEFORE HE COMES IN ON Monday 08/03/20.  HE IS REQUESTING A CALL BACK, IF NO ANSWER PLEASE LEAVE MESSAGE.       PATIENT PHONE  [phone] (W)

## 2020-08-03 ENCOUNTER — CLINICAL SUPPORT (OUTPATIENT)
Dept: FAMILY MEDICINE CLINIC | Facility: CLINIC | Age: 70
End: 2020-08-03

## 2020-08-03 DIAGNOSIS — E29.1 DEFICIENCY OF TESTOSTERONE BIOSYNTHESIS: ICD-10-CM

## 2020-08-03 PROCEDURE — 96372 THER/PROPH/DIAG INJ SC/IM: CPT | Performed by: INTERNAL MEDICINE

## 2020-08-03 RX ADMIN — TESTOSTERONE CYPIONATE 200 MG: 200 INJECTION, SOLUTION INTRAMUSCULAR at 08:53

## 2020-08-17 ENCOUNTER — CLINICAL SUPPORT (OUTPATIENT)
Dept: FAMILY MEDICINE CLINIC | Facility: CLINIC | Age: 70
End: 2020-08-17

## 2020-08-17 DIAGNOSIS — E34.9 TESTOSTERONE DEFICIENCY: ICD-10-CM

## 2020-08-17 PROCEDURE — 96372 THER/PROPH/DIAG INJ SC/IM: CPT | Performed by: INTERNAL MEDICINE

## 2020-08-17 RX ADMIN — TESTOSTERONE CYPIONATE 200 MG: 200 INJECTION, SOLUTION INTRAMUSCULAR at 09:53

## 2020-08-28 ENCOUNTER — TELEPHONE (OUTPATIENT)
Dept: FAMILY MEDICINE CLINIC | Facility: CLINIC | Age: 70
End: 2020-08-28

## 2020-08-28 NOTE — TELEPHONE ENCOUNTER
PATIENT REQUESTED TO MAKE AN APPT TO GET CLEARANCE TO HAVE SURGERY.    BEST CALL BACK  [phone]

## 2020-08-28 NOTE — TELEPHONE ENCOUNTER
SPOKE WITH PATIENT, HE STATED THAT HES HAVING BACK SURGERY AND WILL BE SCHEDULED ONCE CLEARED BY CARDIO AND PCP, PT HAS APPT WITH CARDIO 9/10. hE SAID WE SHOULD HAVE RECEIVED PAPERWORK FROM DR MONTALVO FOR THIS. DO YOU NEED TO SEE PT OR JUST DO FORMS? PT LAST SEEN 7/17, LOOKING AT EARLY/MID SEPT FOR SURGERY DATE.

## 2020-08-31 ENCOUNTER — CLINICAL SUPPORT (OUTPATIENT)
Dept: FAMILY MEDICINE CLINIC | Facility: CLINIC | Age: 70
End: 2020-08-31

## 2020-08-31 DIAGNOSIS — E29.1 DEFICIENCY OF TESTOSTERONE BIOSYNTHESIS: ICD-10-CM

## 2020-08-31 PROCEDURE — 96372 THER/PROPH/DIAG INJ SC/IM: CPT | Performed by: INTERNAL MEDICINE

## 2020-08-31 RX ADMIN — TESTOSTERONE CYPIONATE 200 MG: 200 INJECTION, SOLUTION INTRAMUSCULAR at 08:40

## 2020-09-01 NOTE — TELEPHONE ENCOUNTER
Please write letter that pt has history of coronary artery disease s/p 5 vessel CABG, ischemic cardiomyopathy with EF 40%, hypothyroidism, IVANNA, but at the moment, benefits outweigh risks of surgery and thus medically cleared for surgery.

## 2020-09-01 NOTE — TELEPHONE ENCOUNTER
Pt will contact his surgeon's office at [facility] and find out if there is specific paperwork or if he can just write a Surgical Clearance letter.

## 2020-09-01 NOTE — TELEPHONE ENCOUNTER
Patient called in and stated his surgical center needs a letter stated patient is cleared to have surgery .     Please fax letter to [phone] attention dr que turner , [facility]         Patient call back [phone]

## 2020-09-10 ENCOUNTER — OFFICE VISIT (OUTPATIENT)
Dept: CARDIOLOGY | Facility: CLINIC | Age: 70
End: 2020-09-10

## 2020-09-10 VITALS
SYSTOLIC BLOOD PRESSURE: 120 MMHG | HEIGHT: 72 IN | DIASTOLIC BLOOD PRESSURE: 68 MMHG | HEART RATE: 66 BPM | BODY MASS INDEX: 28.85 KG/M2 | WEIGHT: 213 LBS | OXYGEN SATURATION: 98 %

## 2020-09-10 DIAGNOSIS — I25.10 CORONARY ARTERY DISEASE INVOLVING NATIVE CORONARY ARTERY OF NATIVE HEART WITHOUT ANGINA PECTORIS: ICD-10-CM

## 2020-09-10 DIAGNOSIS — I10 ESSENTIAL HYPERTENSION: ICD-10-CM

## 2020-09-10 DIAGNOSIS — I25.5 ISCHEMIC CARDIOMYOPATHY: Primary | ICD-10-CM

## 2020-09-10 DIAGNOSIS — Z01.818 PRE-OP EXAMINATION: ICD-10-CM

## 2020-09-10 PROBLEM — R29.898 WEAKNESS OF BOTH LEGS: Status: ACTIVE | Noted: 2020-08-18

## 2020-09-10 PROBLEM — M96.1 LUMBAR POST-LAMINECTOMY SYNDROME: Status: ACTIVE | Noted: 2020-01-21

## 2020-09-10 PROBLEM — T84.498A FAILED ORTHOPEDIC IMPLANT: Status: ACTIVE | Noted: 2019-09-06

## 2020-09-10 PROBLEM — M96.0 PSEUDARTHROSIS FOLLOWING SPINAL FUSION: Status: ACTIVE | Noted: 2019-09-30

## 2020-09-10 PROBLEM — M54.6 THORACIC BACK PAIN: Status: ACTIVE | Noted: 2020-01-21

## 2020-09-10 PROBLEM — M79.18 MYOFASCIAL PAIN: Status: ACTIVE | Noted: 2020-01-21

## 2020-09-10 PROCEDURE — 93000 ELECTROCARDIOGRAM COMPLETE: CPT | Performed by: INTERNAL MEDICINE

## 2020-09-10 PROCEDURE — 99213 OFFICE O/P EST LOW 20 MIN: CPT | Performed by: INTERNAL MEDICINE

## 2020-09-10 RX ORDER — PANTOPRAZOLE SODIUM 40 MG/1
40 TABLET, DELAYED RELEASE ORAL DAILY
COMMUNITY
End: 2020-12-22

## 2020-09-10 NOTE — PROGRESS NOTES
Cardiology Office Visit Note      Referring physician:  Abbi Rosales MD    Reason For Followup: pre-op    HPI:  Arik August is a 70 y.o. male. Presents today for a pre-op clearance. Patient has known hx HTN, DM 2, PE, hyperlipidemia, PND,  and IVANNA.    He has known advanced CAD with prior multivessel CABG in 2018, but has not required subsequent intervention reported to a recent unstable angina pectoris symptoms, clinical evidence of acute microinfarction or clinical CHF symptoms noted prohibit elective neurosurgery at this time.    He unfortunately is quite functionally limited by his severe chronic back pain for which he now seeks re-spine surgery.    The patient returns today for follow up with no specific cardiac complaints.  He denies chest pain, dyspnea, PND, orthopnea, palpitations, near syncope, or feelings of her heart racing.  He reports he has been compliant with prescribed medications.     Past Medical History:   Diagnosis Date   • 3-vessel CAD 12/24/2018    Severe Distal Main Noted on Cardiac Cath w/High-Grade Obstruction Involving the LAD; S/p CABG 12/2018   • Arthritis    • Atelectasis 12/12/2018    Noted on CT Chest   • Carpal tunnel syndrome     S/p Bret Release   • Chest pain due to CAD (CMS/HCC) 12/2018   • CHF (congestive heart failure) (CMS/HCC) 2018   • Chronic back pain     x2 Lumbar Fusions   • Chronic neck pain     Hx Cervical Fusion   • DDD (degenerative disc disease), lumbar    • Dyspnea on exertion 12/2018   • Emphysema of lung (CMS/[facility]) 12/12/2018    Noted on CT Chest   • Encounter for general adult medical examination without abnormal findings 10/1/2013   • HFrEF (heart failure with reduced ejection fraction) (CMS/HCC) 2/7/2019   • History of echocardiogram 12/21/18-[facility]    EF 40%; Mild L Atrial Enlargement Measuring 4.3CM; Normal Root/No Effusion; Ischemic Cardiomyopathy Changes Noted; Mild MVR Noted   • History of EKG 12/20/18-[facility]    Sinus Tachycardia Noted w/Abnormal Changes    • HLD (hyperlipidemia)    • Hypertension    • Hypothyroidism    • Iron deficiency anemia 12/26/2018-PO    w/Iron Transfusion   • Ischemic cardiomyopathy 12/21/2018    Noted on Echo   • LAD stenosis 12/24/2018    L Circumfrex @ 90% Narrowing w/Severe Blockage of Obtuse Marginal Branch and 99% Narrowing of Ostal Ramus INtermedius Branch   • Lung nodule 12/21/2018    Small Stable in R Noted on CT Chest   • Mild atrial enlargement, left 12/21/2018    Noted on Echo   • Mild mitral valve regurgitation 12/21/2018    Noted on Echo   • Orthopnea 12/2018   • IVANNA (obstructive sleep apnea)    • Osteoarthritis     Severe All Over; Hx Several Surgeries; Takes Oxycodone Daily   • Pleural effusion, bilateral 12/12/18-Moderate & 12/21/18-Small    Noted on CT Chest   • Pulmonary edema 12/2018   • RCA occlusion (CMS/HCC) 12/24/2018    Total Occlusion Noted on Cardiac Cath   • Sinus tachycardia 12/20/18-[facility]    Noted on EKG   • SOB (shortness of breath) 12/2018       Past Surgical History:   Procedure Laterality Date   • ANKLE SURGERY Right    • CARDIAC CATHETERIZATION Left 12/24/18-[facility]    w/R Coronary Arteriography/Coronary Angiography--Dr. Gardiner--Severe Distal L Main Disease with 3V CAD w/High-Grade Obstruction Involving the LAD L Cirumflex Ramus   • CARPAL TUNNEL RELEASE Bilateral     x2   • CERVICAL FUSION      x1   • COLONOSCOPY  2001    WNL   • CORONARY ARTERY BYPASS GRAFT  12/26/18-F    Urgent x5 w/L Vein Grafting-Dr. Aragon   • ELBOW ARTHROSCOPY Right    • ENDOSCOPIC VEIN HARVEST  12/26/18-F    R Lower Extremity-Dr. Aragon   • EXTERNAL EAR SURGERY Left    • KNEE ARTHROSCOPY Right     x3   • KNEE SURGERY Left    • LUMBAR FUSION      x2   • NASAL FRACTURE SURGERY     • OTHER SURGICAL HISTORY      R Forearm Surgery   • REPLACEMENT TOTAL KNEE Bilateral    • SHOULDER SURGERY Left    • SPINE SURGERY  09/2018         Current Outpatient Medications:   •  allopurinol (ZYLOPRIM) 300 MG tablet, TAKE ONE TABLET BY MOUTH DAILY, Disp: 90  tablet, Rfl: 0  •  aspirin (aspirin) 81 MG EC tablet, Take 1 tablet by mouth Daily., Disp: , Rfl:   •  baclofen (LIORESAL) 10 MG tablet, Take 10 mg by mouth As Needed., Disp: , Rfl:   •  bumetanide (BUMEX) 2 MG tablet, TAKE ONE TABLET BY MOUTH DAILY, Disp: 90 tablet, Rfl: 0  •  cycloSPORINE (RESTASIS) 0.05 % ophthalmic emulsion, RESTASIS 0.05 % EMUL, Disp: , Rfl:   •  diclofenac (VOLTAREN) 75 MG EC tablet, TAKE ONE TABLET BY MOUTH TWICE A DAY AS NEEDED, Disp: 180 tablet, Rfl: 1  •  ferrous sulfate 325 (65 FE) MG tablet, TAKE ONE TABLET BY MOUTH DAILY, Disp: 90 tablet, Rfl: 0  •  folic acid (FOLVITE) 1 MG tablet, 2 tablets daily, Disp: , Rfl:   •  KLOR-CON 20 MEQ CR tablet, TAKE ONE TABLET BY MOUTH TWICE A DAY, Disp: 60 tablet, Rfl: 0  •  levothyroxine (SYNTHROID, LEVOTHROID) 100 MCG tablet, TAKE ONE TABLET BY MOUTH DAILY, Disp: 90 tablet, Rfl: 0  •  lisinopril (PRINIVIL,ZESTRIL) 5 MG tablet, Take 1 tablet by mouth Daily., Disp: 90 tablet, Rfl: 0  •  magnesium oxide (MAGOX) 400 (241.3 Mg) MG tablet tablet, TAKE ONE TABLET BY MOUTH DAILY, Disp: 90 tablet, Rfl: 0  •  Melatonin 10 MG capsule, Take  by mouth Every Night., Disp: , Rfl:   •  methotrexate 2.5 MG tablet, Take  by mouth. 6 tablets weekly, Disp: , Rfl:   •  metoprolol tartrate (LOPRESSOR) 25 MG tablet, TAKE ONE TABLET BY MOUTH TWICE A DAY, Disp: 180 tablet, Rfl: 0  •  Multiple Vitamins-Minerals (DAILY MULTIVITAMIN) capsule, DAILY MULTIVITAMIN CAPS, Disp: , Rfl:   •  oxyCODONE-acetaminophen (PERCOCET)  MG per tablet, Take 1 tablet by mouth Every 6 (Six) Hours As Needed for Moderate Pain ., Disp: 120 tablet, Rfl: 0  •  pantoprazole (PROTONIX) 40 MG EC tablet, Take 40 mg by mouth Daily., Disp: , Rfl:   •  rosuvastatin (CRESTOR) 10 MG tablet, TAKE ONE TABLET BY MOUTH DAILY, Disp: 90 tablet, Rfl: 0    Current Facility-Administered Medications:   •  Testosterone Cypionate (DEPOTESTOTERONE CYPIONATE) injection 200 mg, 200 mg, Intramuscular, Q14 Days, Connie  "Abbi Mccray MD, 200 mg at 09/14/20 0904    Social History     Socioeconomic History   • Marital status:      Spouse name: Melissa   • Number of children: Not on file   • Years of education: Not on file   • Highest education level: Not on file   Occupational History   • Occupation: RETIRED   Tobacco Use   • Smoking status: Former Smoker     Types: Cigarettes   • Smokeless tobacco: Never Used   Substance and Sexual Activity   • Alcohol use: Yes     Comment: Occ Beer   • Drug use: No   • Sexual activity: Defer       History reviewed. No pertinent family history.      Review of Systems   General: denies fever, chills, anorexia, weight loss  Eyes: denies blurring, diplopia  Ear/Nose/Throat: denies ear pain, nosebleeds, hoarseness  Cardiovascular: See HPI  Respiratory: denies excessive sputum, hemoptysis, wheezing  Gastrointestinal: denies nausea, vomiting, change in bowel habits, abdominal pain  Genitourinary: Occasional nocturia; no significant hematuria or dysuria  Musculoskeletal: Severe back pain, chronic-see HPI  Skin: denies rashes, itching, suspicious lesions  Neurologic: denies focal neuro deficits  Psychiatric: denies depression, anxiety  Endocrine: denies cold intolerance, heat intolerance  Hematologic/Lymphatic: denies abnormal bruising, bleeding  Allergic/Immunologic: denies urticaria or persistent infections      Objective     Visit Vitals  /68   Pulse 66   Ht 182.9 cm (72.01\")   Wt 96.6 kg (213 lb)   SpO2 98%   BMI 28.88 kg/m²           Physical Exam  General:     Obese, well developed,, in no acute distress.    Head:     normocephalic and atraumatic.    Eyes:    PERRL/EOM intact, conjunctiva and sclera clear with out nystagmus.    Neck:    no jvd or bruits  Chest Wall:    no deformities   Lungs:    clear bilaterally to auscultation with adequate global airflow   Heart:    non-displaced PMI; regular rate and rhythm, normal S1, S2 without murmurs, rubs, or gallops  Abdomen:  Soft, nontender " without HSM  Msk:    no deformity; marginally adequate R OM, with antalgic gait  Pulses:    pulses normal in all 4 extremities.    Extremities:    no clubbing, cyanosis, edema   Neurologic:    no focal sensory or motor deficits  Skin:    intact without lesions or rashes.    Psych:    alert and cooperative; normal mood and affect; normal attention span and concentration.            ECG 12 Lead    Date/Time: 9/10/2020 12:33 PM  Performed by: HILARIO Gardiner MD  Authorized by: HILARIO Gardiner MD   Comparison: compared with previous ECG from 9/17/2019  Similar to previous ECG  Rhythm: sinus rhythm  Conduction: non-specific intraventricular conduction delay  Q waves: II, III and aVF    Other findings: left ventricular hypertrophy with strain    Clinical impression: abnormal EKG  Comments: No overall change from previous tracing              Assessment:   Problems Addressed this Visit        Cardiovascular and Mediastinum    Ischemic cardiomyopathy - Primary  -Clinically stable well compensated            Coronary artery disease involving native coronary artery of native heart without angina pectoris    -Remains hemodynamically stable and angina free  -Status post CABG 2018        Essential hypertension    =-Remains well-regulated on her medication listed reviewed detail.           Other    Pre-op examination    Gnosis patient has no evidence of unstable angina pectoris, acute microinfarction or congestive heart failure symptoms of prohibit elective neurosurgery at this time.  -Cleared from cardiac standpoint to proceed with elective surgery                Plan:  Cleared from cardiac standpoint to proceed with elective neurosurgery.  Continue current medications listed reviewed in detail today.  Return to clinic for follow-up in 6 months or sooner if needed.    HILARIO Gardiner MD  9/20/2020 23:51 EDT    This report was generated using the Dragon voice recognition system.

## 2020-09-14 ENCOUNTER — CLINICAL SUPPORT (OUTPATIENT)
Dept: FAMILY MEDICINE CLINIC | Facility: CLINIC | Age: 70
End: 2020-09-14

## 2020-09-14 DIAGNOSIS — E29.1 DEFICIENCY OF TESTOSTERONE BIOSYNTHESIS: ICD-10-CM

## 2020-09-14 PROCEDURE — 96372 THER/PROPH/DIAG INJ SC/IM: CPT | Performed by: INTERNAL MEDICINE

## 2020-09-14 RX ADMIN — TESTOSTERONE CYPIONATE 200 MG: 200 INJECTION, SOLUTION INTRAMUSCULAR at 09:04

## 2020-09-21 RX ORDER — POTASSIUM CHLORIDE 20 MEQ/1
TABLET, EXTENDED RELEASE ORAL
Qty: 60 TABLET | Refills: 0 | Status: SHIPPED | OUTPATIENT
Start: 2020-09-21 | End: 2020-10-22

## 2020-09-28 ENCOUNTER — CLINICAL SUPPORT (OUTPATIENT)
Dept: FAMILY MEDICINE CLINIC | Facility: CLINIC | Age: 70
End: 2020-09-28

## 2020-09-28 DIAGNOSIS — E29.1 TESTOSTERONE DEFICIENCY IN MALE: ICD-10-CM

## 2020-09-28 PROCEDURE — 96372 THER/PROPH/DIAG INJ SC/IM: CPT | Performed by: INTERNAL MEDICINE

## 2020-09-28 RX ADMIN — TESTOSTERONE CYPIONATE 200 MG: 200 INJECTION, SOLUTION INTRAMUSCULAR at 09:16

## 2020-10-02 DIAGNOSIS — M51.36 DDD (DEGENERATIVE DISC DISEASE), LUMBAR: ICD-10-CM

## 2020-10-02 RX ORDER — OXYCODONE AND ACETAMINOPHEN 10; 325 MG/1; MG/1
1 TABLET ORAL EVERY 6 HOURS PRN
Qty: 120 TABLET | Refills: 0 | Status: SHIPPED | OUTPATIENT
Start: 2020-10-02 | End: 2020-12-01 | Stop reason: SDUPTHER

## 2020-10-02 RX ORDER — ROSUVASTATIN CALCIUM 10 MG/1
TABLET, COATED ORAL
Qty: 90 TABLET | Refills: 0 | Status: SHIPPED | OUTPATIENT
Start: 2020-10-02 | End: 2020-12-08 | Stop reason: SDUPTHER

## 2020-10-02 RX ORDER — ALLOPURINOL 300 MG/1
TABLET ORAL
Qty: 90 TABLET | Refills: 0 | Status: SHIPPED | OUTPATIENT
Start: 2020-10-02 | End: 2020-12-30

## 2020-10-02 NOTE — TELEPHONE ENCOUNTER
PATIENT REQUESTED A REFILL ON :oxyCODONE-acetaminophen (PERCOCET)  MG per tablet    PATIENT CAN BE REACHED AT:[phone]    PHARMACY PREFERRED:[facility] - [place], [place] - [address] [place] - [phone]  - [phone] FX

## 2020-10-05 RX ORDER — BUMETANIDE 2 MG/1
TABLET ORAL
Qty: 180 TABLET | Refills: 0 | Status: SHIPPED | OUTPATIENT
Start: 2020-10-05 | End: 2021-01-07

## 2020-10-12 ENCOUNTER — CLINICAL SUPPORT (OUTPATIENT)
Dept: FAMILY MEDICINE CLINIC | Facility: CLINIC | Age: 70
End: 2020-10-12

## 2020-10-12 DIAGNOSIS — E34.9 TESTOSTERONE DEFICIENCY: ICD-10-CM

## 2020-10-12 PROCEDURE — 96372 THER/PROPH/DIAG INJ SC/IM: CPT | Performed by: INTERNAL MEDICINE

## 2020-10-12 RX ADMIN — TESTOSTERONE CYPIONATE 200 MG: 200 INJECTION, SOLUTION INTRAMUSCULAR at 09:51

## 2020-10-14 RX ORDER — LEVOTHYROXINE SODIUM 0.1 MG/1
TABLET ORAL
Qty: 90 TABLET | Refills: 0 | Status: SHIPPED | OUTPATIENT
Start: 2020-10-14 | End: 2021-01-12

## 2020-10-14 RX ORDER — LISINOPRIL 5 MG/1
TABLET ORAL
Qty: 90 TABLET | Refills: 0 | Status: SHIPPED | OUTPATIENT
Start: 2020-10-14 | End: 2021-01-12

## 2020-10-22 RX ORDER — POTASSIUM CHLORIDE 1500 MG/1
TABLET, EXTENDED RELEASE ORAL
Qty: 60 TABLET | Refills: 0 | Status: SHIPPED | OUTPATIENT
Start: 2020-10-22 | End: 2020-11-26

## 2020-10-28 ENCOUNTER — TELEPHONE (OUTPATIENT)
Dept: FAMILY MEDICINE CLINIC | Facility: CLINIC | Age: 70
End: 2020-10-28

## 2020-10-28 NOTE — TELEPHONE ENCOUNTER
SEDA, A PHYSICAL THERAPIST, CALLED FROM [facility] REQUESTING VERBAL ORDERS FOR PHYSICAL THERAPY FOR NINE WEEKS, TWICE A WEEK THE FIRST THREE WEEKS AND ONCE A WEEK THE LAST SIX WEEKS. SHE DID HIS EVALUATION TODAY.    PT ALSO REPORTED FREQUENT URGE TO URINATE WITH ONLY SMALL AMOUNTS VOIDED. PT HAS A NURSE THROUGH [facility], AND THEY ARE REQUESTING ORDERS FOR A URINE SAMPLE TO TEST FOR A UTI.    CALLBACK NUMBER: [phone]

## 2020-11-26 RX ORDER — POTASSIUM CHLORIDE 1500 MG/1
TABLET, EXTENDED RELEASE ORAL
Qty: 60 TABLET | Refills: 0 | Status: SHIPPED | OUTPATIENT
Start: 2020-11-26 | End: 2020-12-30

## 2020-12-01 ENCOUNTER — OFFICE VISIT (OUTPATIENT)
Dept: FAMILY MEDICINE CLINIC | Facility: CLINIC | Age: 70
End: 2020-12-01

## 2020-12-01 ENCOUNTER — LAB (OUTPATIENT)
Dept: FAMILY MEDICINE CLINIC | Facility: CLINIC | Age: 70
End: 2020-12-01

## 2020-12-01 VITALS
TEMPERATURE: 97.1 F | SYSTOLIC BLOOD PRESSURE: 130 MMHG | RESPIRATION RATE: 16 BRPM | WEIGHT: 207.4 LBS | BODY MASS INDEX: 28.09 KG/M2 | DIASTOLIC BLOOD PRESSURE: 70 MMHG | OXYGEN SATURATION: 96 % | HEIGHT: 72 IN | HEART RATE: 68 BPM

## 2020-12-01 DIAGNOSIS — E29.1 TESTOSTERONE DEFICIENCY IN MALE: ICD-10-CM

## 2020-12-01 DIAGNOSIS — R39.14 BENIGN PROSTATIC HYPERPLASIA WITH INCOMPLETE BLADDER EMPTYING: ICD-10-CM

## 2020-12-01 DIAGNOSIS — E78.2 MIXED HYPERLIPIDEMIA: ICD-10-CM

## 2020-12-01 DIAGNOSIS — I10 ESSENTIAL HYPERTENSION: ICD-10-CM

## 2020-12-01 DIAGNOSIS — N40.1 BENIGN PROSTATIC HYPERPLASIA WITH INCOMPLETE BLADDER EMPTYING: ICD-10-CM

## 2020-12-01 DIAGNOSIS — M51.36 DDD (DEGENERATIVE DISC DISEASE), LUMBAR: Primary | ICD-10-CM

## 2020-12-01 DIAGNOSIS — E11.65 TYPE 2 DIABETES MELLITUS WITH HYPERGLYCEMIA, WITHOUT LONG-TERM CURRENT USE OF INSULIN (HCC): ICD-10-CM

## 2020-12-01 LAB
ALBUMIN SERPL-MCNC: 4.5 G/DL (ref 3.5–5.2)
ALBUMIN/GLOB SERPL: 1.2 G/DL
ALP SERPL-CCNC: 112 U/L (ref 39–117)
ALT SERPL W P-5'-P-CCNC: 48 U/L (ref 1–41)
ANION GAP SERPL CALCULATED.3IONS-SCNC: 12.5 MMOL/L (ref 5–15)
AST SERPL-CCNC: 39 U/L (ref 1–40)
BASOPHILS # BLD AUTO: 0.1 10*3/MM3 (ref 0–0.2)
BASOPHILS NFR BLD AUTO: 1.1 % (ref 0–1.5)
BILIRUB SERPL-MCNC: 0.2 MG/DL (ref 0–1.2)
BUN SERPL-MCNC: 13 MG/DL (ref 8–23)
BUN/CREAT SERPL: 15.1 (ref 7–25)
CALCIUM SPEC-SCNC: 9.9 MG/DL (ref 8.6–10.5)
CHLORIDE SERPL-SCNC: 95 MMOL/L (ref 98–107)
CHOLEST SERPL-MCNC: 157 MG/DL (ref 0–200)
CO2 SERPL-SCNC: 30.5 MMOL/L (ref 22–29)
CREAT SERPL-MCNC: 0.86 MG/DL (ref 0.76–1.27)
DEPRECATED RDW RBC AUTO: 48.2 FL (ref 37–54)
EOSINOPHIL # BLD AUTO: 1.08 10*3/MM3 (ref 0–0.4)
EOSINOPHIL NFR BLD AUTO: 11.7 % (ref 0.3–6.2)
ERYTHROCYTE [DISTWIDTH] IN BLOOD BY AUTOMATED COUNT: 13.8 % (ref 12.3–15.4)
GFR SERPL CREATININE-BSD FRML MDRD: 88 ML/MIN/1.73
GLOBULIN UR ELPH-MCNC: 3.7 GM/DL
GLUCOSE SERPL-MCNC: 101 MG/DL (ref 65–99)
HBA1C MFR BLD: 6.2 % (ref 3.5–5.6)
HCT VFR BLD AUTO: 42.6 % (ref 37.5–51)
HDLC SERPL-MCNC: 39 MG/DL (ref 40–60)
HGB BLD-MCNC: 14.3 G/DL (ref 13–17.7)
IMM GRANULOCYTES # BLD AUTO: 0.02 10*3/MM3 (ref 0–0.05)
IMM GRANULOCYTES NFR BLD AUTO: 0.2 % (ref 0–0.5)
LDLC SERPL CALC-MCNC: 93 MG/DL (ref 0–100)
LDLC/HDLC SERPL: 2.31 {RATIO}
LYMPHOCYTES # BLD AUTO: 1.42 10*3/MM3 (ref 0.7–3.1)
LYMPHOCYTES NFR BLD AUTO: 15.4 % (ref 19.6–45.3)
MCH RBC QN AUTO: 32.4 PG (ref 26.6–33)
MCHC RBC AUTO-ENTMCNC: 33.6 G/DL (ref 31.5–35.7)
MCV RBC AUTO: 96.4 FL (ref 79–97)
MONOCYTES # BLD AUTO: 0.73 10*3/MM3 (ref 0.1–0.9)
MONOCYTES NFR BLD AUTO: 7.9 % (ref 5–12)
NEUTROPHILS NFR BLD AUTO: 5.86 10*3/MM3 (ref 1.7–7)
NEUTROPHILS NFR BLD AUTO: 63.7 % (ref 42.7–76)
NRBC BLD AUTO-RTO: 0 /100 WBC (ref 0–0.2)
PLATELET # BLD AUTO: 369 10*3/MM3 (ref 140–450)
PMV BLD AUTO: 9.7 FL (ref 6–12)
POTASSIUM SERPL-SCNC: 4.8 MMOL/L (ref 3.5–5.2)
PROT SERPL-MCNC: 8.2 G/DL (ref 6–8.5)
PSA SERPL-MCNC: 1.16 NG/ML (ref 0–4)
RBC # BLD AUTO: 4.42 10*6/MM3 (ref 4.14–5.8)
SODIUM SERPL-SCNC: 138 MMOL/L (ref 136–145)
TRIGL SERPL-MCNC: 139 MG/DL (ref 0–150)
TSH SERPL DL<=0.05 MIU/L-ACNC: 1.47 UIU/ML (ref 0.27–4.2)
VLDLC SERPL-MCNC: 25 MG/DL (ref 5–40)
WBC # BLD AUTO: 9.21 10*3/MM3 (ref 3.4–10.8)

## 2020-12-01 PROCEDURE — 80053 COMPREHEN METABOLIC PANEL: CPT | Performed by: INTERNAL MEDICINE

## 2020-12-01 PROCEDURE — 96372 THER/PROPH/DIAG INJ SC/IM: CPT | Performed by: INTERNAL MEDICINE

## 2020-12-01 PROCEDURE — 84153 ASSAY OF PSA TOTAL: CPT | Performed by: INTERNAL MEDICINE

## 2020-12-01 PROCEDURE — 99214 OFFICE O/P EST MOD 30 MIN: CPT | Performed by: INTERNAL MEDICINE

## 2020-12-01 PROCEDURE — 83036 HEMOGLOBIN GLYCOSYLATED A1C: CPT | Performed by: INTERNAL MEDICINE

## 2020-12-01 PROCEDURE — 84443 ASSAY THYROID STIM HORMONE: CPT | Performed by: INTERNAL MEDICINE

## 2020-12-01 PROCEDURE — 84402 ASSAY OF FREE TESTOSTERONE: CPT | Performed by: INTERNAL MEDICINE

## 2020-12-01 PROCEDURE — 36415 COLL VENOUS BLD VENIPUNCTURE: CPT

## 2020-12-01 PROCEDURE — 84403 ASSAY OF TOTAL TESTOSTERONE: CPT | Performed by: INTERNAL MEDICINE

## 2020-12-01 PROCEDURE — 80061 LIPID PANEL: CPT | Performed by: INTERNAL MEDICINE

## 2020-12-01 PROCEDURE — 85025 COMPLETE CBC W/AUTO DIFF WBC: CPT | Performed by: INTERNAL MEDICINE

## 2020-12-01 RX ORDER — TADALAFIL 20 MG/1
20 TABLET ORAL DAILY PRN
Qty: 30 TABLET | Refills: 1 | Status: SHIPPED | OUTPATIENT
Start: 2020-12-01 | End: 2022-09-15 | Stop reason: SDUPTHER

## 2020-12-01 RX ORDER — BACLOFEN 10 MG/1
10 TABLET ORAL 3 TIMES DAILY PRN
Qty: 270 TABLET | Refills: 1 | Status: SHIPPED | OUTPATIENT
Start: 2020-12-01 | End: 2021-11-22

## 2020-12-01 RX ORDER — OFLOXACIN 3 MG/ML
1 SOLUTION/ DROPS OPHTHALMIC 4 TIMES DAILY
COMMUNITY

## 2020-12-01 RX ORDER — TAMSULOSIN HYDROCHLORIDE 0.4 MG/1
0.4 CAPSULE ORAL DAILY
Qty: 90 CAPSULE | Refills: 0 | Status: SHIPPED | OUTPATIENT
Start: 2020-12-01 | End: 2021-02-26 | Stop reason: SDUPTHER

## 2020-12-01 RX ORDER — GUAIFENESIN 600 MG/1
1200 TABLET, EXTENDED RELEASE ORAL 2 TIMES DAILY
Qty: 120 TABLET | Refills: 1 | Status: SHIPPED | OUTPATIENT
Start: 2020-12-01 | End: 2021-06-10

## 2020-12-01 RX ORDER — UBIDECARENONE 75 MG
50 CAPSULE ORAL DAILY
COMMUNITY

## 2020-12-01 RX ORDER — TAMSULOSIN HYDROCHLORIDE 0.4 MG/1
0.4 CAPSULE ORAL DAILY
COMMUNITY
Start: 2020-10-25 | End: 2020-12-01 | Stop reason: SDUPTHER

## 2020-12-01 RX ORDER — OXYCODONE AND ACETAMINOPHEN 10; 325 MG/1; MG/1
1 TABLET ORAL EVERY 6 HOURS PRN
Qty: 120 TABLET | Refills: 0 | Status: SHIPPED | OUTPATIENT
Start: 2020-12-01 | End: 2021-01-26 | Stop reason: SDUPTHER

## 2020-12-01 RX ORDER — COLCHICINE 0.6 MG/1
1 TABLET ORAL DAILY
COMMUNITY
Start: 2020-09-16 | End: 2022-03-14

## 2020-12-01 RX ORDER — BENZONATATE 200 MG/1
200 CAPSULE ORAL 3 TIMES DAILY PRN
Qty: 30 CAPSULE | Refills: 2 | Status: SHIPPED | OUTPATIENT
Start: 2020-12-01 | End: 2021-03-05

## 2020-12-01 RX ADMIN — TESTOSTERONE CYPIONATE 200 MG: 200 INJECTION, SOLUTION INTRAMUSCULAR at 14:19

## 2020-12-01 NOTE — PROGRESS NOTES
Subjective   Arik August is a 70 y.o. male.     Pt is here for med check htn, dm, hpld, CAD, cervical and lumbar DDD, hypogonadism  He is due for labs  Topical testosterone worked decently well  But he felt better when he was on shots  He also needs a refill of a few of his medications    Does feel much better now compared to before surgery  Recovery was a bit more rough this time around since he had less inpt physical therapy    No chest pain, SOA  No urinary frequency, urgency    Though he does have dm, has been able to keep a1c at goal  With diet and exercise       The following portions of the patient's history were reviewed and updated as appropriate: allergies, current medications, past family history, past medical history, past social history, past surgical history and problem list.    Review of Systems   Constitutional: Negative for fatigue and fever.   HENT: Negative for congestion, ear pain, rhinorrhea and sore throat.    Eyes: Negative for blurred vision and itching.   Respiratory: Negative for cough and shortness of breath.    Cardiovascular: Negative for chest pain and palpitations.   Gastrointestinal: Negative for abdominal pain, diarrhea and vomiting.   Endocrine: Negative for polydipsia and polyuria.   Genitourinary: Negative for dysuria, frequency, hematuria and urgency.   Musculoskeletal: Positive for back pain and neck pain. Negative for joint swelling and myalgias.   Skin: Negative for rash and skin lesions.   Neurological: Negative for dizziness, numbness and headache.   Psychiatric/Behavioral: Negative for depressed mood. The patient is not nervous/anxious.          Current Outpatient Medications:   •  allopurinol (ZYLOPRIM) 300 MG tablet, TAKE ONE TABLET BY MOUTH DAILY, Disp: 90 tablet, Rfl: 0  •  aspirin (aspirin) 81 MG EC tablet, Take 1 tablet by mouth Daily., Disp: , Rfl:   •  baclofen (LIORESAL) 10 MG tablet, Take 10 mg by mouth As Needed., Disp: , Rfl:   •  bumetanide (BUMEX) 2 MG  tablet, TAKE ONE TABLET BY MOUTH TWICE A DAY AS NEEDED, Disp: 180 tablet, Rfl: 0  •  colchicine 0.6 MG tablet, Take 1 tablet by mouth Daily., Disp: , Rfl:   •  cycloSPORINE (RESTASIS) 0.05 % ophthalmic emulsion, RESTASIS 0.05 % EMUL, Disp: , Rfl:   •  diclofenac (VOLTAREN) 75 MG EC tablet, TAKE ONE TABLET BY MOUTH TWICE A DAY AS NEEDED, Disp: 180 tablet, Rfl: 1  •  ferrous sulfate 325 (65 FE) MG tablet, TAKE ONE TABLET BY MOUTH DAILY, Disp: 90 tablet, Rfl: 0  •  folic acid (FOLVITE) 1 MG tablet, 2 tablets daily, Disp: , Rfl:   •  KLOR-CON 20 MEQ CR tablet, TAKE ONE TABLET BY MOUTH TWICE A DAY, Disp: 60 tablet, Rfl: 0  •  levothyroxine (SYNTHROID, LEVOTHROID) 100 MCG tablet, TAKE ONE TABLET BY MOUTH DAILY, Disp: 90 tablet, Rfl: 0  •  lisinopril (PRINIVIL,ZESTRIL) 5 MG tablet, TAKE ONE TABLET BY MOUTH DAILY, Disp: 90 tablet, Rfl: 0  •  magnesium oxide (MAGOX) 400 (241.3 Mg) MG tablet tablet, TAKE ONE TABLET BY MOUTH DAILY, Disp: 90 tablet, Rfl: 0  •  Melatonin 10 MG capsule, Take  by mouth Every Night., Disp: , Rfl:   •  methotrexate 2.5 MG tablet, Take  by mouth. 6 tablets weekly, Disp: , Rfl:   •  metoprolol tartrate (LOPRESSOR) 25 MG tablet, TAKE ONE TABLET BY MOUTH TWICE A DAY, Disp: 180 tablet, Rfl: 0  •  Multiple Vitamins-Minerals (DAILY MULTIVITAMIN) capsule, DAILY MULTIVITAMIN CAPS, Disp: , Rfl:   •  ofloxacin (OCUFLOX) 0.3 % ophthalmic solution, Administer 1 drop into the left eye 4 (Four) Times a Day., Disp: , Rfl:   •  oxyCODONE-acetaminophen (PERCOCET)  MG per tablet, Take 1 tablet by mouth Every 6 (Six) Hours As Needed for Moderate Pain ., Disp: 120 tablet, Rfl: 0  •  pantoprazole (PROTONIX) 40 MG EC tablet, Take 40 mg by mouth Daily., Disp: , Rfl:   •  rosuvastatin (CRESTOR) 10 MG tablet, TAKE ONE TABLET BY MOUTH DAILY, Disp: 90 tablet, Rfl: 0  •  tamsulosin (FLOMAX) 0.4 MG capsule 24 hr capsule, Take 0.4 mg by mouth Daily., Disp: , Rfl:   •  vitamin B-12 (CYANOCOBALAMIN) 100 MCG tablet, Take 50  "mcg by mouth Daily., Disp: , Rfl:     Current Facility-Administered Medications:   •  Testosterone Cypionate (DEPOTESTOTERONE CYPIONATE) injection 200 mg, 200 mg, Intramuscular, Q14 Days, Abbi Rosales MD, 200 mg at 10/12/20 0951    Objective   /70 (BP Location: Left arm, Patient Position: Sitting, Cuff Size: Adult)   Pulse 68   Temp 97.1 °F (36.2 °C) (Temporal)   Resp 16   Ht 182.9 cm (72.01\")   Wt 94.1 kg (207 lb 6.4 oz)   SpO2 96%   BMI 28.12 kg/m²   Physical Exam  Vitals signs reviewed.   Constitutional:       General: He is not in acute distress.     Appearance: He is well-developed. He is not diaphoretic.   HENT:      Head: Normocephalic and atraumatic.      Right Ear: External ear normal.      Left Ear: External ear normal.      Nose: Nose normal.   Eyes:      General: No scleral icterus.        Right eye: No discharge.         Left eye: No discharge.      Conjunctiva/sclera: Conjunctivae normal.   Neck:      Musculoskeletal: Normal range of motion and neck supple.      Trachea: No tracheal deviation.   Cardiovascular:      Rate and Rhythm: Normal rate and regular rhythm.      Heart sounds: Normal heart sounds. No murmur. No friction rub. No gallop.    Pulmonary:      Effort: Pulmonary effort is normal. No respiratory distress.      Breath sounds: Normal breath sounds. No wheezing or rales.   Abdominal:      Tenderness: There is no abdominal tenderness.   Musculoskeletal: Normal range of motion.         General: No deformity.   Lymphadenopathy:      Cervical: No cervical adenopathy.   Skin:     General: Skin is warm and dry.      Findings: No rash.   Neurological:      Mental Status: He is alert and oriented to person, place, and time.      Cranial Nerves: No cranial nerve deficit.      Motor: No abnormal muscle tone.   Psychiatric:         Behavior: Behavior normal.         Thought Content: Thought content normal.           Assessment/Plan   Problems Addressed this Visit        " Cardiovascular and Mediastinum    Mixed hyperlipidemia    Relevant Orders    Comprehensive Metabolic Panel (Completed)    Lipid Panel (Completed)    Essential hypertension    Relevant Orders    Comprehensive Metabolic Panel (Completed)    Lipid Panel (Completed)       Endocrine    Type 2 diabetes mellitus with hyperglycemia, without long-term current use of insulin (CMS/[facility])    Relevant Orders    Comprehensive Metabolic Panel (Completed)    TSH (Completed)    Hemoglobin A1c (Completed)      Other Visit Diagnoses     DDD (degenerative disc disease), lumbar    -  Primary    Relevant Medications    oxyCODONE-acetaminophen (PERCOCET)  MG per tablet    Testosterone deficiency in male        Relevant Orders    CBC Auto Differential (Completed)    PSA DIAGNOSTIC ONLY (Completed)    Testosterone (Free & Total), LC / MS (Completed)    Benign prostatic hyperplasia with incomplete bladder emptying         Relevant Medications    tamsulosin (FLOMAX) 0.4 MG capsule 24 hr capsule    tadalafil (Cialis) 20 MG tablet    Other Relevant Orders    PSA DIAGNOSTIC ONLY (Completed)      Diagnoses       Codes Comments    DDD (degenerative disc disease), lumbar    -  Primary ICD-10-CM: M51.36  ICD-9-CM: 722.52     Essential hypertension     ICD-10-CM: I10  ICD-9-CM: 401.9     Mixed hyperlipidemia     ICD-10-CM: E78.2  ICD-9-CM: 272.2     Type 2 diabetes mellitus with hyperglycemia, without long-term current use of insulin (CMS/[facility])     ICD-10-CM: E11.65  ICD-9-CM: 250.00, 790.29     Testosterone deficiency in male     ICD-10-CM: E29.1  ICD-9-CM: 257.2     Benign prostatic hyperplasia with incomplete bladder emptying      ICD-10-CM: N40.1, R39.14  ICD-9-CM: 600.01, 788.21         Check labs  Refill meds  Ok to return to IM testosterone rather than topical         Procedures

## 2020-12-04 LAB
TESTOST FREE SERPL-MCNC: 10.6 PG/ML (ref 6.6–18.1)
TESTOST SERPL-MCNC: 523.3 NG/DL (ref 264–916)

## 2020-12-08 RX ORDER — ROSUVASTATIN CALCIUM 20 MG/1
20 TABLET, COATED ORAL DAILY
Qty: 90 TABLET | Refills: 1 | Status: SHIPPED | OUTPATIENT
Start: 2020-12-08 | End: 2021-06-25 | Stop reason: SDUPTHER

## 2020-12-08 NOTE — TELEPHONE ENCOUNTER
Patient advised that Dr. Rosales increased his rosuvastatin (CRESTOR) 10 MG tablet to 20mg a day. Pt needs a new rx sent as he is going to run out of medication since he is doubling up.     [facility] - [place], [place] - [address] - [phone]  - [phone] FX

## 2020-12-11 RX ORDER — DICLOFENAC SODIUM 75 MG/1
TABLET, DELAYED RELEASE ORAL
Qty: 180 TABLET | Refills: 0 | Status: SHIPPED | OUTPATIENT
Start: 2020-12-11 | End: 2021-08-12 | Stop reason: SDUPTHER

## 2020-12-21 ENCOUNTER — CLINICAL SUPPORT (OUTPATIENT)
Dept: FAMILY MEDICINE CLINIC | Facility: CLINIC | Age: 70
End: 2020-12-21

## 2020-12-21 DIAGNOSIS — E34.9 TESTOSTERONE DEFICIENCY: ICD-10-CM

## 2020-12-21 PROCEDURE — 96372 THER/PROPH/DIAG INJ SC/IM: CPT | Performed by: NURSE PRACTITIONER

## 2020-12-21 RX ADMIN — TESTOSTERONE CYPIONATE 200 MG: 200 INJECTION, SOLUTION INTRAMUSCULAR at 10:37

## 2020-12-22 RX ORDER — PANTOPRAZOLE SODIUM 40 MG/1
TABLET, DELAYED RELEASE ORAL
Qty: 90 TABLET | Refills: 2 | Status: SHIPPED | OUTPATIENT
Start: 2020-12-22 | End: 2021-09-22 | Stop reason: SDUPTHER

## 2020-12-30 RX ORDER — ROSUVASTATIN CALCIUM 10 MG/1
TABLET, COATED ORAL
Qty: 90 TABLET | Refills: 0 | Status: SHIPPED | OUTPATIENT
Start: 2020-12-30 | End: 2021-03-05 | Stop reason: ALTCHOICE

## 2020-12-30 RX ORDER — ALLOPURINOL 300 MG/1
TABLET ORAL
Qty: 90 TABLET | Refills: 0 | Status: SHIPPED | OUTPATIENT
Start: 2020-12-30 | End: 2021-03-21

## 2020-12-30 RX ORDER — POTASSIUM CHLORIDE 1500 MG/1
TABLET, EXTENDED RELEASE ORAL
Qty: 60 TABLET | Refills: 0 | Status: SHIPPED | OUTPATIENT
Start: 2020-12-30 | End: 2021-01-28

## 2021-01-04 ENCOUNTER — CLINICAL SUPPORT (OUTPATIENT)
Dept: FAMILY MEDICINE CLINIC | Facility: CLINIC | Age: 71
End: 2021-01-04

## 2021-01-04 DIAGNOSIS — E34.9 TESTOSTERONE DEFICIENCY: ICD-10-CM

## 2021-01-04 PROCEDURE — 96372 THER/PROPH/DIAG INJ SC/IM: CPT | Performed by: NURSE PRACTITIONER

## 2021-01-04 RX ADMIN — TESTOSTERONE CYPIONATE 200 MG: 200 INJECTION, SOLUTION INTRAMUSCULAR at 09:11

## 2021-01-07 RX ORDER — BUMETANIDE 2 MG/1
TABLET ORAL
Qty: 180 TABLET | Refills: 0 | Status: SHIPPED | OUTPATIENT
Start: 2021-01-07 | End: 2021-04-06

## 2021-01-12 RX ORDER — LEVOTHYROXINE SODIUM 0.1 MG/1
TABLET ORAL
Qty: 90 TABLET | Refills: 0 | Status: SHIPPED | OUTPATIENT
Start: 2021-01-12 | End: 2021-04-12

## 2021-01-12 RX ORDER — LISINOPRIL 5 MG/1
TABLET ORAL
Qty: 90 TABLET | Refills: 0 | Status: SHIPPED | OUTPATIENT
Start: 2021-01-12 | End: 2021-04-12

## 2021-01-18 ENCOUNTER — CLINICAL SUPPORT (OUTPATIENT)
Dept: FAMILY MEDICINE CLINIC | Facility: CLINIC | Age: 71
End: 2021-01-18

## 2021-01-18 DIAGNOSIS — E29.1 TESTOSTERONE DEFICIENCY IN MALE: ICD-10-CM

## 2021-01-18 PROCEDURE — 96372 THER/PROPH/DIAG INJ SC/IM: CPT | Performed by: NURSE PRACTITIONER

## 2021-01-18 RX ADMIN — TESTOSTERONE CYPIONATE 200 MG: 200 INJECTION, SOLUTION INTRAMUSCULAR at 09:34

## 2021-01-26 DIAGNOSIS — M51.36 DDD (DEGENERATIVE DISC DISEASE), LUMBAR: ICD-10-CM

## 2021-01-26 RX ORDER — OXYCODONE AND ACETAMINOPHEN 10; 325 MG/1; MG/1
1 TABLET ORAL EVERY 6 HOURS PRN
Qty: 120 TABLET | Refills: 0 | Status: SHIPPED | OUTPATIENT
Start: 2021-01-26 | End: 2021-04-15 | Stop reason: SDUPTHER

## 2021-01-26 NOTE — TELEPHONE ENCOUNTER
Caller: Arik August    Relationship: Self    Best call back number: [phone]    Medication needed:   Requested Prescriptions     Pending Prescriptions Disp Refills   • oxyCODONE-acetaminophen (PERCOCET)  MG per tablet 120 tablet 0     Sig: Take 1 tablet by mouth Every 6 (Six) Hours As Needed for Moderate Pain .       When do you need the refill by: 1/28/2021    Does the patient have less than a 3 day supply:  [] Yes  [x] No    What is the patient's preferred pharmacy: [facility] - [place], [place] - [address] [phone] [facility] [phone] FX

## 2021-01-28 RX ORDER — POTASSIUM CHLORIDE 1500 MG/1
TABLET, EXTENDED RELEASE ORAL
Qty: 60 TABLET | Refills: 0 | Status: SHIPPED | OUTPATIENT
Start: 2021-01-28 | End: 2021-03-05 | Stop reason: SDUPTHER

## 2021-02-01 ENCOUNTER — CLINICAL SUPPORT (OUTPATIENT)
Dept: FAMILY MEDICINE CLINIC | Facility: CLINIC | Age: 71
End: 2021-02-01

## 2021-02-01 PROCEDURE — 96372 THER/PROPH/DIAG INJ SC/IM: CPT | Performed by: NURSE PRACTITIONER

## 2021-02-01 RX ADMIN — TESTOSTERONE CYPIONATE 200 MG: 200 INJECTION, SOLUTION INTRAMUSCULAR at 09:00

## 2021-02-15 ENCOUNTER — CLINICAL SUPPORT (OUTPATIENT)
Dept: FAMILY MEDICINE CLINIC | Facility: CLINIC | Age: 71
End: 2021-02-15

## 2021-02-15 DIAGNOSIS — E29.1 DEFICIENCY OF TESTOSTERONE BIOSYNTHESIS: ICD-10-CM

## 2021-02-15 PROCEDURE — 96372 THER/PROPH/DIAG INJ SC/IM: CPT | Performed by: NURSE PRACTITIONER

## 2021-02-15 RX ADMIN — TESTOSTERONE CYPIONATE 200 MG: 200 INJECTION, SOLUTION INTRAMUSCULAR at 08:52

## 2021-02-25 RX ORDER — POTASSIUM CHLORIDE 1500 MG/1
TABLET, EXTENDED RELEASE ORAL
Qty: 60 TABLET | Refills: 0 | OUTPATIENT
Start: 2021-02-25

## 2021-02-28 RX ORDER — TAMSULOSIN HYDROCHLORIDE 0.4 MG/1
0.4 CAPSULE ORAL DAILY
Qty: 90 CAPSULE | Refills: 0 | Status: SHIPPED | OUTPATIENT
Start: 2021-02-28 | End: 2021-05-24

## 2021-03-01 ENCOUNTER — CLINICAL SUPPORT (OUTPATIENT)
Dept: FAMILY MEDICINE CLINIC | Facility: CLINIC | Age: 71
End: 2021-03-01

## 2021-03-01 DIAGNOSIS — E29.1 DEFICIENCY OF TESTOSTERONE BIOSYNTHESIS: ICD-10-CM

## 2021-03-01 PROCEDURE — 96372 THER/PROPH/DIAG INJ SC/IM: CPT | Performed by: NURSE PRACTITIONER

## 2021-03-01 RX ADMIN — TESTOSTERONE CYPIONATE 200 MG: 200 INJECTION, SOLUTION INTRAMUSCULAR at 09:07

## 2021-03-05 ENCOUNTER — OFFICE VISIT (OUTPATIENT)
Dept: FAMILY MEDICINE CLINIC | Facility: CLINIC | Age: 71
End: 2021-03-05

## 2021-03-05 VITALS
RESPIRATION RATE: 16 BRPM | TEMPERATURE: 96.6 F | WEIGHT: 215 LBS | BODY MASS INDEX: 29.12 KG/M2 | OXYGEN SATURATION: 97 % | DIASTOLIC BLOOD PRESSURE: 74 MMHG | HEIGHT: 72 IN | HEART RATE: 78 BPM | SYSTOLIC BLOOD PRESSURE: 128 MMHG

## 2021-03-05 DIAGNOSIS — E11.65 TYPE 2 DIABETES MELLITUS WITH HYPERGLYCEMIA, WITHOUT LONG-TERM CURRENT USE OF INSULIN (HCC): Primary | ICD-10-CM

## 2021-03-05 DIAGNOSIS — G89.29 CHRONIC THORACIC BACK PAIN, UNSPECIFIED BACK PAIN LATERALITY: ICD-10-CM

## 2021-03-05 DIAGNOSIS — E03.9 HYPOTHYROIDISM, UNSPECIFIED TYPE: ICD-10-CM

## 2021-03-05 DIAGNOSIS — Z00.00 PREVENTATIVE HEALTH CARE: ICD-10-CM

## 2021-03-05 DIAGNOSIS — M19.90 ARTHRITIS: ICD-10-CM

## 2021-03-05 DIAGNOSIS — E03.9 HYPOTHYROIDISM, UNSPECIFIED TYPE: Primary | ICD-10-CM

## 2021-03-05 DIAGNOSIS — M54.6 CHRONIC THORACIC BACK PAIN, UNSPECIFIED BACK PAIN LATERALITY: ICD-10-CM

## 2021-03-05 DIAGNOSIS — I10 ESSENTIAL HYPERTENSION: ICD-10-CM

## 2021-03-05 PROBLEM — Z01.818 PRE-OP EXAMINATION: Status: RESOLVED | Noted: 2020-09-10 | Resolved: 2021-03-05

## 2021-03-05 PROBLEM — S32.009K PSEUDOARTHROSIS OF LUMBAR SPINE: Status: ACTIVE | Noted: 2020-10-23

## 2021-03-05 PROBLEM — T84.498A: Status: ACTIVE | Noted: 2020-10-02

## 2021-03-05 PROBLEM — G56.20 CUBITAL TUNNEL SYNDROME: Status: ACTIVE | Noted: 2018-05-29

## 2021-03-05 PROBLEM — S22.009A THORACIC SPINE FRACTURE: Status: ACTIVE | Noted: 2018-09-10

## 2021-03-05 PROCEDURE — 99214 OFFICE O/P EST MOD 30 MIN: CPT | Performed by: FAMILY MEDICINE

## 2021-03-05 PROCEDURE — 90471 IMMUNIZATION ADMIN: CPT | Performed by: FAMILY MEDICINE

## 2021-03-05 PROCEDURE — 90715 TDAP VACCINE 7 YRS/> IM: CPT | Performed by: FAMILY MEDICINE

## 2021-03-05 RX ORDER — POTASSIUM CHLORIDE 20 MEQ/1
20 TABLET, EXTENDED RELEASE ORAL 2 TIMES DAILY
Qty: 60 TABLET | Refills: 0 | Status: SHIPPED | OUTPATIENT
Start: 2021-03-05 | End: 2021-04-05

## 2021-03-05 NOTE — PROGRESS NOTES
Chief Complaint   Patient presents with   • Diabetes     3 month f/u     HPI  Arik August is a 70 y.o. male that presents for   Chief Complaint   Patient presents with   • Diabetes     3 month f/u     DM: last a1c 6.2. Not currently maintained on any medication. Managed w/ diet and exercise. He has neuropathy related to chronic back pain but this is intermittent. Not painful. 11/2018 microalb/Cr 5.6. Last eye exam 9/2020.    HTN: 128/74 today. Maintained on metoprolol 25 BID, lisinopril 5 daily. No LH/dizziness, CP or SOB.    Hypothyroidism: maintained on levothyroxine 100mcg daily. No heat/cold intolerance. Weight stable over the last year.    Back/neck pain: had surgery w/ Hugo Ascencio) back in 10/2020. He is still in neck immobilizer but hoping to get out soon. Has f/u soon. Still has pain but has improved. Maintained on baclofen 10 TID and oxycodone 10 q6 PRN- generally about once daily. He has been off diclofenac BID due to bone helping.    Arthritis: patient w/ hx RA, gout, pseudogout. Follows w/ rheumatology- [facility] every 3 months. Unable to take diclofenac due to back surgery but hoping to resume next week when he has f/u w/ Dr Terry. Maintianed on allopurinol, colchine 0.6 daily, MTX 25mg weekly, and oxycodone 10 PRN    Review of Systems   Constitutional: Negative for chills, fever and unexpected weight loss.   HENT: Negative for congestion and rhinorrhea.    Eyes: Negative for visual disturbance.   Respiratory: Negative for cough and shortness of breath.    Cardiovascular: Negative for chest pain and palpitations.   Endocrine: Negative for cold intolerance and heat intolerance.   Musculoskeletal: Positive for arthralgias, back pain and neck pain.   Neurological: Positive for numbness. Negative for dizziness and light-headedness.     The following portions of the patient's history were reviewed and updated as appropriate: problem list, past medical history, past surgical history, allergies, current  medication    Problem List Tab  Patient History Tab  Immunizations Tab  Medications Tab  Chart Review Tab  Care Everywhere Tab  Synopsis Tab    PE  Vitals:    03/05/21 1119   BP: 128/74   Pulse: 78   Resp: 16   Temp: 96.6 °F (35.9 °C)   SpO2: 97%     Body mass index is 29.15 kg/m².  General: Well nourished, NAD  Head: AT/NC  Eyes: EOMI, anicteric sclera  Resp: CTAB, SCR, BS equal  CV: RRR w/o m/r/g; 2+ pulses  GI: Soft, NT/ND, +BS  MSK: Limited ROM in hands, neck immobilizer in place. Degenerative changes to hands bilaterally  Skin: Warm, dry, intact  Neuro: Alert and oriented. No focal deficits  Psych: Appropriate mood and affect    Imaging  No Images in the past 120 days found..    Assessment/Plan   Arik August is a 70 y.o. male that presents for   Chief Complaint   Patient presents with   • Diabetes     3 month f/u     Diagnoses and all orders for this visit:    1. Type 2 diabetes mellitus with hyperglycemia, without long-term current use of insulin (CMS/[facility]) (Primary): Last A1c 6.2. 11/2018 microalb/Cr 5.6. Last eye exam 9/2020.  -     Microalbumin / Creatinine Urine Ratio - Urine, Clean Catch  -     CBC (No Diff)  -     Comprehensive Metabolic Panel  -     Hemoglobin A1c  - Continue lifestyle modification-diet and exercise  - Continue home ACE inhibitor and statin  - Recommend annual eye exam    2. Essential hypertension: 128/74 today  -     Comprehensive Metabolic Panel  - Continue home lisinopril 5, metoprolol 25 twice daily    3. Hypothyroidism, unspecified type  -     TSH  -     T4, Free  - Continue home levothyroxine 100 mcg daily for now    4. Chronic thoracic back pain, unspecified back pain laterality   -Continue Ortho [id number]. [facility]   -Continue home baclofen 10 3 times daily, oxycodone 10 daily as needed   -Unable to take NSAIDs per Ortho due to poor healing on previous surgeries   -Neck immobilizer per Ortho    5. Arthritis  -     C-reactive Protein  -     Sedimentation Rate  -     Uric  Acid  - Continue home allopurinol and colchicine  - Recommend restarting diclofenac as soon as approved by Ortho    6. Preventative health care  -     Tdap Vaccine Greater Than or Equal To 6yo IM  -     Hepatitis C antibody    Other orders  -     potassium chloride (KLOR-CON) 20 MEQ CR tablet; Take 1 tablet by mouth 2 (Two) Times a Day.  Dispense: 60 tablet; Refill: 0       Return in about 3 months (around 6/5/2021) for Recheck- 30min- [place].

## 2021-03-15 ENCOUNTER — LAB (OUTPATIENT)
Dept: FAMILY MEDICINE CLINIC | Facility: CLINIC | Age: 71
End: 2021-03-15

## 2021-03-15 ENCOUNTER — CLINICAL SUPPORT (OUTPATIENT)
Dept: FAMILY MEDICINE CLINIC | Facility: CLINIC | Age: 71
End: 2021-03-15

## 2021-03-15 DIAGNOSIS — E03.9 HYPOTHYROIDISM, UNSPECIFIED TYPE: ICD-10-CM

## 2021-03-15 DIAGNOSIS — E29.1 HYPOGONADISM IN MALE: ICD-10-CM

## 2021-03-15 LAB
ALBUMIN SERPL-MCNC: 4.2 G/DL (ref 3.5–5.2)
ALBUMIN UR-MCNC: 1.6 MG/DL
ALBUMIN/GLOB SERPL: 1.4 G/DL
ALP SERPL-CCNC: 77 U/L (ref 39–117)
ALT SERPL W P-5'-P-CCNC: 24 U/L (ref 1–41)
ANION GAP SERPL CALCULATED.3IONS-SCNC: 12 MMOL/L (ref 5–15)
AST SERPL-CCNC: 24 U/L (ref 1–40)
BILIRUB SERPL-MCNC: 0.4 MG/DL (ref 0–1.2)
BUN SERPL-MCNC: 20 MG/DL (ref 8–23)
BUN/CREAT SERPL: 17.5 (ref 7–25)
CALCIUM SPEC-SCNC: 9.1 MG/DL (ref 8.6–10.5)
CHLORIDE SERPL-SCNC: 96 MMOL/L (ref 98–107)
CO2 SERPL-SCNC: 30 MMOL/L (ref 22–29)
CREAT SERPL-MCNC: 1.14 MG/DL (ref 0.76–1.27)
CREAT UR-MCNC: 148.4 MG/DL
CRP SERPL-MCNC: 1.04 MG/DL (ref 0–0.5)
DEPRECATED RDW RBC AUTO: 54 FL (ref 37–54)
ERYTHROCYTE [DISTWIDTH] IN BLOOD BY AUTOMATED COUNT: 15.7 % (ref 12.3–15.4)
ERYTHROCYTE [SEDIMENTATION RATE] IN BLOOD: 13 MM/HR (ref 0–20)
GFR SERPL CREATININE-BSD FRML MDRD: 64 ML/MIN/1.73
GLOBULIN UR ELPH-MCNC: 2.9 GM/DL
GLUCOSE SERPL-MCNC: 178 MG/DL (ref 65–99)
HCT VFR BLD AUTO: 48.7 % (ref 37.5–51)
HCV AB SER DONR QL: NORMAL
HGB BLD-MCNC: 16 G/DL (ref 13–17.7)
MCH RBC QN AUTO: 31.2 PG (ref 26.6–33)
MCHC RBC AUTO-ENTMCNC: 32.9 G/DL (ref 31.5–35.7)
MCV RBC AUTO: 94.9 FL (ref 79–97)
MICROALBUMIN/CREAT UR: 10.8 MG/G
PLATELET # BLD AUTO: 265 10*3/MM3 (ref 140–450)
PMV BLD AUTO: 9.8 FL (ref 6–12)
POTASSIUM SERPL-SCNC: 5 MMOL/L (ref 3.5–5.2)
PROT SERPL-MCNC: 7.1 G/DL (ref 6–8.5)
RBC # BLD AUTO: 5.13 10*6/MM3 (ref 4.14–5.8)
SODIUM SERPL-SCNC: 138 MMOL/L (ref 136–145)
T4 FREE SERPL-MCNC: 1.01 NG/DL (ref 0.93–1.7)
TSH SERPL DL<=0.05 MIU/L-ACNC: 1.31 UIU/ML (ref 0.27–4.2)
URATE SERPL-MCNC: 5.2 MG/DL (ref 3.4–7)
WBC # BLD AUTO: 6.68 10*3/MM3 (ref 3.4–10.8)

## 2021-03-15 PROCEDURE — 84443 ASSAY THYROID STIM HORMONE: CPT | Performed by: FAMILY MEDICINE

## 2021-03-15 PROCEDURE — 82043 UR ALBUMIN QUANTITATIVE: CPT | Performed by: FAMILY MEDICINE

## 2021-03-15 PROCEDURE — 84550 ASSAY OF BLOOD/URIC ACID: CPT | Performed by: FAMILY MEDICINE

## 2021-03-15 PROCEDURE — 83036 HEMOGLOBIN GLYCOSYLATED A1C: CPT | Performed by: FAMILY MEDICINE

## 2021-03-15 PROCEDURE — 96372 THER/PROPH/DIAG INJ SC/IM: CPT | Performed by: NURSE PRACTITIONER

## 2021-03-15 PROCEDURE — 36415 COLL VENOUS BLD VENIPUNCTURE: CPT | Performed by: FAMILY MEDICINE

## 2021-03-15 PROCEDURE — 85027 COMPLETE CBC AUTOMATED: CPT | Performed by: FAMILY MEDICINE

## 2021-03-15 PROCEDURE — 86140 C-REACTIVE PROTEIN: CPT | Performed by: FAMILY MEDICINE

## 2021-03-15 PROCEDURE — 86803 HEPATITIS C AB TEST: CPT | Performed by: FAMILY MEDICINE

## 2021-03-15 PROCEDURE — 85652 RBC SED RATE AUTOMATED: CPT | Performed by: FAMILY MEDICINE

## 2021-03-15 PROCEDURE — 80053 COMPREHEN METABOLIC PANEL: CPT | Performed by: FAMILY MEDICINE

## 2021-03-15 PROCEDURE — 82570 ASSAY OF URINE CREATININE: CPT | Performed by: FAMILY MEDICINE

## 2021-03-15 PROCEDURE — 84439 ASSAY OF FREE THYROXINE: CPT | Performed by: FAMILY MEDICINE

## 2021-03-15 RX ADMIN — TESTOSTERONE CYPIONATE 200 MG: 200 INJECTION, SOLUTION INTRAMUSCULAR at 08:56

## 2021-03-17 LAB — HBA1C MFR BLD: 6.3 % (ref 3.5–5.6)

## 2021-03-18 ENCOUNTER — OFFICE VISIT (OUTPATIENT)
Dept: CARDIOLOGY | Facility: CLINIC | Age: 71
End: 2021-03-18

## 2021-03-18 VITALS
BODY MASS INDEX: 29.39 KG/M2 | DIASTOLIC BLOOD PRESSURE: 80 MMHG | HEART RATE: 76 BPM | WEIGHT: 217 LBS | SYSTOLIC BLOOD PRESSURE: 148 MMHG | HEIGHT: 72 IN | OXYGEN SATURATION: 96 %

## 2021-03-18 DIAGNOSIS — E78.2 MIXED HYPERLIPIDEMIA: ICD-10-CM

## 2021-03-18 DIAGNOSIS — Z95.1 S/P CABG X 5: ICD-10-CM

## 2021-03-18 DIAGNOSIS — E11.65 TYPE 2 DIABETES MELLITUS WITH HYPERGLYCEMIA, WITHOUT LONG-TERM CURRENT USE OF INSULIN (HCC): ICD-10-CM

## 2021-03-18 DIAGNOSIS — I25.10 CORONARY ARTERY DISEASE INVOLVING NATIVE CORONARY ARTERY OF NATIVE HEART WITHOUT ANGINA PECTORIS: ICD-10-CM

## 2021-03-18 DIAGNOSIS — I25.5 ISCHEMIC CARDIOMYOPATHY: Primary | ICD-10-CM

## 2021-03-18 PROCEDURE — 99214 OFFICE O/P EST MOD 30 MIN: CPT | Performed by: INTERNAL MEDICINE

## 2021-03-18 PROCEDURE — 93000 ELECTROCARDIOGRAM COMPLETE: CPT | Performed by: INTERNAL MEDICINE

## 2021-03-18 RX ORDER — CALCIUM CARBONATE/VITAMIN D3 500-10/5ML
LIQUID (ML) ORAL
COMMUNITY
End: 2021-03-18 | Stop reason: SDUPTHER

## 2021-03-21 RX ORDER — ALLOPURINOL 300 MG/1
TABLET ORAL
Qty: 90 TABLET | Refills: 0 | Status: SHIPPED | OUTPATIENT
Start: 2021-03-21 | End: 2021-06-21

## 2021-03-29 ENCOUNTER — CLINICAL SUPPORT (OUTPATIENT)
Dept: FAMILY MEDICINE CLINIC | Facility: CLINIC | Age: 71
End: 2021-03-29

## 2021-03-29 DIAGNOSIS — E29.1 DEFICIENCY OF TESTOSTERONE BIOSYNTHESIS: ICD-10-CM

## 2021-03-29 PROCEDURE — 96372 THER/PROPH/DIAG INJ SC/IM: CPT | Performed by: NURSE PRACTITIONER

## 2021-03-29 RX ADMIN — TESTOSTERONE CYPIONATE 200 MG: 200 INJECTION, SOLUTION INTRAMUSCULAR at 08:53

## 2021-04-05 RX ORDER — POTASSIUM CHLORIDE 1500 MG/1
TABLET, EXTENDED RELEASE ORAL
Qty: 60 TABLET | Refills: 0 | Status: SHIPPED | OUTPATIENT
Start: 2021-04-05 | End: 2021-05-07

## 2021-04-06 RX ORDER — BUMETANIDE 2 MG/1
TABLET ORAL
Qty: 180 TABLET | Refills: 0 | Status: SHIPPED | OUTPATIENT
Start: 2021-04-06 | End: 2021-07-06

## 2021-04-12 ENCOUNTER — CLINICAL SUPPORT (OUTPATIENT)
Dept: FAMILY MEDICINE CLINIC | Facility: CLINIC | Age: 71
End: 2021-04-12

## 2021-04-12 DIAGNOSIS — E29.1 DEFICIENCY OF TESTOSTERONE BIOSYNTHESIS: ICD-10-CM

## 2021-04-12 PROCEDURE — 96372 THER/PROPH/DIAG INJ SC/IM: CPT | Performed by: NURSE PRACTITIONER

## 2021-04-12 RX ORDER — LEVOTHYROXINE SODIUM 0.1 MG/1
TABLET ORAL
Qty: 90 TABLET | Refills: 0 | Status: SHIPPED | OUTPATIENT
Start: 2021-04-12 | End: 2021-07-12

## 2021-04-12 RX ORDER — LISINOPRIL 5 MG/1
TABLET ORAL
Qty: 90 TABLET | Refills: 0 | Status: SHIPPED | OUTPATIENT
Start: 2021-04-12 | End: 2021-07-12

## 2021-04-12 RX ADMIN — TESTOSTERONE CYPIONATE 200 MG: 200 INJECTION, SOLUTION INTRAMUSCULAR at 09:24

## 2021-04-12 NOTE — TELEPHONE ENCOUNTER
Patient came in for [facility] appointment and request refill for his Oxycodone  mg as well as the two medications that are already pending with [facility] pharmacy.    Thank you.

## 2021-04-15 DIAGNOSIS — M51.36 DDD (DEGENERATIVE DISC DISEASE), LUMBAR: ICD-10-CM

## 2021-04-15 NOTE — TELEPHONE ENCOUNTER
Caller: Arik August    Relationship: Self    Best call back number: [phone]    Medication needed:   Requested Prescriptions     Pending Prescriptions Disp Refills   • oxyCODONE-acetaminophen (PERCOCET)  MG per tablet 120 tablet 0     Sig: Take 1 tablet by mouth Every 6 (Six) Hours As Needed for Moderate Pain .       When do you need the refill by: 3 days    What is the patient's preferred pharmacy: [facility] - [place], [place] - [address] [phone] [facility] [phone] FX

## 2021-04-19 ENCOUNTER — TELEPHONE (OUTPATIENT)
Dept: FAMILY MEDICINE CLINIC | Facility: CLINIC | Age: 71
End: 2021-04-19

## 2021-04-19 RX ORDER — OXYCODONE AND ACETAMINOPHEN 10; 325 MG/1; MG/1
1 TABLET ORAL EVERY 6 HOURS PRN
Qty: 120 TABLET | Refills: 0 | Status: SHIPPED | OUTPATIENT
Start: 2021-04-19 | End: 2022-01-17 | Stop reason: SDUPTHER

## 2021-04-19 NOTE — TELEPHONE ENCOUNTER
Phylicia, will you call and in explain this to him that it is for a couple of reasons: 1. Because I have not yet seen him and 2. it is my preference to not treat chronic pain with opiates. I don't think anyone else in the practice will either.  With Dr. Rosales and Dr. Melani moody, I am sending almost all of them to pain management.

## 2021-04-19 NOTE — TELEPHONE ENCOUNTER
Caller: Wilman August    Relationship: Self    Best call back number: [phone]    What is the best time to reach you: ANY    Who are you requesting to speak with (clinical staff, provider,  specific staff member): CLINICAL STAFF    Do you know the name of the person who called: WILMAN    What was the call regarding: PATIENT CALLED AND STATED HE WOULD LIKE TO KNOW WHY HIS CASE IS BEING REFERRED TO PAIN MANAGEMENT, HE WOULD RATHER BE SEEN AT THIS PRACTICE. HE WILL GO IF HE HAS TO, BUT HE WOULD LIKE TO KNOW THE REASONING BEHIND THIS, IS IS BECAUSE OJ ASNDY IS NOT ABLE TO WRITE THE PRESCRIPTIONS ANYMORE. HE WOULD LIKE TO BE ADVISED OF THIS..  OR WOULD HE BE ABLE TO STAY WITH THIS PRACTICE WITH AN MD, HE WOULD LIKE TO KNOW.   STATED THAT THE PAIN MANAGEMENT ONLY WANTED TO GIVE HIM SHOTS AND THEY DID NOT WORK     Do you require a callback: YES

## 2021-04-19 NOTE — TELEPHONE ENCOUNTER
Please let pt know that I refilled this, but also made a referral for him to see pain management. I will not cont to write for this.

## 2021-04-26 ENCOUNTER — CLINICAL SUPPORT (OUTPATIENT)
Dept: FAMILY MEDICINE CLINIC | Facility: CLINIC | Age: 71
End: 2021-04-26

## 2021-04-26 DIAGNOSIS — E29.1 DEFICIENCY OF TESTOSTERONE BIOSYNTHESIS: ICD-10-CM

## 2021-04-26 PROCEDURE — 96372 THER/PROPH/DIAG INJ SC/IM: CPT | Performed by: NURSE PRACTITIONER

## 2021-04-26 RX ADMIN — TESTOSTERONE CYPIONATE 200 MG: 200 INJECTION, SOLUTION INTRAMUSCULAR at 08:40

## 2021-04-28 NOTE — PROGRESS NOTES
CHIEF COMPLAINT  Neck pain, lower back pain, generalized joint pain.       Subjective   Arik August is a 70 y.o. male who was referred by Mary Busby APRN  to our pain management clinic for consultation, evaluation and treatment of generalized joint pain, neck pain, lower back pain.  Patient has significant surgical history in the past and has been on chronic pain medications for years.    *** pain is {1-10:50913}/10 on VAS, at maximum is ***/10. Pain is {pain quality:40439} in nature. Pain {Is/is not:9024} referred ***. The pain is {CONSTANT/INTERMITTENT/OCCASIONAL:9336801032}. The pain is improved by ***. The pain is worse with ***.      PMH:   DM-2, Seropositive RA, Gout, CAD s/p CABG, HF, HTN, psoriasis, cubital syndrome, b/l TKA    Rheumatologist - Marvin Mathis MD - methotrexate, colchicine, diclofenac   Ortho - Hollis Dallas II, MD     PSH:   PSF L3-5 2011  PSF C2-T1 6/4/13   PSF L2-3 12/31/13  ALIF L5-S1 9/12/17  PSF T11-pelvis 9/14/17  PSF C6-T11 9/13/18  HWR C2-pelvis; PSF C6-T8 L5-S1 10/23/2020 - by Dr. Hollis Zendejas II, MD - [place]    Current Medications:   Percocet  mg QID PRN - 4/19/2021  Diclofenac 75 mg twice daily as needed  Baclofen 10 mg  Methotrexate  Colchicine      Past Medications:  Lyrica     Past Modalities:  TENS:       {YES NO:54779}          Physical Therapy Within The Last 6 Months     {YES NO:23668}  Psychotherapy     {YES NO:67023}  Massage Therapy      {YES NO:91587}    Patient Complains Of:  Uro-Fecal Incontinence {YES NO:29399}  Weight Gain/Loss  {YES NO:66173}  Fever/Chills   {YES NO:53692}  Weakness   {YES NO:63513}      PEG Assessment   What number best describes your pain on average in the past week?{NUMBERS 0-10:75754}  What number best describes how, during the past week, pain has interfered with your enjoyment of life?{NUMBERS 0-10:21354}  What number best describes how, during the past week, pain has interfered with your general activity?  {NUMBERS  0-10:78597}        Current Outpatient Medications:   •  allopurinol (ZYLOPRIM) 300 MG tablet, TAKE ONE TABLET BY MOUTH DAILY, Disp: 90 tablet, Rfl: 0  •  aspirin (aspirin) 81 MG EC tablet, Take 1 tablet by mouth Daily., Disp: , Rfl:   •  baclofen (LIORESAL) 10 MG tablet, Take 1 tablet by mouth 3 (Three) Times a Day As Needed for Muscle Spasms., Disp: 270 tablet, Rfl: 1  •  bumetanide (BUMEX) 2 MG tablet, TAKE ONE TABLET BY MOUTH TWICE A DAY AS NEEDED, Disp: 180 tablet, Rfl: 0  •  colchicine 0.6 MG tablet, Take 1 tablet by mouth Daily., Disp: , Rfl:   •  cycloSPORINE (RESTASIS) 0.05 % ophthalmic emulsion, RESTASIS 0.05 % EMUL, Disp: , Rfl:   •  diclofenac (VOLTAREN) 75 MG EC tablet, TAKE ONE TABLET BY MOUTH TWICE A DAY AS NEEDED, Disp: 180 tablet, Rfl: 0  •  ferrous sulfate 325 (65 FE) MG tablet, TAKE ONE TABLET BY MOUTH DAILY, Disp: 90 tablet, Rfl: 0  •  folic acid (FOLVITE) 1 MG tablet, 2 tablets daily, Disp: , Rfl:   •  guaiFENesin (Mucinex) 600 MG 12 hr tablet, Take 2 tablets by mouth 2 (Two) Times a Day., Disp: 120 tablet, Rfl: 1  •  KLOR-CON 20 MEQ CR tablet, TAKE ONE TABLET BY MOUTH TWICE A DAY, Disp: 60 tablet, Rfl: 0  •  levothyroxine (SYNTHROID, LEVOTHROID) 100 MCG tablet, TAKE ONE TABLET BY MOUTH DAILY, Disp: 90 tablet, Rfl: 0  •  lisinopril (PRINIVIL,ZESTRIL) 5 MG tablet, TAKE ONE TABLET BY MOUTH DAILY, Disp: 90 tablet, Rfl: 0  •  magnesium oxide (MAGOX) 400 (241.3 Mg) MG tablet tablet, Take 1 tablet by mouth Daily., Disp: 90 tablet, Rfl: 0  •  Melatonin 10 MG capsule, Take  by mouth Every Night., Disp: , Rfl:   •  methotrexate 2.5 MG tablet, Take  by mouth. 10 tablets weekly, Disp: , Rfl:   •  metoprolol tartrate (LOPRESSOR) 25 MG tablet, TAKE ONE TABLET BY MOUTH TWICE A DAY, Disp: 180 tablet, Rfl: 0  •  Multiple Vitamins-Minerals (DAILY MULTIVITAMIN) capsule, DAILY MULTIVITAMIN CAPS, Disp: , Rfl:   •  ofloxacin (OCUFLOX) 0.3 % ophthalmic solution, Administer 1 drop into the left eye 4 (Four) Times a  Day., Disp: , Rfl:   •  oxyCODONE-acetaminophen (PERCOCET)  MG per tablet, Take 1 tablet by mouth Every 6 (Six) Hours As Needed for Moderate Pain ., Disp: 120 tablet, Rfl: 0  •  pantoprazole (PROTONIX) 40 MG EC tablet, TAKE ONE TABLET BY MOUTH DAILY, Disp: 90 tablet, Rfl: 2  •  rosuvastatin (CRESTOR) 20 MG tablet, Take 1 tablet by mouth Daily., Disp: 90 tablet, Rfl: 1  •  tadalafil (Cialis) 20 MG tablet, Take 1 tablet by mouth Daily As Needed for Erectile Dysfunction., Disp: 30 tablet, Rfl: 1  •  tamsulosin (FLOMAX) 0.4 MG capsule 24 hr capsule, Take 1 capsule by mouth Daily., Disp: 90 capsule, Rfl: 0  •  vitamin B-12 (CYANOCOBALAMIN) 100 MCG tablet, Take 50 mcg by mouth Daily., Disp: , Rfl:     Current Facility-Administered Medications:   •  Testosterone Cypionate (DEPOTESTOTERONE CYPIONATE) injection 200 mg, 200 mg, Intramuscular, Q14 Days, Abbi Rosales MD, 200 mg at 04/26/21 0840    {Common H&P Review Areas:30107}      REVIEW OF PERTINENT MEDICAL DATA    Past Medical History:   Diagnosis Date   • 3-vessel CAD 12/24/2018    Severe Distal Main Noted on Cardiac Cath w/High-Grade Obstruction Involving the LAD; S/p CABG 12/2018   • Arthritis    • Atelectasis 12/12/2018    Noted on CT Chest   • Carpal tunnel syndrome     S/p Bret Release   • Chest pain due to CAD (CMS/[facility]) 12/2018   • CHF (congestive heart failure) (CMS/[facility]) 2018   • Chronic back pain     x2 Lumbar Fusions   • Chronic neck pain     Hx Cervical Fusion   • DDD (degenerative disc disease), lumbar    • Dyspnea on exertion 12/2018   • Emphysema of lung (CMS/[facility]) 12/12/2018    Noted on CT Chest   • Encounter for general adult medical examination without abnormal findings 10/1/2013   • HFrEF (heart failure with reduced ejection fraction) (CMS/[facility]) 2/7/2019   • History of echocardiogram 12/21/18-[facility]    EF 40%; Mild L Atrial Enlargement Measuring 4.3CM; Normal Root/No Effusion; Ischemic Cardiomyopathy Changes Noted; Mild MVR Noted   • History of  EKG 12/20/18-BHF    Sinus Tachycardia Noted w/Abnormal Changes   • HLD (hyperlipidemia)    • Hypertension    • Hypothyroidism    • Iron deficiency anemia 12/26/2018-PO    w/Iron Transfusion   • Ischemic cardiomyopathy 12/21/2018    Noted on Echo   • LAD stenosis 12/24/2018    L Circumfrex @ 90% Narrowing w/Severe Blockage of Obtuse Marginal Branch and 99% Narrowing of Ostal Ramus INtermedius Branch   • Lung nodule 12/21/2018    Small Stable in R Noted on CT Chest   • Mild atrial enlargement, left 12/21/2018    Noted on Echo   • Mild mitral valve regurgitation 12/21/2018    Noted on Echo   • Orthopnea 12/2018   • Osteoarthritis     Severe All Over; Hx Several Surgeries; Takes Oxycodone Daily   • Pleural effusion, bilateral 12/12/18-Moderate & 12/21/18-Small    Noted on CT Chest   • Pulmonary edema 12/2018   • RCA occlusion (CMS/HCC) 12/24/2018    Total Occlusion Noted on Cardiac Cath   • Sinus tachycardia 12/20/18-B[facility]    Noted on EKG   • SOB (shortness of breath) 12/2018     Past Surgical History:   Procedure Laterality Date   • ANKLE SURGERY Right    • CARDIAC CATHETERIZATION Left 12/24/18-B[facility]    w/R Coronary Arteriography/Coronary Angiography--Dr. Gardiner--Severe Distal L Main Disease with 3V CAD w/High-Grade Obstruction Involving the LAD L Cirumflex Ramus   • CARPAL TUNNEL RELEASE Bilateral     x2   • CERVICAL FUSION      x1   • COLONOSCOPY  2001    WNL   • CORONARY ARTERY BYPASS GRAFT  12/26/18-BHF    Urgent x5 w/L Vein Grafting-Dr. Aragon   • ELBOW ARTHROSCOPY Right    • ENDOSCOPIC VEIN HARVEST  12/26/18-BHF    R Lower Extremity-Dr. Aragon   • EXTERNAL EAR SURGERY Left    • KNEE ARTHROSCOPY Right     x3   • KNEE SURGERY Left    • LUMBAR FUSION      x2   • NASAL FRACTURE SURGERY     • OTHER SURGICAL HISTORY      R Forearm Surgery   • REPLACEMENT TOTAL KNEE Bilateral    • SHOULDER SURGERY Left    • SPINE SURGERY  2011   • SPINE SURGERY  2013   • SPINE SURGERY  2017    x2    • SPINE SURGERY  2018   • SPINE SURGERY        Family History   Problem Relation Age of Onset   • Cancer Mother    • Heart attack Father    • Heart disease Father      Social History     Socioeconomic History   • Marital status:      Spouse name: Melissa   • Number of children: Not on file   • Years of education: Not on file   • Highest education level: Not on file   Tobacco Use   • Smoking status: Former Smoker     Packs/day: 1.50     Years: 15.00     Pack years: 22.50     Types: Cigarettes     Quit date: 1992     Years since quittin.0   • Smokeless tobacco: Never Used   Vaping Use   • Vaping Use: Never used   Substance and Sexual Activity   • Alcohol use: Yes     Comment: Occ Beer   • Drug use: No   • Sexual activity: Defer         Review of Systems      There were no vitals filed for this visit.      Objective   Physical Exam      Imaging Reviewed:    MRI cervical spine without- 18  Posterior fusion of C2-T1 is noted. There is been posterior decompression of C3-C7.   Subtle region of T2 hyperdense signal is noted in the posterior columns of the cord at C5-C6 consistent with mild myelomalacia.   There are severe degenerative changes at the C1-C2 level with thickening of the transverse ligament that may represent pannus formation. There is edematous changes at the atlantooccipital and atlantoaxial joints and small cysts within the odontoid   process. Mild canal narrowing is noted at C1-C2.     C2-C3: Fusion and decompression is noted. No canal narrowing is present. Mild foraminal narrowing is present due to facet hypertrophy.     C3-C4: Fusion and decompression are noted. No canal or foraminal narrowing is identified.     C4-C5: Small endplate osteophyte formation is noted at this fused and decompressed level. No canal narrowing is present. Less neural foramen is mildly narrowed.     C5-C6: Osteophyte formation project into the canal on the left side causing flattening of the thecal sac and subtle flattening of the left-sided the  cord. Facet hypertrophy is present. There is mild left foraminal narrowing. Fusion and decompression are   noted.     C6-C7: Small disc bulge is noted. There is mild retrolisthesis and disc osteophyte formation. Spinal canal is patent. There has been decompression. Moderate bilateral foraminal narrowing is noted in greater on the right side.     C7-T1: Mild anterolisthesis is present. Facet arthropathy is noted. There is uncovertebral spurring is well. Moderate bilateral foraminal narrowing is noted. Ligament of flavum thickening occurs beyond the decompression and there is mild to moderate   spinal stenosis and mild cord flattening.     IMPRESSION:  1. Posterior fusion of C2-T1. With expected postsurgical changes. A small region of myelomalacia is noted within the posterior cord at C5-C6.     2. Stable degenerative spondylosis at C7-T1 that results in moderate bilateral foraminal narrowing and mild canal stenosis.     3. Severe degenerative changes at C1-C2 with what is likely pass formation along the posterior odontoid process. The pannus formation is nonspecific but could be related to rheumatoid arthritis.    MRI thoracic spine 4/2/18:  1. Posterior fusion of the lower thoracic and lumbar spine extending from T11.     2. Degenerative spondylosis at T10-T11, with moderate spinal stenosis, mild to moderate cord flattening and moderate foraminal narrowing.     3. Moderate to severe bilateral foraminal stenosis at T2-T3.     4. Mild to moderate degenerative changes are seen elsewhere in the thoracic spine.    Xray bilateral hands and wrists 4/26/18:  Multifocal productive arthritis involving the bilateral hands and wrists with chondrocalcinosis of the bilateral triangular fibrocartilage. Findings favors CPPD arthritis.         Assessment:    No diagnosis found.       Plan:  1. New patient visit, urine drug screen per office policy. UDS today to monitor for compliance with medication use. The UDS is medically  necessary to monitor for compliance due to the potential side effects and complications of misuse of opioids. UDS done today will review on next visit.     2. Discussed with the patient that pain medicine physicians use a variety of evidence based treatment modalities in order to treat various painful conditions. This may include referral to physical therapy, the use of interventional procedures, referral for psychological assessment and treatment, and the use of both opioid and non-opioid medications as appropriate for the treatment of pain. Informed the patient that opioids have not been proven to be effective for the long term treatment chronic pain conditions, hence our conservative use of these medications. Discussed with the patient that our goal is to use a variety of treatment options that may be indicated for their condition in order to manage their pain and improve their overall functionality. The patient expressed understanding.   3. We discussed trying a course of formal physical therapy.  Physical therapy can help strengthen and stretch the muscles around the joints. Continue to be as active as possible. Start physical therapy as it will help generalized pain and follow up with HEP.   4. Discussed with the patient regarding the etiology of their pain. Informed them that they would likely benefit from a ***. The procedure was described in detail and the risks, benefits and alternatives were discussed with the patient (including but not limited to: bleeding, infection, nerve damage, worsening of pain, CSF leak, inability to perform injection, paralysis, seizures, and death) who agreed to proceed.         RTC in 8 weeks.     Moise Sheriff DO  Pain Management   [facility]    As part of the patient's treatment plan, I may be prescribing controlled substances. The patient has been made aware of appropriate use of such medications, including potential risk of somnolence, limited  ability to drive and/or work safely, and the potential for dependence or overdose. It has also been made clear that these medications are for use by this patient only, without concomitant use of alcohol or other substances unless prescribed.     Patient has completed prescribing agreement detailing terms of continued prescribing of controlled substances, including monitoring INSPECT reports, urine drug screening, and pill counts if necessary. The patient is aware that inappropriate use will results in cessation of prescribing such medications.    INSPECT report has been reviewed and scanned into the patient's chart.

## 2021-04-29 ENCOUNTER — APPOINTMENT (OUTPATIENT)
Dept: PAIN MEDICINE | Facility: CLINIC | Age: 71
End: 2021-04-29

## 2021-05-04 NOTE — PROGRESS NOTES
Addended by: DESHAWN JANSEN on: 5/4/2021 11:32 AM     Modules accepted: Orders     Potassium is normal. He's only on torsemide now, right? Stopped bumetanide and potassium?  How is the torsemide doing with keeping the fluid/swelling down?

## 2021-05-07 RX ORDER — POTASSIUM CHLORIDE 1500 MG/1
TABLET, EXTENDED RELEASE ORAL
Qty: 60 TABLET | Refills: 0 | Status: SHIPPED | OUTPATIENT
Start: 2021-05-07 | End: 2021-06-07

## 2021-05-10 ENCOUNTER — CLINICAL SUPPORT (OUTPATIENT)
Dept: FAMILY MEDICINE CLINIC | Facility: CLINIC | Age: 71
End: 2021-05-10

## 2021-05-10 DIAGNOSIS — E29.1 DEFICIENCY OF TESTOSTERONE BIOSYNTHESIS: ICD-10-CM

## 2021-05-10 PROCEDURE — 96372 THER/PROPH/DIAG INJ SC/IM: CPT | Performed by: NURSE PRACTITIONER

## 2021-05-10 RX ADMIN — TESTOSTERONE CYPIONATE 200 MG: 200 INJECTION, SOLUTION INTRAMUSCULAR at 08:57

## 2021-05-24 ENCOUNTER — CLINICAL SUPPORT (OUTPATIENT)
Dept: FAMILY MEDICINE CLINIC | Facility: CLINIC | Age: 71
End: 2021-05-24

## 2021-05-24 DIAGNOSIS — E29.1 HYPOGONADISM IN MALE: ICD-10-CM

## 2021-05-24 PROCEDURE — 96372 THER/PROPH/DIAG INJ SC/IM: CPT | Performed by: NURSE PRACTITIONER

## 2021-05-24 RX ORDER — TAMSULOSIN HYDROCHLORIDE 0.4 MG/1
CAPSULE ORAL
Qty: 90 CAPSULE | Refills: 0 | Status: SHIPPED | OUTPATIENT
Start: 2021-05-24 | End: 2021-05-26

## 2021-05-24 RX ADMIN — TESTOSTERONE CYPIONATE 200 MG: 200 INJECTION, SOLUTION INTRAMUSCULAR at 09:10

## 2021-05-26 RX ORDER — TAMSULOSIN HYDROCHLORIDE 0.4 MG/1
CAPSULE ORAL
Qty: 90 CAPSULE | Refills: 0 | Status: SHIPPED | OUTPATIENT
Start: 2021-05-26 | End: 2021-11-15

## 2021-06-07 ENCOUNTER — CLINICAL SUPPORT (OUTPATIENT)
Dept: FAMILY MEDICINE CLINIC | Facility: CLINIC | Age: 71
End: 2021-06-07

## 2021-06-07 DIAGNOSIS — E29.1 DEFICIENCY OF TESTOSTERONE BIOSYNTHESIS: ICD-10-CM

## 2021-06-07 PROCEDURE — 96372 THER/PROPH/DIAG INJ SC/IM: CPT | Performed by: NURSE PRACTITIONER

## 2021-06-07 RX ORDER — POTASSIUM CHLORIDE 1500 MG/1
TABLET, EXTENDED RELEASE ORAL
Qty: 180 TABLET | Refills: 0 | Status: SHIPPED | OUTPATIENT
Start: 2021-06-07 | End: 2021-11-16

## 2021-06-07 RX ADMIN — TESTOSTERONE CYPIONATE 200 MG: 200 INJECTION, SOLUTION INTRAMUSCULAR at 09:38

## 2021-06-10 ENCOUNTER — OFFICE VISIT (OUTPATIENT)
Dept: FAMILY MEDICINE CLINIC | Facility: CLINIC | Age: 71
End: 2021-06-10

## 2021-06-10 ENCOUNTER — LAB (OUTPATIENT)
Dept: FAMILY MEDICINE CLINIC | Facility: CLINIC | Age: 71
End: 2021-06-10

## 2021-06-10 VITALS
SYSTOLIC BLOOD PRESSURE: 144 MMHG | TEMPERATURE: 97.6 F | DIASTOLIC BLOOD PRESSURE: 70 MMHG | BODY MASS INDEX: 29.53 KG/M2 | RESPIRATION RATE: 18 BRPM | OXYGEN SATURATION: 98 % | HEIGHT: 72 IN | HEART RATE: 70 BPM | WEIGHT: 218 LBS

## 2021-06-10 DIAGNOSIS — Z95.1 S/P CABG X 5: ICD-10-CM

## 2021-06-10 DIAGNOSIS — I25.5 ISCHEMIC CARDIOMYOPATHY: ICD-10-CM

## 2021-06-10 DIAGNOSIS — E78.2 MIXED HYPERLIPIDEMIA: ICD-10-CM

## 2021-06-10 DIAGNOSIS — E11.65 TYPE 2 DIABETES MELLITUS WITH HYPERGLYCEMIA, WITHOUT LONG-TERM CURRENT USE OF INSULIN (HCC): Primary | ICD-10-CM

## 2021-06-10 DIAGNOSIS — I10 ESSENTIAL HYPERTENSION: ICD-10-CM

## 2021-06-10 DIAGNOSIS — M05.79 RHEUMATOID ARTHRITIS INVOLVING MULTIPLE SITES WITH POSITIVE RHEUMATOID FACTOR (HCC): ICD-10-CM

## 2021-06-10 DIAGNOSIS — E11.65 TYPE 2 DIABETES MELLITUS WITH HYPERGLYCEMIA, WITHOUT LONG-TERM CURRENT USE OF INSULIN (HCC): ICD-10-CM

## 2021-06-10 PROBLEM — J42 CHRONIC BRONCHITIS, UNSPECIFIED CHRONIC BRONCHITIS TYPE: Status: RESOLVED | Noted: 2021-06-10 | Resolved: 2021-06-10

## 2021-06-10 PROBLEM — I11.0 HYPERTENSIVE HEART DISEASE WITH HEART FAILURE: Status: ACTIVE | Noted: 2021-06-10

## 2021-06-10 PROBLEM — J42 CHRONIC BRONCHITIS, UNSPECIFIED CHRONIC BRONCHITIS TYPE (HCC): Status: ACTIVE | Noted: 2021-06-10

## 2021-06-10 PROBLEM — I11.0 HYPERTENSIVE HEART DISEASE WITH HEART FAILURE: Status: RESOLVED | Noted: 2021-06-10 | Resolved: 2021-06-10

## 2021-06-10 LAB
ALBUMIN SERPL-MCNC: 4.1 G/DL (ref 3.5–5.2)
ALBUMIN/GLOB SERPL: 1.5 G/DL
ALP SERPL-CCNC: 61 U/L (ref 39–117)
ALT SERPL W P-5'-P-CCNC: 32 U/L (ref 1–41)
ANION GAP SERPL CALCULATED.3IONS-SCNC: 9.3 MMOL/L (ref 5–15)
AST SERPL-CCNC: 30 U/L (ref 1–40)
BILIRUB SERPL-MCNC: 0.3 MG/DL (ref 0–1.2)
BUN SERPL-MCNC: 16 MG/DL (ref 8–23)
BUN/CREAT SERPL: 15.7 (ref 7–25)
CALCIUM SPEC-SCNC: 9.1 MG/DL (ref 8.6–10.5)
CHLORIDE SERPL-SCNC: 100 MMOL/L (ref 98–107)
CO2 SERPL-SCNC: 27.7 MMOL/L (ref 22–29)
CREAT SERPL-MCNC: 1.02 MG/DL (ref 0.76–1.27)
GFR SERPL CREATININE-BSD FRML MDRD: 72 ML/MIN/1.73
GLOBULIN UR ELPH-MCNC: 2.7 GM/DL
GLUCOSE SERPL-MCNC: 117 MG/DL (ref 65–99)
HBA1C MFR BLD: 6.5 % (ref 3.5–5.6)
POTASSIUM SERPL-SCNC: 3.8 MMOL/L (ref 3.5–5.2)
PROT SERPL-MCNC: 6.8 G/DL (ref 6–8.5)
SODIUM SERPL-SCNC: 137 MMOL/L (ref 136–145)

## 2021-06-10 PROCEDURE — 36415 COLL VENOUS BLD VENIPUNCTURE: CPT

## 2021-06-10 PROCEDURE — 83036 HEMOGLOBIN GLYCOSYLATED A1C: CPT | Performed by: NURSE PRACTITIONER

## 2021-06-10 PROCEDURE — 80053 COMPREHEN METABOLIC PANEL: CPT | Performed by: NURSE PRACTITIONER

## 2021-06-10 PROCEDURE — 99214 OFFICE O/P EST MOD 30 MIN: CPT | Performed by: NURSE PRACTITIONER

## 2021-06-10 NOTE — PROGRESS NOTES
A1C went up slightly to 6.5. I will give him 6 months to work on diet and lifestyle changes to try to get this down before considering meds. Lets have him follow up in6 months for repeat labs.

## 2021-06-10 NOTE — ASSESSMENT & PLAN NOTE
Will repeat A1C again today. Most recent check was 6.3. pt is avoiding medication therapy. He would like to work on lifestyle changes.

## 2021-06-10 NOTE — PROGRESS NOTES
"Chief Complaint  Diabetes    Subjective          Arik August presents to [facility]  Pt comes in today for follow up on medications. Pt has hx of CAD, DM, hypothroidism, RA, OA, gout, psuedogout, chronic back pain    DM: he has been trying to control this with diet and exercise. Unfortunately he hasn't been doing well with that. Has chronic back pain. Has had 5 surgery, most recently in Oct 2020. Has RA and chornic joint pain. Unable to do much as far as exercise. He is in PT. Works on stretches.   Also admits that he likes his sweets. Not monitoring BS. Trying to avoid taking meds for this.     HTN: controlled with lisinopril and metoprolol. He is with c/o dizziness when he stands up. Doesn't last long.     Hypothyroidism: maintained on synthroid 100mcg    Sees rheumatology every 3 months for RA, gout, psuedogout. Currently on allopurinol, colchicine, methotrexate.   Sees Dr. Terry (surgeon) most recent surgery was 10/2020. Had hardware removed and replaced and fusion. Cervical ->lumbar spine.   Sees pain management: maintained on percocet and baclofen. Was recently cleared to restart diclofenac.     Having some balance issues. Using cane. Working with PT on this.          Objective     Vital Signs:   /70   Pulse 70   Temp 97.6 °F (36.4 °C)   Resp 18   Ht 182.9 cm (72\")   Wt 98.9 kg (218 lb)   SpO2 98%   BMI 29.57 kg/m²       BP Readings from Last 3 Encounters:   06/10/21 144/70   03/18/21 148/80   03/05/21 128/74       Wt Readings from Last 3 Encounters:   06/10/21 98.9 kg (218 lb)   03/18/21 98.4 kg (217 lb)   03/05/21 97.5 kg (215 lb)       Physical Exam  Constitutional:       Appearance: He is well-developed.   HENT:      Head: Normocephalic.   Eyes:      Pupils: Pupils are equal, round, and reactive to light.   Cardiovascular:      Rate and Rhythm: Normal rate and regular rhythm.   Pulmonary:      Effort: Pulmonary effort is normal. No respiratory distress.      " Breath sounds: Normal breath sounds.   Musculoskeletal:      Right hand: Deformity present.      Left hand: Deformity present.      Comments: RA changes in justyna hands.     Neurological:      Mental Status: He is alert and oriented to person, place, and time.   Psychiatric:         Mood and Affect: Mood normal.         Behavior: Behavior normal.          Result Review :   The following data was reviewed by: ANTONIA Salas on 06/10/2021:  Common labs    Common Labsle 1/25/21 1/25/21 1/25/21 3/15/21 3/15/21 3/15/21 3/15/21 3/15/21 4/6/21 4/6/21 4/6/21 4/6/21    0855 0855 0855 0845 0845 0845 0845 0845 1020 1020 1020 1020   Glucose      178 (A)         BUN      20         Creatinine      1.14         eGFR Non  Am      64         Sodium      138         Potassium      5.0         Chloride      96 (A)         Calcium      9.1         Albumin      4.20         Total Bilirubin      0.4         Alkaline Phosphatase      77         AST (SGOT)  39    24    43     ALT (SGPT) 25     24     34    WBC   8.89 6.68     8.26      Hemoglobin   15.3 16.0     16.0      Hematocrit   49.1 48.7     48.9      Platelets   325 265     274      Hemoglobin A1C        6.3 (A)       Microalbumin, Urine       1.6        Uric Acid     5.2       5.4   (A) Abnormal value            A1C Last 3 Results    HGBA1C Last 3 Results 7/20/20 12/1/20 3/15/21   Hemoglobin A1C 6.7 (A) 6.2 (A) 6.3 (A)   (A) Abnormal value                        Assessment and Plan      Diagnoses and all orders for this visit:    1. Type 2 diabetes mellitus with hyperglycemia, without long-term current use of insulin (CMS/[facility]) (Primary)  Assessment & Plan:  Will repeat A1C again today. Most recent check was 6.3. pt is avoiding medication therapy. He would like to work on lifestyle changes.     Orders:  -     Comprehensive Metabolic Panel; Future  -     Hemoglobin A1c; Future    2. Essential hypertension  Assessment & Plan:  Hypertension is unchanged.  Continue current  treatment regimen.  Blood pressure will be reassessed in 3 months.      3. S/P CABG x 5  Assessment & Plan:  Unchanged. Sees Dr. Gardiner for cardiology       4. Ischemic cardiomyopathy  Assessment & Plan:  Unchanged.       5. Mixed hyperlipidemia  Assessment & Plan:  Maintained on crestor       6. Rheumatoid arthritis involving multiple sites with positive rheumatoid factor (CMS/[facility])  Assessment & Plan:  Sees rheumatology q 3months.       Check labs today  We discussed starting medication therapy for DM if A1C still elevated  Cont PT    I spent 33 minutes caring for Arik on this date of service. This time includes time spent by me in the following activities:preparing for the visit, reviewing tests, obtaining and/or reviewing a separately obtained history, performing a medically appropriate examination and/or evaluation , counseling and educating the patient/family/caregiver, ordering medications, tests, or procedures and documenting information in the medical record    Follow Up   Return in about 3 months (around 9/10/2021).  Patient was given instructions and counseling regarding his condition or for health maintenance advice. Please see specific information pulled into the AVS if appropriate.

## 2021-06-11 DIAGNOSIS — E11.65 TYPE 2 DIABETES MELLITUS WITH HYPERGLYCEMIA, WITHOUT LONG-TERM CURRENT USE OF INSULIN (HCC): Primary | ICD-10-CM

## 2021-06-21 ENCOUNTER — CLINICAL SUPPORT (OUTPATIENT)
Dept: FAMILY MEDICINE CLINIC | Facility: CLINIC | Age: 71
End: 2021-06-21

## 2021-06-21 DIAGNOSIS — E03.9 HYPOTHYROIDISM, UNSPECIFIED TYPE: Primary | ICD-10-CM

## 2021-06-21 PROCEDURE — 96372 THER/PROPH/DIAG INJ SC/IM: CPT | Performed by: NURSE PRACTITIONER

## 2021-06-21 RX ORDER — ALLOPURINOL 300 MG/1
TABLET ORAL
Qty: 90 TABLET | Refills: 0 | Status: SHIPPED | OUTPATIENT
Start: 2021-06-21 | End: 2021-09-16

## 2021-06-21 RX ADMIN — TESTOSTERONE CYPIONATE 200 MG: 200 INJECTION, SOLUTION INTRAMUSCULAR at 09:25

## 2021-06-25 NOTE — TELEPHONE ENCOUNTER
.    Caller: Arik August    Relationship: Self    Best call back number: [phone]    Medication needed:   Requested Prescriptions     Pending Prescriptions Disp Refills   • rosuvastatin (CRESTOR) 20 MG tablet 90 tablet 1     Sig: Take 1 tablet by mouth Daily.       When do you need the refill by: 6/25/21    What additional details did the patient provide when requesting the medication: COMPLETELY OUT    Does the patient have less than a 3 day supply:  [x] Yes  [] No    What is the patient's preferred pharmacy: [facility] [address] [place] [address], [place] - [address] [facility] [phone] [facility] [phone] FX

## 2021-06-27 RX ORDER — ROSUVASTATIN CALCIUM 20 MG/1
20 TABLET, COATED ORAL DAILY
Qty: 90 TABLET | Refills: 1 | Status: SHIPPED | OUTPATIENT
Start: 2021-06-27 | End: 2021-12-12

## 2021-07-06 ENCOUNTER — CLINICAL SUPPORT (OUTPATIENT)
Dept: FAMILY MEDICINE CLINIC | Facility: CLINIC | Age: 71
End: 2021-07-06

## 2021-07-06 DIAGNOSIS — E29.1 DEFICIENCY OF TESTOSTERONE BIOSYNTHESIS: ICD-10-CM

## 2021-07-06 PROCEDURE — 96372 THER/PROPH/DIAG INJ SC/IM: CPT | Performed by: NURSE PRACTITIONER

## 2021-07-06 RX ORDER — BUMETANIDE 2 MG/1
TABLET ORAL
Qty: 180 TABLET | Refills: 0 | Status: SHIPPED | OUTPATIENT
Start: 2021-07-06 | End: 2022-03-14

## 2021-07-06 RX ADMIN — TESTOSTERONE CYPIONATE 200 MG: 200 INJECTION, SOLUTION INTRAMUSCULAR at 08:38

## 2021-07-12 RX ORDER — LISINOPRIL 5 MG/1
TABLET ORAL
Qty: 90 TABLET | Refills: 0 | Status: SHIPPED | OUTPATIENT
Start: 2021-07-12 | End: 2021-10-04

## 2021-07-12 RX ORDER — LEVOTHYROXINE SODIUM 0.1 MG/1
TABLET ORAL
Qty: 90 TABLET | Refills: 0 | Status: SHIPPED | OUTPATIENT
Start: 2021-07-12 | End: 2021-10-11

## 2021-07-19 ENCOUNTER — CLINICAL SUPPORT (OUTPATIENT)
Dept: FAMILY MEDICINE CLINIC | Facility: CLINIC | Age: 71
End: 2021-07-19

## 2021-07-19 DIAGNOSIS — E29.1 DEFICIENCY OF TESTOSTERONE BIOSYNTHESIS: ICD-10-CM

## 2021-07-19 PROCEDURE — 96372 THER/PROPH/DIAG INJ SC/IM: CPT | Performed by: NURSE PRACTITIONER

## 2021-07-19 RX ADMIN — TESTOSTERONE CYPIONATE 200 MG: 200 INJECTION, SOLUTION INTRAMUSCULAR at 09:32

## 2021-08-02 ENCOUNTER — CLINICAL SUPPORT (OUTPATIENT)
Dept: FAMILY MEDICINE CLINIC | Facility: CLINIC | Age: 71
End: 2021-08-02

## 2021-08-02 DIAGNOSIS — E29.1 HYPOGONADISM IN MALE: ICD-10-CM

## 2021-08-02 PROCEDURE — 96372 THER/PROPH/DIAG INJ SC/IM: CPT | Performed by: NURSE PRACTITIONER

## 2021-08-02 RX ADMIN — TESTOSTERONE CYPIONATE 200 MG: 200 INJECTION, SOLUTION INTRAMUSCULAR at 08:49

## 2021-08-12 RX ORDER — DICLOFENAC SODIUM 75 MG/1
75 TABLET, DELAYED RELEASE ORAL 2 TIMES DAILY PRN
Qty: 180 TABLET | Refills: 0 | Status: SHIPPED | OUTPATIENT
Start: 2021-08-12 | End: 2021-11-15

## 2021-08-16 ENCOUNTER — CLINICAL SUPPORT (OUTPATIENT)
Dept: FAMILY MEDICINE CLINIC | Facility: CLINIC | Age: 71
End: 2021-08-16

## 2021-08-16 DIAGNOSIS — E29.1 HYPOTESTOSTERONEMIA IN MALE: ICD-10-CM

## 2021-08-16 DIAGNOSIS — E29.1 DEFICIENCY OF TESTOSTERONE BIOSYNTHESIS: ICD-10-CM

## 2021-08-16 PROCEDURE — 96372 THER/PROPH/DIAG INJ SC/IM: CPT | Performed by: NURSE PRACTITIONER

## 2021-08-16 RX ADMIN — TESTOSTERONE CYPIONATE 200 MG: 200 INJECTION, SOLUTION INTRAMUSCULAR at 09:56

## 2021-08-30 ENCOUNTER — CLINICAL SUPPORT (OUTPATIENT)
Dept: FAMILY MEDICINE CLINIC | Facility: CLINIC | Age: 71
End: 2021-08-30

## 2021-08-30 DIAGNOSIS — E29.1 HYPOGONADISM MALE: ICD-10-CM

## 2021-08-30 PROCEDURE — 96372 THER/PROPH/DIAG INJ SC/IM: CPT | Performed by: NURSE PRACTITIONER

## 2021-08-30 RX ADMIN — TESTOSTERONE CYPIONATE 200 MG: 200 INJECTION, SOLUTION INTRAMUSCULAR at 09:08

## 2021-09-03 ENCOUNTER — LAB (OUTPATIENT)
Dept: FAMILY MEDICINE CLINIC | Facility: CLINIC | Age: 71
End: 2021-09-03

## 2021-09-03 DIAGNOSIS — E11.65 TYPE 2 DIABETES MELLITUS WITH HYPERGLYCEMIA, WITHOUT LONG-TERM CURRENT USE OF INSULIN (HCC): ICD-10-CM

## 2021-09-03 LAB
ALBUMIN SERPL-MCNC: 4.1 G/DL (ref 3.5–5.2)
ALBUMIN/GLOB SERPL: 1.6 G/DL
ALP SERPL-CCNC: 61 U/L (ref 39–117)
ALT SERPL W P-5'-P-CCNC: 32 U/L (ref 1–41)
ANION GAP SERPL CALCULATED.3IONS-SCNC: 8 MMOL/L (ref 5–15)
AST SERPL-CCNC: 28 U/L (ref 1–40)
BILIRUB SERPL-MCNC: 0.3 MG/DL (ref 0–1.2)
BUN SERPL-MCNC: 21 MG/DL (ref 8–23)
BUN/CREAT SERPL: 22.8 (ref 7–25)
CALCIUM SPEC-SCNC: 9.1 MG/DL (ref 8.6–10.5)
CHLORIDE SERPL-SCNC: 103 MMOL/L (ref 98–107)
CO2 SERPL-SCNC: 26 MMOL/L (ref 22–29)
CREAT SERPL-MCNC: 0.92 MG/DL (ref 0.76–1.27)
GFR SERPL CREATININE-BSD FRML MDRD: 81 ML/MIN/1.73
GLOBULIN UR ELPH-MCNC: 2.6 GM/DL
GLUCOSE SERPL-MCNC: 90 MG/DL (ref 65–99)
HBA1C MFR BLD: 5.9 % (ref 3.5–5.6)
POTASSIUM SERPL-SCNC: 4.3 MMOL/L (ref 3.5–5.2)
PROT SERPL-MCNC: 6.7 G/DL (ref 6–8.5)
SODIUM SERPL-SCNC: 137 MMOL/L (ref 136–145)

## 2021-09-03 PROCEDURE — 36415 COLL VENOUS BLD VENIPUNCTURE: CPT

## 2021-09-03 PROCEDURE — 80053 COMPREHEN METABOLIC PANEL: CPT | Performed by: NURSE PRACTITIONER

## 2021-09-03 PROCEDURE — 83036 HEMOGLOBIN GLYCOSYLATED A1C: CPT | Performed by: NURSE PRACTITIONER

## 2021-09-13 ENCOUNTER — CLINICAL SUPPORT (OUTPATIENT)
Dept: FAMILY MEDICINE CLINIC | Facility: CLINIC | Age: 71
End: 2021-09-13

## 2021-09-13 DIAGNOSIS — E29.1 HYPOTESTOSTERONEMIA IN MALE: Primary | ICD-10-CM

## 2021-09-13 PROCEDURE — 96372 THER/PROPH/DIAG INJ SC/IM: CPT | Performed by: NURSE PRACTITIONER

## 2021-09-13 RX ADMIN — TESTOSTERONE CYPIONATE 200 MG: 200 INJECTION, SOLUTION INTRAMUSCULAR at 08:58

## 2021-09-16 RX ORDER — ALLOPURINOL 300 MG/1
TABLET ORAL
Qty: 90 TABLET | Refills: 0 | Status: SHIPPED | OUTPATIENT
Start: 2021-09-16 | End: 2021-11-22

## 2021-09-22 ENCOUNTER — OFFICE VISIT (OUTPATIENT)
Dept: FAMILY MEDICINE CLINIC | Facility: CLINIC | Age: 71
End: 2021-09-22

## 2021-09-22 VITALS
TEMPERATURE: 98.2 F | HEART RATE: 88 BPM | HEIGHT: 72 IN | DIASTOLIC BLOOD PRESSURE: 80 MMHG | BODY MASS INDEX: 28.31 KG/M2 | WEIGHT: 209 LBS | OXYGEN SATURATION: 95 % | SYSTOLIC BLOOD PRESSURE: 154 MMHG

## 2021-09-22 DIAGNOSIS — E11.65 TYPE 2 DIABETES MELLITUS WITH HYPERGLYCEMIA, WITHOUT LONG-TERM CURRENT USE OF INSULIN (HCC): Primary | ICD-10-CM

## 2021-09-22 DIAGNOSIS — E11.40 TYPE 2 DIABETES MELLITUS WITH DIABETIC NEUROPATHY, WITHOUT LONG-TERM CURRENT USE OF INSULIN (HCC): ICD-10-CM

## 2021-09-22 DIAGNOSIS — I10 ESSENTIAL HYPERTENSION: ICD-10-CM

## 2021-09-22 DIAGNOSIS — M05.79 RHEUMATOID ARTHRITIS INVOLVING MULTIPLE SITES WITH POSITIVE RHEUMATOID FACTOR (HCC): ICD-10-CM

## 2021-09-22 PROCEDURE — 99214 OFFICE O/P EST MOD 30 MIN: CPT | Performed by: NURSE PRACTITIONER

## 2021-09-22 RX ORDER — PANTOPRAZOLE SODIUM 40 MG/1
40 TABLET, DELAYED RELEASE ORAL DAILY
Qty: 90 TABLET | Refills: 2 | Status: SHIPPED | OUTPATIENT
Start: 2021-09-22 | End: 2022-01-17

## 2021-09-22 RX ORDER — PREGABALIN 50 MG/1
50 CAPSULE ORAL 2 TIMES DAILY
Qty: 60 CAPSULE | Refills: 2 | Status: SHIPPED | OUTPATIENT
Start: 2021-09-22 | End: 2022-01-17 | Stop reason: SDUPTHER

## 2021-09-22 NOTE — PROGRESS NOTES
"Chief Complaint  3 month follow up  Subjective        Arik August presents to [facility]  Pt comes in today for follow up on DM. In June his A1C was 6.5, and just recently decreased to 5.9 after making some diet changes. Pt is not wanting to take meds, would like try and manage it with diet and exercise.   States he has eliminated sugars and eating more vegetables.        Objective     Vital Signs:   /80 (BP Location: Left arm, Patient Position: Sitting, Cuff Size: Adult)   Pulse 88   Temp 98.2 °F (36.8 °C) (Skin)   Ht 182.9 cm (72\")   Wt 94.8 kg (209 lb)   SpO2 95%   BMI 28.35 kg/m²       BP Readings from Last 3 Encounters:   09/22/21 154/80   06/10/21 144/70   03/18/21 148/80       Wt Readings from Last 3 Encounters:   09/22/21 94.8 kg (209 lb)   06/10/21 98.9 kg (218 lb)   03/18/21 98.4 kg (217 lb)     Physical Exam  Constitutional:       Appearance: He is well-developed.   Eyes:      Pupils: Pupils are equal, round, and reactive to light.   Cardiovascular:      Rate and Rhythm: Normal rate and regular rhythm.   Pulmonary:      Effort: Pulmonary effort is normal.      Breath sounds: Normal breath sounds.   Musculoskeletal:      Comments: RA changes    Neurological:      Mental Status: He is alert and oriented to person, place, and time.        Result Review :                 Assessment and Plan    Diagnoses and all orders for this visit:    1. Type 2 diabetes mellitus with hyperglycemia, without long-term current use of insulin (CMS/[facility]) (Primary)  Assessment & Plan:  Diabetes is improving with lifestyle modifications.   Continue current treatment regimen.  Diabetes will be reassessed in 6 months.  A1C is improved and down to 5.9 with making diet changes. Will cont to work on lifestyle changes.       2. Type 2 diabetes mellitus with diabetic neuropathy, without long-term current use of insulin (CMS/HCC)  -     pregabalin (Lyrica) 50 MG capsule; Take 1 capsule by " mouth 2 (Two) Times a Day.  Dispense: 60 capsule; Refill: 2    3. Rheumatoid arthritis involving multiple sites with positive rheumatoid factor (CMS/HCC)  Assessment & Plan:  Sees rheum and pain management. Has been having a lot of increased pain lately 2/2 back, RA, and DPN. He is going to discuss this with them, but at this time I am going to start and trial lyrica. He has failed gabapentin in the past.       4. Essential hypertension  Assessment & Plan:  Hypertension is unchanged.  Continue current treatment regimen.  Blood pressure will be reassessed at the next regular appointment.  Repeat /84. Will cont to monitor closely and cont same regimen.       Other orders  -     pantoprazole (PROTONIX) 40 MG EC tablet; Take 1 tablet by mouth Daily.  Dispense: 90 tablet; Refill: 2    Will start lyrica. Pt is going to call me in 1 month with update. If tolerating it well, will increase dose   Follow up with pain management and rheum   Monitor BP and keep diary  During this office visit, we discussed the pertinent aspects of the visit and treatment recommendations. Pt verbalizes understanding. Follow up was discussed. Patient was given the opportunity to ask questions and discuss other concerns.         Follow Up   Return in about 6 months (around 3/22/2022) for HTN follow up.  Patient was given instructions and counseling regarding his condition or for health maintenance advice. Please see specific information pulled into the AVS if appropriate.

## 2021-09-22 NOTE — ASSESSMENT & PLAN NOTE
Sees rheum and pain management. Has been having a lot of increased pain lately 2/2 back, RA, and DPN. He is going to discuss this with them, but at this time I am going to start and trial lyrica. He has failed gabapentin in the past.

## 2021-09-22 NOTE — ASSESSMENT & PLAN NOTE
Hypertension is unchanged.  Continue current treatment regimen.  Blood pressure will be reassessed at the next regular appointment.  Repeat /84. Will cont to monitor closely and cont same regimen.

## 2021-09-22 NOTE — ASSESSMENT & PLAN NOTE
Diabetes is improving with lifestyle modifications.   Continue current treatment regimen.  Diabetes will be reassessed in 6 months.  A1C is improved and down to 5.9 with making diet changes. Will cont to work on lifestyle changes.

## 2021-09-27 ENCOUNTER — CLINICAL SUPPORT (OUTPATIENT)
Dept: FAMILY MEDICINE CLINIC | Facility: CLINIC | Age: 71
End: 2021-09-27

## 2021-09-27 DIAGNOSIS — E29.1 TESTICULAR INSUFFICIENCY: ICD-10-CM

## 2021-09-27 PROCEDURE — 96372 THER/PROPH/DIAG INJ SC/IM: CPT | Performed by: NURSE PRACTITIONER

## 2021-09-27 RX ADMIN — TESTOSTERONE CYPIONATE 200 MG: 200 INJECTION, SOLUTION INTRAMUSCULAR at 11:06

## 2021-10-04 RX ORDER — LISINOPRIL 5 MG/1
TABLET ORAL
Qty: 90 TABLET | Refills: 0 | Status: SHIPPED | OUTPATIENT
Start: 2021-10-04 | End: 2021-11-22

## 2021-10-11 ENCOUNTER — CLINICAL SUPPORT (OUTPATIENT)
Dept: FAMILY MEDICINE CLINIC | Facility: CLINIC | Age: 71
End: 2021-10-11

## 2021-10-11 DIAGNOSIS — E29.1 TESTICULAR INSUFFICIENCY: ICD-10-CM

## 2021-10-11 DIAGNOSIS — E29.1 TESTOSTERONE DEFICIENCY IN MALE: ICD-10-CM

## 2021-10-11 PROCEDURE — 96372 THER/PROPH/DIAG INJ SC/IM: CPT | Performed by: NURSE PRACTITIONER

## 2021-10-11 RX ORDER — LEVOTHYROXINE SODIUM 0.1 MG/1
TABLET ORAL
Qty: 90 TABLET | Refills: 0 | Status: SHIPPED | OUTPATIENT
Start: 2021-10-11 | End: 2022-01-06

## 2021-10-11 RX ADMIN — TESTOSTERONE CYPIONATE 200 MG: 200 INJECTION, SOLUTION INTRAMUSCULAR at 11:36

## 2021-10-18 ENCOUNTER — TELEPHONE (OUTPATIENT)
Dept: FAMILY MEDICINE CLINIC | Facility: CLINIC | Age: 71
End: 2021-10-18

## 2021-10-18 NOTE — TELEPHONE ENCOUNTER
Caller: Wilman August    Relationship: Self    Best call back number: [phone]    Which medication are you concerned about: pregabalin (Lyrica) 50 MG capsule    Who prescribed you this medication: OJ DELGADO    What are your concerns: IVA LILLY [facility] WOULD LIKE TO TAKE OVER THE SCRIPT SINCE HE SEES [facility] MONTHLY BUT PATIENT WOULD LIKE TO SPEAK WITH SILVER DELGADO FOR ADVICE [facility]

## 2021-10-25 ENCOUNTER — CLINICAL SUPPORT (OUTPATIENT)
Dept: FAMILY MEDICINE CLINIC | Facility: CLINIC | Age: 71
End: 2021-10-25

## 2021-10-25 DIAGNOSIS — E29.1 DEFICIENCY OF TESTOSTERONE BIOSYNTHESIS: ICD-10-CM

## 2021-10-25 PROCEDURE — 96372 THER/PROPH/DIAG INJ SC/IM: CPT | Performed by: NURSE PRACTITIONER

## 2021-10-25 RX ADMIN — TESTOSTERONE CYPIONATE 200 MG: 200 INJECTION, SOLUTION INTRAMUSCULAR at 08:59

## 2021-11-09 ENCOUNTER — CLINICAL SUPPORT (OUTPATIENT)
Dept: FAMILY MEDICINE CLINIC | Facility: CLINIC | Age: 71
End: 2021-11-09

## 2021-11-09 DIAGNOSIS — E29.1 DEFICIENCY OF TESTOSTERONE BIOSYNTHESIS: ICD-10-CM

## 2021-11-09 PROCEDURE — 96372 THER/PROPH/DIAG INJ SC/IM: CPT | Performed by: NURSE PRACTITIONER

## 2021-11-09 RX ADMIN — TESTOSTERONE CYPIONATE 200 MG: 200 INJECTION, SOLUTION INTRAMUSCULAR at 09:34

## 2021-11-15 RX ORDER — DICLOFENAC SODIUM 75 MG/1
TABLET, DELAYED RELEASE ORAL
Qty: 180 TABLET | Refills: 0 | Status: SHIPPED | OUTPATIENT
Start: 2021-11-15 | End: 2022-02-14

## 2021-11-15 RX ORDER — TAMSULOSIN HYDROCHLORIDE 0.4 MG/1
CAPSULE ORAL
Qty: 90 CAPSULE | Refills: 0 | Status: SHIPPED | OUTPATIENT
Start: 2021-11-15 | End: 2022-02-14

## 2021-11-16 ENCOUNTER — TELEPHONE (OUTPATIENT)
Dept: FAMILY MEDICINE CLINIC | Facility: CLINIC | Age: 71
End: 2021-11-16

## 2021-11-16 RX ORDER — POTASSIUM CHLORIDE 1500 MG/1
TABLET, EXTENDED RELEASE ORAL
Qty: 60 TABLET | Refills: 1 | Status: SHIPPED | OUTPATIENT
Start: 2021-11-16 | End: 2022-01-13

## 2021-11-16 NOTE — TELEPHONE ENCOUNTER
FMLA PAPERS WERE DROPPED OFF MORE THAN 2 WEEKS AGO AND FAMILY HAS NOT HEARD ANYTHING BACK ABOUT THEM AND I SEE NO DOCUMENTATION IN THE CHART ABOUT IT.

## 2021-11-18 NOTE — TELEPHONE ENCOUNTER
WIFE STATED THAT THIS FORM IS FOR A HANDICAP TAG AND THAT [facility] DOES NOT NEED [place] PAPERS.    SHELLI WOULD LIKE A CALL AS SOON AS POSSIBLE WHEN READY FOR PICKUP -[phone]

## 2021-11-22 ENCOUNTER — OFFICE VISIT (OUTPATIENT)
Dept: CARDIOLOGY | Facility: CLINIC | Age: 71
End: 2021-11-22

## 2021-11-22 ENCOUNTER — CLINICAL SUPPORT (OUTPATIENT)
Dept: FAMILY MEDICINE CLINIC | Facility: CLINIC | Age: 71
End: 2021-11-22

## 2021-11-22 VITALS
BODY MASS INDEX: 29.23 KG/M2 | WEIGHT: 215.8 LBS | RESPIRATION RATE: 18 BRPM | HEART RATE: 90 BPM | DIASTOLIC BLOOD PRESSURE: 72 MMHG | SYSTOLIC BLOOD PRESSURE: 128 MMHG | HEIGHT: 72 IN

## 2021-11-22 DIAGNOSIS — E29.1 DEFICIENCY OF TESTOSTERONE BIOSYNTHESIS: ICD-10-CM

## 2021-11-22 DIAGNOSIS — I73.9 CLAUDICATION (HCC): ICD-10-CM

## 2021-11-22 DIAGNOSIS — R07.89 CHEST PAIN, ATYPICAL: Primary | ICD-10-CM

## 2021-11-22 PROCEDURE — 99204 OFFICE O/P NEW MOD 45 MIN: CPT | Performed by: INTERNAL MEDICINE

## 2021-11-22 PROCEDURE — 96372 THER/PROPH/DIAG INJ SC/IM: CPT | Performed by: NURSE PRACTITIONER

## 2021-11-22 RX ORDER — METOPROLOL SUCCINATE 25 MG/1
25 TABLET, EXTENDED RELEASE ORAL 2 TIMES DAILY
Qty: 60 TABLET | Refills: 5 | Status: SHIPPED | OUTPATIENT
Start: 2021-11-22 | End: 2022-05-31 | Stop reason: SDUPTHER

## 2021-11-22 RX ORDER — SULFASALAZINE 500 MG/1
TABLET ORAL DAILY
COMMUNITY
Start: 2021-10-26 | End: 2022-01-17

## 2021-11-22 NOTE — PROGRESS NOTES
Subjective:     Encounter Date:11/22/2021      Patient ID: Arik August is a 71 y.o. male.    Chief Complaint:  Chief Complaint   Patient presents with   • Chest Pain       HPI:  History of Present Illness  I the pleasure to see this very pleasant 71-year-old gentleman today as a new patient.  He has a history multivessel bypass surgery ischemic cardiomyopathy last EF was 40% back in 2018 but no evaluation since this time.  He is on metoprolol tartrate and lisinopril which is not optimal by current guidelines and he should be on Entresto long-acting metoprolol loop diuretic and potentially Aldactone if blood pressure and renal function will tolerate.  He is on moderate to high intensity dose statin with Crestor.  His main complaint is lower extremity weakness he has multiple back surgeries but no history of any PVD or carotid disease but he is at high risk with ischemic heart disease.  He also complains of substernal chest pain waxing and waning over the last few days lasting minutes which feels very similar to his prior heart trouble pain he says no dizziness passing out a lasted minutes substernal without significant radiation was associated with some mild diaphoresis and shortness of breath.    Review of systems -14 point review of systems except was mentioned above    Historical data copied forward from previous encounters in EMR is unchanged    Exam  2+ pitting edema to the shins  Mild muscle wasting peripherally concerning for more neurogenic claudication  Cap refill is normal  Regular rate and rhythm no rubs gallops 196 systolic ejection murmur lower sternal border  Trace JVD mild HJR sitting upright  No neurologic deficits grossly  Soft nontender nondistended  Clear to auscultation    Assessment plan per my encounter  Multivessel CAD status post bypass surgery 2018  Recurrent chest pain concerning for angina very typical he says for his cardiac chest pain  Continue aspirin statin beta-blocker  Lexiscan  stress for risk stratification with repeat 2D echo will be ordered  We will consider Plavix addition to his aspirin therapy    Hypertension, presently controlled    Ischemic cardiomyopathy with systolic heart failure  Continue Bumex 2 mg daily  Stop lisinopril for 48 hours and start Entresto 24/26 1 tablet daily  We will consider Aldactone if blood pressure electrolytes will tolerate    Erectile dysfunction, on Cialis intermittently, no nitrates on board    Hyperlipidemia continue rosuvastatin 20 daily    Secondary prevention goals    Functionally weakness, get ABIs and segmental pressures rule out obstructive PVD    Back to clinic in 30 days    New patient to me with new problems above    Tristan Kumar MD, PhD            The following portions of the patient's history were reviewed and updated as appropriate: allergies, current medications, past family history, past medical history, past social history, past surgical history and problem list.    Problem List:  Patient Active Problem List   Diagnosis   • S/P CABG x 5   • Ischemic cardiomyopathy   • Deficiency of testosterone biosynthesis   • Coronary artery disease involving native coronary artery of native heart without angina pectoris   • Mixed hyperlipidemia   • Essential hypertension   • Type 2 diabetes mellitus with hyperglycemia, without long-term current use of insulin (HCC)   • Failed orthopedic implant (HCC)   • Lumbar post-laminectomy syndrome   • Myofascial pain   • Pseudarthrosis following spinal fusion   • Thoracic back pain   • Weakness of both legs   • Thoracic spine fracture (HCC)   • Pseudoarthrosis of lumbar spine   • Hypothyroidism   • Cubital tunnel syndrome   • Acute blood loss as cause of postoperative anemia   • Mechanical complication of orthopedic internal fixation device (HCC)   • Rheumatoid arthritis involving multiple sites with positive rheumatoid factor (HCC)       Past Medical History:  Past Medical History:   Diagnosis Date   •  3-vessel CAD 12/24/2018    Severe Distal Main Noted on Cardiac Cath w/High-Grade Obstruction Involving the LAD; S/p CABG 12/2018   • Arthritis    • Atelectasis 12/12/2018    Noted on CT Chest   • Carpal tunnel syndrome     S/p Bret Release   • Chest pain due to CAD ([facility]) 12/2018   • CHF (congestive heart failure) ([facility]) 2018   • Chronic back pain     x2 Lumbar Fusions   • Chronic neck pain     Hx Cervical Fusion   • DDD (degenerative disc disease), lumbar    • Dyspnea on exertion 12/2018   • Emphysema of lung ([facility]) 12/12/2018    Noted on CT Chest   • Encounter for general adult medical examination without abnormal findings 10/1/2013   • HFrEF (heart failure with reduced ejection fraction) ([facility]) 2/7/2019   • History of echocardiogram 12/21/18-BHF    EF 40%; Mild L Atrial Enlargement Measuring 4.3CM; Normal Root/No Effusion; Ischemic Cardiomyopathy Changes Noted; Mild MVR Noted   • History of EKG 12/20/18-BHF    Sinus Tachycardia Noted w/Abnormal Changes   • HLD (hyperlipidemia)    • Hypertension    • Hypothyroidism    • Iron deficiency anemia 12/26/2018-PO    w/Iron Transfusion   • Ischemic cardiomyopathy 12/21/2018    Noted on Echo   • LAD stenosis 12/24/2018    L Circumfrex @ 90% Narrowing w/Severe Blockage of Obtuse Marginal Branch and 99% Narrowing of Ostal Ramus INtermedius Branch   • Lung nodule 12/21/2018    Small Stable in R Noted on CT Chest   • Mild atrial enlargement, left 12/21/2018    Noted on Echo   • Mild mitral valve regurgitation 12/21/2018    Noted on Echo   • Orthopnea 12/2018   • Osteoarthritis     Severe All Over; Hx Several Surgeries; Takes Oxycodone Daily   • Pleural effusion, bilateral 12/12/18-Moderate & 12/21/18-Small    Noted on CT Chest   • Pulmonary edema 12/2018   • RCA occlusion ([facility]) 12/24/2018    Total Occlusion Noted on Cardiac Cath   • Sinus tachycardia 12/20/18-BHF    Noted on EKG   • SOB (shortness of breath) 12/2018       Past Surgical History:  Past Surgical History:   Procedure  Laterality Date   • ANKLE SURGERY Right    • CARDIAC CATHETERIZATION Left 18-[facility]    w/R Coronary Arteriography/Coronary Angiography--Dr. Gardiner--Severe Distal L Main Disease with 3V CAD w/High-Grade Obstruction Involving the LAD L Cirumflex Ramus   • CARPAL TUNNEL RELEASE Bilateral     x2   • CERVICAL FUSION      x1   • COLONOSCOPY      WNL   • CORONARY ARTERY BYPASS GRAFT  18-[facility]    Urgent x5 w/L Vein Grafting-Dr. Aragon   • ELBOW ARTHROSCOPY Right    • ENDOSCOPIC VEIN HARVEST  18-[facility]    R Lower Extremity-Dr. Aragon   • EXTERNAL EAR SURGERY Left    • KNEE ARTHROSCOPY Right     x3   • KNEE SURGERY Left    • LUMBAR FUSION      x2   • NASAL FRACTURE SURGERY     • OTHER SURGICAL HISTORY      R Forearm Surgery   • REPLACEMENT TOTAL KNEE Bilateral    • SHOULDER SURGERY Left    • SPINE SURGERY  2011   • SPINE SURGERY     • SPINE SURGERY  2017    x2    • SPINE SURGERY  2018   • SPINE SURGERY         Social History:  Social History     Socioeconomic History   • Marital status:      Spouse name: Melissa   • Number of children: 2   Tobacco Use   • Smoking status: Former Smoker     Packs/day: 1.50     Years: 15.00     Pack years: 22.50     Types: Cigarettes     Quit date: 1992     Years since quittin.5   • Smokeless tobacco: Never Used   Vaping Use   • Vaping Use: Never used   Substance and Sexual Activity   • Alcohol use: Yes     Comment: Occ Beer   • Drug use: No   • Sexual activity: Defer       Allergies:  No Known Allergies    Immunizations:  Immunization History   Administered Date(s) Administered   • COVID-19 (MODERNA) 1st, 2nd, 3rd Dose Only 2021   • Fluzone High Dose =>65 Years ([employer] ONLY) 2018, 2019   • Influenza, Unspecified 09/15/2020   • Pneumococcal Conjugate 13-Valent (PCV13) 09/15/2020   • Pneumococcal Polysaccharide (PPSV23) 2013, 2014, 2018   • Tdap 2021       ROS:  ROS       Objective:         /72 (BP Location: Left  "arm, Patient Position: Sitting)   Pulse 90   Resp 18   Ht 182.9 cm (72\")   Wt 97.9 kg (215 lb 12.8 oz)   BMI 29.27 kg/m²     Physical Exam    In-Office Procedure(s):  Procedures    ASCVD RIsk Score::  The 10-year ASCVD risk score (Lou SPEARS Jr., et al., 2013) is: 36%    Values used to calculate the score:      Age: 71 years      Sex: Male      Is Non- : No      Diabetic: Yes      Tobacco smoker: No      Systolic Blood Pressure: 128 mmHg      Is BP treated: Yes      HDL Cholesterol: 39 mg/dL      Total Cholesterol: 157 mg/dL    Recent Radiology:  Imaging Results (Most Recent)     None          Lab Review:   Lab on 09/03/2021   Component Date Value   • Glucose 09/03/2021 90    • BUN 09/03/2021 21    • Creatinine 09/03/2021 0.92    • Sodium 09/03/2021 137    • Potassium 09/03/2021 4.3    • Chloride 09/03/2021 103    • CO2 09/03/2021 26.0    • Calcium 09/03/2021 9.1    • Total Protein 09/03/2021 6.7    • Albumin 09/03/2021 4.10    • ALT (SGPT) 09/03/2021 32    • AST (SGOT) 09/03/2021 28    • Alkaline Phosphatase 09/03/2021 61    • Total Bilirubin 09/03/2021 0.3    • eGFR Non  Amer 09/03/2021 81    • Globulin 09/03/2021 2.6    • A/G Ratio 09/03/2021 1.6    • BUN/Creatinine Ratio 09/03/2021 22.8    • Anion Gap 09/03/2021 8.0    • Hemoglobin A1C 09/03/2021 5.9*   Lab on 06/10/2021   Component Date Value   • Glucose 06/10/2021 117*   • BUN 06/10/2021 16    • Creatinine 06/10/2021 1.02    • Sodium 06/10/2021 137    • Potassium 06/10/2021 3.8    • Chloride 06/10/2021 100    • CO2 06/10/2021 27.7    • Calcium 06/10/2021 9.1    • Total Protein 06/10/2021 6.8    • Albumin 06/10/2021 4.10    • ALT (SGPT) 06/10/2021 32    • AST (SGOT) 06/10/2021 30    • Alkaline Phosphatase 06/10/2021 61    • Total Bilirubin 06/10/2021 0.3    • eGFR Non  Amer 06/10/2021 72    • Globulin 06/10/2021 2.7    • A/G Ratio 06/10/2021 1.5    • BUN/Creatinine Ratio 06/10/2021 15.7    • Anion Gap 06/10/2021 9.3    • " Hemoglobin A1C 06/10/2021 6.5*          Tristan Kumar MD  11/22/21  .

## 2021-11-24 RX ADMIN — TESTOSTERONE CYPIONATE 200 MG: 200 INJECTION, SOLUTION INTRAMUSCULAR at 12:19

## 2021-12-01 ENCOUNTER — TELEPHONE (OUTPATIENT)
Dept: CARDIOLOGY | Facility: CLINIC | Age: 71
End: 2021-12-01

## 2021-12-01 ENCOUNTER — HOSPITAL ENCOUNTER (EMERGENCY)
Facility: HOSPITAL | Age: 71
Discharge: HOME | End: 2021-12-01
Attending: EMERGENCY MEDICINE | Admitting: EMERGENCY MEDICINE

## 2021-12-01 VITALS
BODY MASS INDEX: 28.96 KG/M2 | OXYGEN SATURATION: 95 % | HEART RATE: 86 BPM | SYSTOLIC BLOOD PRESSURE: 158 MMHG | DIASTOLIC BLOOD PRESSURE: 86 MMHG | WEIGHT: 213.85 LBS | HEIGHT: 72 IN | TEMPERATURE: 98.1 F | RESPIRATION RATE: 16 BRPM

## 2021-12-01 DIAGNOSIS — R04.0 EPISTAXIS: Primary | ICD-10-CM

## 2021-12-01 LAB
APTT PPP: 26.8 SECONDS (ref 24–31)
BASOPHILS # BLD AUTO: 0 10*3/MM3 (ref 0–0.2)
BASOPHILS NFR BLD AUTO: 0.6 % (ref 0–1.5)
DEPRECATED RDW RBC AUTO: 56 FL (ref 37–54)
EOSINOPHIL # BLD AUTO: 0.3 10*3/MM3 (ref 0–0.4)
EOSINOPHIL NFR BLD AUTO: 4.4 % (ref 0.3–6.2)
ERYTHROCYTE [DISTWIDTH] IN BLOOD BY AUTOMATED COUNT: 16.4 % (ref 12.3–15.4)
HCT VFR BLD AUTO: 45.1 % (ref 37.5–51)
HGB BLD-MCNC: 16 G/DL (ref 13–17.7)
HOLD SPECIMEN: NORMAL
HOLD SPECIMEN: NORMAL
INR PPP: 0.97 (ref 0.93–1.1)
LYMPHOCYTES # BLD AUTO: 1.1 10*3/MM3 (ref 0.7–3.1)
LYMPHOCYTES NFR BLD AUTO: 16.2 % (ref 19.6–45.3)
MCH RBC QN AUTO: 36.1 PG (ref 26.6–33)
MCHC RBC AUTO-ENTMCNC: 35.4 G/DL (ref 31.5–35.7)
MCV RBC AUTO: 102.1 FL (ref 79–97)
MONOCYTES # BLD AUTO: 0.8 10*3/MM3 (ref 0.1–0.9)
MONOCYTES NFR BLD AUTO: 12 % (ref 5–12)
NEUTROPHILS NFR BLD AUTO: 4.6 10*3/MM3 (ref 1.7–7)
NEUTROPHILS NFR BLD AUTO: 66.8 % (ref 42.7–76)
NRBC BLD AUTO-RTO: 0.3 /100 WBC (ref 0–0.2)
PLATELET # BLD AUTO: 201 10*3/MM3 (ref 140–450)
PMV BLD AUTO: 7.5 FL (ref 6–12)
PROTHROMBIN TIME: 10.8 SECONDS (ref 9.6–11.7)
RBC # BLD AUTO: 4.42 10*6/MM3 (ref 4.14–5.8)
WBC NRBC COR # BLD: 6.9 10*3/MM3 (ref 3.4–10.8)

## 2021-12-01 PROCEDURE — 85610 PROTHROMBIN TIME: CPT | Performed by: EMERGENCY MEDICINE

## 2021-12-01 PROCEDURE — 99282 EMERGENCY DEPT VISIT SF MDM: CPT

## 2021-12-01 PROCEDURE — 36415 COLL VENOUS BLD VENIPUNCTURE: CPT

## 2021-12-01 PROCEDURE — 85025 COMPLETE CBC W/AUTO DIFF WBC: CPT | Performed by: EMERGENCY MEDICINE

## 2021-12-01 PROCEDURE — 85730 THROMBOPLASTIN TIME PARTIAL: CPT | Performed by: EMERGENCY MEDICINE

## 2021-12-01 NOTE — TELEPHONE ENCOUNTER
Patient called today stating hes having very frequent nose bleeds since starting entresto 24/26mg daily.

## 2021-12-01 NOTE — TELEPHONE ENCOUNTER
Not from entresto or [facility]. If he cant get his nose bleed to stop he needs to go to ER or if it does stop he needs to see ENT per dr neri.

## 2021-12-01 NOTE — ED PROVIDER NOTES
Subjective   Chief complaint: Nosebleeds    71-year-old male presents with nosebleeds.  Patient states symptoms have been intermittent over the past 3 days.  Patient takes aspirin but no other blood thinners.  He states the bleeding is always from the right naris.  He denies any injury.  Patient states his most recent nosebleed was around 2 PM today.  It was stopped by the time he got to the emergency room.      History provided by:  Patient      Review of Systems   Constitutional: Negative for fever.   HENT: Positive for nosebleeds. Negative for congestion.    Respiratory: Negative for cough and shortness of breath.    Cardiovascular: Negative for chest pain.   Gastrointestinal: Negative for abdominal pain.   Skin: Negative for rash.   Neurological: Negative for headaches.       Past Medical History:   Diagnosis Date   • 3-vessel CAD 12/24/2018    Severe Distal Main Noted on Cardiac Cath w/High-Grade Obstruction Involving the LAD; S/p CABG 12/2018   • Arthritis    • Atelectasis 12/12/2018    Noted on CT Chest   • Carpal tunnel syndrome     S/p Bret Release   • Chest pain due to CAD ([facility]) 12/2018   • CHF (congestive heart failure) ([facility]) 2018   • Chronic back pain     x2 Lumbar Fusions   • Chronic neck pain     Hx Cervical Fusion   • DDD (degenerative disc disease), lumbar    • Dyspnea on exertion 12/2018   • Emphysema of lung ([facility]) 12/12/2018    Noted on CT Chest   • Encounter for general adult medical examination without abnormal findings 10/1/2013   • HFrEF (heart failure with reduced ejection fraction) ([facility]) 2/7/2019   • History of echocardiogram 12/21/18-F    EF 40%; Mild L Atrial Enlargement Measuring 4.3CM; Normal Root/No Effusion; Ischemic Cardiomyopathy Changes Noted; Mild MVR Noted   • History of EKG 12/20/18-F    Sinus Tachycardia Noted w/Abnormal Changes   • HLD (hyperlipidemia)    • Hypertension    • Hypothyroidism    • Iron deficiency anemia 12/26/2018-PO    w/Iron Transfusion   • Ischemic  cardiomyopathy 12/21/2018    Noted on Echo   • LAD stenosis 12/24/2018    L Circumfrex @ 90% Narrowing w/Severe Blockage of Obtuse Marginal Branch and 99% Narrowing of Ostal Ramus INtermedius Branch   • Lung nodule 12/21/2018    Small Stable in R Noted on CT Chest   • Mild atrial enlargement, left 12/21/2018    Noted on Echo   • Mild mitral valve regurgitation 12/21/2018    Noted on Echo   • Orthopnea 12/2018   • Osteoarthritis     Severe All Over; Hx Several Surgeries; Takes Oxycodone Daily   • Pleural effusion, bilateral 12/12/18-Moderate & 12/21/18-Small    Noted on CT Chest   • Pulmonary edema 12/2018   • RCA occlusion (HCC) 12/24/2018    Total Occlusion Noted on Cardiac Cath   • Sinus tachycardia 12/20/18-BHF    Noted on EKG   • SOB (shortness of breath) 12/2018       No Known Allergies    Past Surgical History:   Procedure Laterality Date   • ANKLE SURGERY Right    • CARDIAC CATHETERIZATION Left 12/24/18-F    w/R Coronary Arteriography/Coronary Angiography--Dr. Gardiner--Severe Distal L Main Disease with 3V CAD w/High-Grade Obstruction Involving the LAD L Cirumflex Ramus   • CARPAL TUNNEL RELEASE Bilateral     x2   • CERVICAL FUSION      x1   • COLONOSCOPY  2001    WNL   • CORONARY ARTERY BYPASS GRAFT  12/26/18-F    Urgent x5 w/L Vein Grafting-Dr. Aragon   • ELBOW ARTHROSCOPY Right    • ENDOSCOPIC VEIN HARVEST  12/26/18-F    R Lower Extremity-Dr. Aragon   • EXTERNAL EAR SURGERY Left    • KNEE ARTHROSCOPY Right     x3   • KNEE SURGERY Left    • LUMBAR FUSION      x2   • NASAL FRACTURE SURGERY     • OTHER SURGICAL HISTORY      R Forearm Surgery   • REPLACEMENT TOTAL KNEE Bilateral    • SHOULDER SURGERY Left    • SPINE SURGERY  2011   • SPINE SURGERY  2013   • SPINE SURGERY  2017    x2    • SPINE SURGERY  2018   • SPINE SURGERY  2020       Family History   Problem Relation Age of Onset   • Cancer Mother         non hodgkins lymphoma    • Heart attack Father    • Heart disease Father    • Diabetes Brother   "      Social History     Socioeconomic History   • Marital status:      Spouse name: Melissa   • Number of children: 2   Tobacco Use   • Smoking status: Former Smoker     Packs/day: 1.50     Years: 15.00     Pack years: 22.50     Types: Cigarettes     Quit date: 1992     Years since quittin.5   • Smokeless tobacco: Never Used   Vaping Use   • Vaping Use: Never used   Substance and Sexual Activity   • Alcohol use: Yes     Comment: Occ Beer   • Drug use: No   • Sexual activity: Defer       /86 (BP Location: Left arm, Patient Position: Sitting)   Pulse 86   Temp 98.1 °F (36.7 °C) (Oral)   Resp 16   Ht 182.9 cm (72\")   Wt 97 kg (213 lb 13.5 oz)   SpO2 95%   BMI 29.00 kg/m²       Objective   Physical Exam  Vitals and nursing note reviewed.   Constitutional:       Appearance: Normal appearance.   HENT:      Head: Normocephalic and atraumatic.      Nose:      Comments: There is no active epistaxis at this time.  There is a raw area in the right naris along the nasal septum which could have been the source of bleeding.     Mouth/Throat:      Mouth: Mucous membranes are moist.   Eyes:      Pupils: Pupils are equal, round, and reactive to light.   Cardiovascular:      Rate and Rhythm: Normal rate and regular rhythm.      Heart sounds: Normal heart sounds.   Pulmonary:      Effort: Pulmonary effort is normal. No respiratory distress.   Skin:     General: Skin is warm and dry.   Neurological:      Mental Status: He is alert and oriented to person, place, and time.         Procedures           ED Course      Results for orders placed or performed during the hospital encounter of 21   Protime-INR    Specimen: Blood   Result Value Ref Range    Protime 10.8 9.6 - 11.7 Seconds    INR 0.97 0.93 - 1.10   aPTT    Specimen: Blood   Result Value Ref Range    PTT 26.8 24.0 - 31.0 seconds   CBC Auto Differential    Specimen: Blood   Result Value Ref Range    WBC 6.90 3.40 - 10.80 10*3/mm3    RBC 4.42 4.14 - " 5.80 10*6/mm3    Hemoglobin 16.0 13.0 - 17.7 g/dL    Hematocrit 45.1 37.5 - 51.0 %    .1 (H) 79.0 - 97.0 fL    MCH 36.1 (H) 26.6 - 33.0 pg    MCHC 35.4 31.5 - 35.7 g/dL    RDW 16.4 (H) 12.3 - 15.4 %    RDW-SD 56.0 (H) 37.0 - 54.0 fl    MPV 7.5 6.0 - 12.0 fL    Platelets 201 140 - 450 10*3/mm3    Neutrophil % 66.8 42.7 - 76.0 %    Lymphocyte % 16.2 (L) 19.6 - 45.3 %    Monocyte % 12.0 5.0 - 12.0 %    Eosinophil % 4.4 0.3 - 6.2 %    Basophil % 0.6 0.0 - 1.5 %    Neutrophils, Absolute 4.60 1.70 - 7.00 10*3/mm3    Lymphocytes, Absolute 1.10 0.70 - 3.10 10*3/mm3    Monocytes, Absolute 0.80 0.10 - 0.90 10*3/mm3    Eosinophils, Absolute 0.30 0.00 - 0.40 10*3/mm3    Basophils, Absolute 0.00 0.00 - 0.20 10*3/mm3    nRBC 0.3 (H) 0.0 - 0.2 /100 WBC                                                MDM   Labs are unremarkable.  Patient did not have any active epistaxis in the emergency room.  There was a small area along the nasal septum in the right naris which could have been the source of bleeding.  This was cauterized with silver nitrate.  Patient is stable for discharge.      Final diagnoses:   Epistaxis       ED Disposition  ED Disposition     ED Disposition Condition Comment    Discharge Stable           Mary Busby, APRN  [address]  [address]  [place] [id number] [phone]    Call in 2 days           Medication List      Changed    bumetanide 2 MG tablet  Commonly known as: BUMEX  TAKE ONE TABLET BY MOUTH TWICE A DAY AS NEEDED  What changed:   · how much to take  · how to take this  · when to take this     KLOR-CON 20 MEQ CR tablet  Generic drug: potassium chloride  TAKE ONE TABLET BY MOUTH TWICE A DAY  What changed:   · how much to take  · how to take this  · when to take this     pregabalin 50 MG capsule  Commonly known as: Lyrica  Take 1 capsule by mouth 2 (Two) Times a Day.  What changed: how much to take             Fab Milan MD  12/01/21 1829

## 2021-12-01 NOTE — TELEPHONE ENCOUNTER
Patient called and stated that he is having another nose bleed and can't get it to stop  I spoke with Cheri MARIN And she told me to tell the patient to go to the ER

## 2021-12-01 NOTE — ED TRIAGE NOTES
PT REPORTS CHANGE IN BP MEDS LAST WEEK, STARTING Saturday C/O INTERMITTENT NOSEBLEEDS FROM RT NOSTRIL. BLEEDING CURRENTLY CONTROLLED.

## 2021-12-06 ENCOUNTER — CLINICAL SUPPORT (OUTPATIENT)
Dept: FAMILY MEDICINE CLINIC | Facility: CLINIC | Age: 71
End: 2021-12-06

## 2021-12-06 DIAGNOSIS — E29.1 DEFICIENCY OF TESTOSTERONE BIOSYNTHESIS: ICD-10-CM

## 2021-12-06 PROCEDURE — 96372 THER/PROPH/DIAG INJ SC/IM: CPT | Performed by: NURSE PRACTITIONER

## 2021-12-06 RX ADMIN — TESTOSTERONE CYPIONATE 200 MG: 200 INJECTION, SOLUTION INTRAMUSCULAR at 08:56

## 2021-12-12 RX ORDER — ROSUVASTATIN CALCIUM 20 MG/1
TABLET, COATED ORAL
Qty: 90 TABLET | Refills: 1 | Status: SHIPPED | OUTPATIENT
Start: 2021-12-12 | End: 2022-06-15

## 2021-12-17 ENCOUNTER — HOSPITAL ENCOUNTER (OUTPATIENT)
Dept: NUCLEAR MEDICINE | Facility: HOSPITAL | Age: 71
Discharge: HOME | End: 2021-12-17

## 2021-12-17 ENCOUNTER — HOSPITAL ENCOUNTER (OUTPATIENT)
Dept: CARDIOLOGY | Facility: HOSPITAL | Age: 71
Discharge: HOME | End: 2021-12-17

## 2021-12-17 VITALS
DIASTOLIC BLOOD PRESSURE: 64 MMHG | WEIGHT: 213 LBS | BODY MASS INDEX: 28.85 KG/M2 | HEIGHT: 72 IN | SYSTOLIC BLOOD PRESSURE: 137 MMHG

## 2021-12-17 DIAGNOSIS — I73.9 CLAUDICATION (HCC): ICD-10-CM

## 2021-12-17 DIAGNOSIS — R07.89 CHEST PAIN, ATYPICAL: ICD-10-CM

## 2021-12-17 LAB
ANION GAP SERPL CALCULATED.3IONS-SCNC: 7.8 MMOL/L (ref 5–15)
BH CV ECHO MEAS - % IVS THICK: 43.8 %
BH CV ECHO MEAS - % LVPW THICK: 31.5 %
BH CV ECHO MEAS - ACS: 1.6 CM
BH CV ECHO MEAS - AO MAX PG (FULL): 3 MMHG
BH CV ECHO MEAS - AO MAX PG: 6.6 MMHG
BH CV ECHO MEAS - AO MEAN PG (FULL): 1.5 MMHG
BH CV ECHO MEAS - AO MEAN PG: 3.7 MMHG
BH CV ECHO MEAS - AO ROOT AREA (BSA CORRECTED): 1.4
BH CV ECHO MEAS - AO ROOT AREA: 7.5 CM^2
BH CV ECHO MEAS - AO ROOT DIAM: 3.1 CM
BH CV ECHO MEAS - AO V2 MAX: 128.7 CM/SEC
BH CV ECHO MEAS - AO V2 MEAN: 92.6 CM/SEC
BH CV ECHO MEAS - AO V2 VTI: 28.5 CM
BH CV ECHO MEAS - AVA(I,A): 2 CM^2
BH CV ECHO MEAS - AVA(I,D): 2 CM^2
BH CV ECHO MEAS - AVA(V,A): 1.9 CM^2
BH CV ECHO MEAS - AVA(V,D): 1.9 CM^2
BH CV ECHO MEAS - BSA(HAYCOCK): 2.2 M^2
BH CV ECHO MEAS - BSA: 2.2 M^2
BH CV ECHO MEAS - BZI_BMI: 28.9 KILOGRAMS/M^2
BH CV ECHO MEAS - BZI_METRIC_HEIGHT: 182.9 CM
BH CV ECHO MEAS - BZI_METRIC_WEIGHT: 96.6 KG
BH CV ECHO MEAS - EDV(CUBED): 65.6 ML
BH CV ECHO MEAS - EDV(MOD-SP4): 156.7 ML
BH CV ECHO MEAS - EDV(TEICH): 71.4 ML
BH CV ECHO MEAS - EF(CUBED): 73.8 %
BH CV ECHO MEAS - EF(MOD-BP): 52 %
BH CV ECHO MEAS - EF(MOD-SP4): 56.8 %
BH CV ECHO MEAS - EF(TEICH): 66.2 %
BH CV ECHO MEAS - ESV(CUBED): 17.2 ML
BH CV ECHO MEAS - ESV(MOD-SP4): 67.7 ML
BH CV ECHO MEAS - ESV(TEICH): 24.2 ML
BH CV ECHO MEAS - FS: 36 %
BH CV ECHO MEAS - IVS/LVPW: 0.88
BH CV ECHO MEAS - IVSD: 1 CM
BH CV ECHO MEAS - IVSS: 1.5 CM
BH CV ECHO MEAS - LA DIMENSION(2D): 4.6 CM
BH CV ECHO MEAS - LV DIASTOLIC VOL/BSA (35-75): 71.6 ML/M^2
BH CV ECHO MEAS - LV MASS(C)D: 148.8 GRAMS
BH CV ECHO MEAS - LV MASS(C)DI: 68 GRAMS/M^2
BH CV ECHO MEAS - LV MASS(C)S: 133.8 GRAMS
BH CV ECHO MEAS - LV MASS(C)SI: 61.2 GRAMS/M^2
BH CV ECHO MEAS - LV MAX PG: 3.7 MMHG
BH CV ECHO MEAS - LV MEAN PG: 2.2 MMHG
BH CV ECHO MEAS - LV SYSTOLIC VOL/BSA (12-30): 30.9 ML/M^2
BH CV ECHO MEAS - LV V1 MAX: 95.6 CM/SEC
BH CV ECHO MEAS - LV V1 MEAN: 69.7 CM/SEC
BH CV ECHO MEAS - LV V1 VTI: 21.8 CM
BH CV ECHO MEAS - LVIDD: 4 CM
BH CV ECHO MEAS - LVIDS: 2.6 CM
BH CV ECHO MEAS - LVOT AREA: 2.6 CM^2
BH CV ECHO MEAS - LVOT DIAM: 1.8 CM
BH CV ECHO MEAS - LVPWD: 1.2 CM
BH CV ECHO MEAS - LVPWS: 1.5 CM
BH CV ECHO MEAS - MR MAX PG: 81 MMHG
BH CV ECHO MEAS - MR MAX VEL: 450 CM/SEC
BH CV ECHO MEAS - MV A MAX VEL: 33.6 CM/SEC
BH CV ECHO MEAS - MV DEC SLOPE: 611.2 CM/SEC^2
BH CV ECHO MEAS - MV DEC TIME: 0.18 SEC
BH CV ECHO MEAS - MV E MAX VEL: 54.5 CM/SEC
BH CV ECHO MEAS - MV E/A: 1.6
BH CV ECHO MEAS - MV MAX PG: 4.1 MMHG
BH CV ECHO MEAS - MV MEAN PG: 1.5 MMHG
BH CV ECHO MEAS - MV V2 MAX: 101.3 CM/SEC
BH CV ECHO MEAS - MV V2 MEAN: 58.1 CM/SEC
BH CV ECHO MEAS - MV V2 VTI: 27.8 CM
BH CV ECHO MEAS - MVA(VTI): 2 CM^2
BH CV ECHO MEAS - PA ACC TIME: 0.08 SEC
BH CV ECHO MEAS - PA MAX PG (FULL): 0.26 MMHG
BH CV ECHO MEAS - PA MAX PG: 1.4 MMHG
BH CV ECHO MEAS - PA MEAN PG (FULL): 0.07 MMHG
BH CV ECHO MEAS - PA MEAN PG: 0.74 MMHG
BH CV ECHO MEAS - PA PR(ACCEL): 44.7 MMHG
BH CV ECHO MEAS - PA V2 MAX: 58.5 CM/SEC
BH CV ECHO MEAS - PA V2 MEAN: 41.5 CM/SEC
BH CV ECHO MEAS - PA V2 VTI: 11.6 CM
BH CV ECHO MEAS - PULM A REVS DUR: 0.06 SEC
BH CV ECHO MEAS - PULM A REVS VEL: 21.3 CM/SEC
BH CV ECHO MEAS - PULM DIAS VEL: 55 CM/SEC
BH CV ECHO MEAS - PULM S/D: 0.96
BH CV ECHO MEAS - PULM SYS VEL: 52.6 CM/SEC
BH CV ECHO MEAS - RAP SYSTOLE: 3 MMHG
BH CV ECHO MEAS - RV MAX PG: 1.1 MMHG
BH CV ECHO MEAS - RV MEAN PG: 0.67 MMHG
BH CV ECHO MEAS - RV V1 MAX: 52.7 CM/SEC
BH CV ECHO MEAS - RV V1 MEAN: 39.5 CM/SEC
BH CV ECHO MEAS - RV V1 VTI: 11.1 CM
BH CV ECHO MEAS - RVDD: 2.6 CM
BH CV ECHO MEAS - RVSP: 30 MMHG
BH CV ECHO MEAS - SI(AO): 98.4 ML/M^2
BH CV ECHO MEAS - SI(CUBED): 22.1 ML/M^2
BH CV ECHO MEAS - SI(LVOT): 26 ML/M^2
BH CV ECHO MEAS - SI(MOD-SP4): 40.7 ML/M^2
BH CV ECHO MEAS - SI(TEICH): 21.6 ML/M^2
BH CV ECHO MEAS - SV(AO): 215.2 ML
BH CV ECHO MEAS - SV(CUBED): 48.4 ML
BH CV ECHO MEAS - SV(LVOT): 56.8 ML
BH CV ECHO MEAS - SV(MOD-SP4): 89 ML
BH CV ECHO MEAS - SV(TEICH): 47.2 ML
BH CV ECHO MEAS - TR MAX VEL: 259.2 CM/SEC
BH CV LOWER ARTERIAL LEFT ABI RATIO: 1.5
BH CV LOWER ARTERIAL LEFT DORSALIS PEDIS SYS MAX: 195 MMHG
BH CV LOWER ARTERIAL RIGHT ABI RATIO: 1.3
BH CV LOWER ARTERIAL RIGHT DORSALIS PEDIS SYS MAX: 169 MMHG
BH CV NUCLEAR PRIOR STUDY: 3
BH CV REST NUCLEAR ISOTOPE DOSE: 7.9 MCI
BH CV STRESS COMMENTS STAGE 1: NORMAL
BH CV STRESS DOSE REGADENOSON STAGE 1: 0.4
BH CV STRESS DURATION MIN STAGE 1: 1
BH CV STRESS DURATION MIN STAGE 2: 1
BH CV STRESS DURATION MIN STAGE 3: 0
BH CV STRESS DURATION SEC STAGE 2: 0
BH CV STRESS DURATION SEC STAGE 3: 14
BH CV STRESS HR STAGE 1: 73
BH CV STRESS HR STAGE 2: 80
BH CV STRESS HR STAGE 3: 82
BH CV STRESS NUCLEAR ISOTOPE DOSE: 21.3 MCI
BH CV STRESS PROTOCOL 1: NORMAL
BH CV STRESS RECOVERY BP: NORMAL MMHG
BH CV STRESS RECOVERY HR: 82 BPM
BH CV STRESS STAGE 1: 1
BH CV STRESS STAGE 2: 2
BH CV STRESS STAGE 3: 3
BUN SERPL-MCNC: 20 MG/DL (ref 8–23)
BUN/CREAT SERPL: 21.1 (ref 7–25)
CALCIUM SPEC-SCNC: 9 MG/DL (ref 8.6–10.5)
CHLORIDE SERPL-SCNC: 104 MMOL/L (ref 98–107)
CO2 SERPL-SCNC: 28.2 MMOL/L (ref 22–29)
CREAT SERPL-MCNC: 0.95 MG/DL (ref 0.76–1.27)
GFR SERPL CREATININE-BSD FRML MDRD: 78 ML/MIN/1.73
GLUCOSE SERPL-MCNC: 132 MG/DL (ref 65–99)
LV EF NUC BP: 42 %
MAGNESIUM SERPL-MCNC: 2.2 MG/DL (ref 1.6–2.4)
MAXIMAL PREDICTED HEART RATE: 150 BPM
MAXIMAL PREDICTED HEART RATE: 150 BPM
PERCENT MAX PREDICTED HR: 60 %
POTASSIUM SERPL-SCNC: 4.5 MMOL/L (ref 3.5–5.2)
SODIUM SERPL-SCNC: 140 MMOL/L (ref 136–145)
STRESS BASELINE BP: NORMAL MMHG
STRESS BASELINE HR: 74 BPM
STRESS PERCENT HR: 71 %
STRESS POST PEAK BP: NORMAL MMHG
STRESS POST PEAK HR: 90 BPM
STRESS TARGET HR: 128 BPM
STRESS TARGET HR: 128 BPM
UPPER ARTERIAL LEFT ARM BRACHIAL SYS MAX: 130 MMHG
UPPER ARTERIAL RIGHT ARM BRACHIAL SYS MAX: 124 MMHG

## 2021-12-17 PROCEDURE — 80048 BASIC METABOLIC PNL TOTAL CA: CPT | Performed by: INTERNAL MEDICINE

## 2021-12-17 PROCEDURE — A9502 TC99M TETROFOSMIN: HCPCS | Performed by: INTERNAL MEDICINE

## 2021-12-17 PROCEDURE — 25010000002 SULFUR HEXAFLUORIDE MICROSPH 60.7-25 MG RECONSTITUTED SUSPENSION: Performed by: INTERNAL MEDICINE

## 2021-12-17 PROCEDURE — 0 TECHNETIUM TETROFOSMIN KIT: Performed by: INTERNAL MEDICINE

## 2021-12-17 PROCEDURE — 78452 HT MUSCLE IMAGE SPECT MULT: CPT

## 2021-12-17 PROCEDURE — 93306 TTE W/DOPPLER COMPLETE: CPT

## 2021-12-17 PROCEDURE — 93016 CV STRESS TEST SUPVJ ONLY: CPT | Performed by: INTERNAL MEDICINE

## 2021-12-17 PROCEDURE — 93306 TTE W/DOPPLER COMPLETE: CPT | Performed by: INTERNAL MEDICINE

## 2021-12-17 PROCEDURE — 25010000002 REGADENOSON 0.4 MG/5ML SOLUTION: Performed by: INTERNAL MEDICINE

## 2021-12-17 PROCEDURE — 83735 ASSAY OF MAGNESIUM: CPT | Performed by: INTERNAL MEDICINE

## 2021-12-17 PROCEDURE — 93017 CV STRESS TEST TRACING ONLY: CPT

## 2021-12-17 PROCEDURE — 78452 HT MUSCLE IMAGE SPECT MULT: CPT | Performed by: INTERNAL MEDICINE

## 2021-12-17 PROCEDURE — 93018 CV STRESS TEST I&R ONLY: CPT | Performed by: INTERNAL MEDICINE

## 2021-12-17 PROCEDURE — 93922 UPR/L XTREMITY ART 2 LEVELS: CPT

## 2021-12-17 RX ORDER — ALLOPURINOL 300 MG/1
TABLET ORAL
Qty: 90 TABLET | Refills: 0 | Status: SHIPPED | OUTPATIENT
Start: 2021-12-17 | End: 2021-12-21

## 2021-12-17 RX ADMIN — TETROFOSMIN 1 DOSE: 1.38 INJECTION, POWDER, LYOPHILIZED, FOR SOLUTION INTRAVENOUS at 07:46

## 2021-12-17 RX ADMIN — SULFUR HEXAFLUORIDE 2 ML: KIT at 08:26

## 2021-12-17 RX ADMIN — REGADENOSON 0.4 MG: 0.08 INJECTION, SOLUTION INTRAVENOUS at 09:00

## 2021-12-17 RX ADMIN — TETROFOSMIN 1 DOSE: 1.38 INJECTION, POWDER, LYOPHILIZED, FOR SOLUTION INTRAVENOUS at 09:00

## 2021-12-20 ENCOUNTER — CLINICAL SUPPORT (OUTPATIENT)
Dept: FAMILY MEDICINE CLINIC | Facility: CLINIC | Age: 71
End: 2021-12-20

## 2021-12-20 DIAGNOSIS — E29.1 HYPOGONADISM MALE: ICD-10-CM

## 2021-12-20 PROCEDURE — 96372 THER/PROPH/DIAG INJ SC/IM: CPT | Performed by: NURSE PRACTITIONER

## 2021-12-20 RX ADMIN — TESTOSTERONE CYPIONATE 200 MG: 200 INJECTION, SOLUTION INTRAMUSCULAR at 09:42

## 2021-12-21 ENCOUNTER — OFFICE VISIT (OUTPATIENT)
Dept: CARDIOLOGY | Facility: CLINIC | Age: 71
End: 2021-12-21

## 2021-12-21 VITALS
BODY MASS INDEX: 29.61 KG/M2 | DIASTOLIC BLOOD PRESSURE: 72 MMHG | HEART RATE: 64 BPM | RESPIRATION RATE: 18 BRPM | WEIGHT: 218.6 LBS | HEIGHT: 72 IN | SYSTOLIC BLOOD PRESSURE: 124 MMHG

## 2021-12-21 DIAGNOSIS — I25.5 ISCHEMIC CARDIOMYOPATHY: ICD-10-CM

## 2021-12-21 DIAGNOSIS — I25.10 CORONARY ARTERY DISEASE INVOLVING NATIVE CORONARY ARTERY OF NATIVE HEART WITHOUT ANGINA PECTORIS: Primary | ICD-10-CM

## 2021-12-21 DIAGNOSIS — E78.2 MIXED HYPERLIPIDEMIA: ICD-10-CM

## 2021-12-21 DIAGNOSIS — Z95.1 S/P CABG X 5: ICD-10-CM

## 2021-12-21 DIAGNOSIS — I10 ESSENTIAL HYPERTENSION: ICD-10-CM

## 2021-12-21 DIAGNOSIS — R07.89 CHEST PAIN, ATYPICAL: ICD-10-CM

## 2021-12-21 PROCEDURE — 99214 OFFICE O/P EST MOD 30 MIN: CPT | Performed by: INTERNAL MEDICINE

## 2021-12-21 RX ORDER — LISINOPRIL 10 MG/1
10 TABLET ORAL DAILY
Qty: 90 TABLET | Refills: 3 | Status: SHIPPED | OUTPATIENT
Start: 2021-12-21 | End: 2023-03-30

## 2021-12-21 NOTE — PROGRESS NOTES
Cardiology Clinic Note  Tristan Kumar MD, PhD    Subjective:     Encounter Date:12/21/2021      Patient ID: Arik August is a 71 y.o. male.    Chief Complaint:  Chief Complaint   Patient presents with   • Follow-up       HPI:    Previously I the pleasure to see this very pleasant 71-year-old gentleman  as a new patient.  He has a history multivessel bypass surgery ischemic cardiomyopathy last EF was 40% back in 2018 but no evaluation since this time.  He is on metoprolol tartrate and lisinopril which is not optimal by current guidelines and he should be on Entresto long-acting metoprolol loop diuretic and potentially Aldactone if blood pressure and renal function will tolerate.  He is on moderate to high intensity dose statin with Crestor.  His main complaint is lower extremity weakness he has multiple back surgeries but no history of any PVD or carotid disease but he is at high risk with ischemic heart disease.  He also complains of substernal chest pain waxing and waning over the last few days lasting minutes which feels very similar to his prior heart trouble pain he says no dizziness passing out a lasted minutes substernal without significant radiation was associated with some mild diaphoresis and shortness of breath.     Patient underwent 2D echo with low normal LV systolic function with regional abnormality in the basal inferior wall which appeared like old MI, EF was 50% with inferior wall akinesis regionally, other segments had normal thickening, stress evaluation with nuclear imaging demonstrated medium large sized inferior infarct with only mild mayra-infarct ischemia other segments had normal perfusion and uptake again with EF that was 45 to 50%.  He denies any exertional angina and has some atypical symptoms intermittently but these are fleeting.  He is also multiple back surgeries with pain radiating around the chest like a band as well as lower extremity bilateral numbness without evidence of  flow-limiting claudication, ABIs were uninterpretable with incompressible vessels however he has no lower extremity wounds.  He has no cramping or claudication with ambulation and he does have some mild muscle wasting in his calves and lower extremities possibly indicative of neurogenic claudication and numbness.  We did discuss nerve conduction studies and EMGs and referral to neurology which she already has an appointment.  He is reassured by stress testing and 2D echo results.  If we are concerned about vascular claudication we can order CT AIF for evaluation of the peripheral vascular tree bilaterally      Review of systems -14 point review of systems except was mentioned above     Historical data copied forward from previous encounters in EMR is unchanged     Exam  Trace pitting edema to the shins  Mild muscle wasting peripherally concerning for more neurogenic claudication  Cap refill is normal  Regular rate and rhythm no rubs gallops 196 systolic ejection murmur lower sternal border  Trace JVD mild HJR sitting upright  No neurologic deficits grossly  Soft nontender nondistended  Clear to auscultation  No new cardiac findings     Assessment plan per my encounter  Multivessel CAD status post bypass surgery 2018  Atypical chest pain old inferior infarct, mild mayra-infarct ischemia, EF 50% by Lexiscan  2D echo demonstrates EF 50% with inferior wall akinesis consistent with old infarct  Continue aspirin statin beta-blocker  Continue aspirin therapy only without Plavix given recurrent nosebleeds     Hypertension, presently controlled     Ischemic cardiomyopathy with systolic heart failure  Continue Bumex 2 mg daily  Has had nosebleeds on Entresto curiously, stop Entresto go back to lisinopril 10 mg daily, EF 50%, no active clinical heart failure, edema is controlled  Can consider Aldactone if needed     Erectile dysfunction, on Cialis intermittently, no nitrates on board, reinforced     Hyperlipidemia continue  "rosuvastatin 20 daily     Secondary prevention goals     Functionally weakness, ABIs with incompressible vessels, if concern would recommend CT AIF for evaluation of PVD and lower extremities contributing to lower extremity numbness but most likely this is a neurologic sequela .      Back to clinic in 6 months     New patient to me with new problems above     Tristan Kumar MD, PhD    Historical data copied forward from previous encounters in EMR including the history, exam, and assessment/plan has been reviewed and is unchanged unless noted otherwise.    Cardiac medicines reviewed with risk, benefits, and necessity of each discussed.    Risk and benefit of cardiac testing reviewed including death heart attack stroke pain bleeding infection need for vascular /cardiovascular surgery were discussed and the patient     Objective:         /72 (BP Location: Left arm, Patient Position: Sitting)   Pulse 64   Resp 18   Ht 182.9 cm (72\")   Wt 99.2 kg (218 lb 9.6 oz)   BMI 29.65 kg/m²     Physical Exam    Assessment:         There are no diagnoses linked to this encounter.       Plan:              The pleasure to be involved in this patient's cardiovascular care.  Please call with any questions or concerns  Tristan Kumar MD, PhD    Most recent EKG as reviewed and interpreted by me:  Procedures     Most recent echo as reviewed and interpreted by me:  Results for orders placed during the hospital encounter of 12/17/21    Adult Transthoracic Echo Complete W/ Cont if Necessary Per Protocol    Interpretation Summary  · Estimated left ventricular EF was in agreement with the calculated left ventricular EF. Left ventricular ejection fraction appears to be 51 - 55%. Left ventricular systolic function is low normal.  · Left atrial volume is mildly increased.  · Estimated right ventricular systolic pressure from tricuspid regurgitation is normal (<35 mmHg).    Low normal LV systolic function, EF 50 to 55%  Regional " abnormalities with severe hypokinesis of the basal to mid inferior wall appears like old infarct, thin walled segment  Mid to apical inferior segments contract and thicken normally as well as all other LV segments  Normal RV size and function  Borderline to mild left atrial enlargement, normal right atrium  IVC normal caliber, radial pressure estimated 0-5  Mild MR no stenosis  Normal aortic valve  Normal tricuspid valve with trace regurgitation, no stenosis  Trace MD no stenosis  No masses or effusion seen  Diastolic functional parameters with mild impaired relaxation only  No gross mobile echodensity seen      Most recent stress test as reviewed and interpreted by me:  Results for orders placed during the hospital encounter of 12/17/21    Stress Test With Myocardial Perfusion One Day    Interpretation Summary  · Left ventricular ejection fraction is moderately reduced. (Calculated EF = 42%).  · Myocardial perfusion imaging indicates a medium-to-large-sized infarct located in the inferior wall with mild mayra-infarct ischemia.  · Impressions are consistent with a low risk study.  · There is no prior study available for comparison.  · Findings consistent with an equivocal ECG stress test.    Abnormal study  Nuclear imaging consistent with at least medium size inferior to inferolateral infarct with mild mayra-infarct ischemia  There is no reversibility in the septum anterior apical or high lateral wall  Ventricle is mildly large  Moderate reduced LV systolic function EF of 40%  Regional abnormalities with basal inferior akinesis other segments had mild global LV hypokinesis  Stress ECG nondiagnostic did not reach her target heart rate with underlying conduction abnormality, nonspecific ST-T wave abnormalities at stress and recovery  Summed difference score of only 2 with a summed stress score of 10, summed rest score of 8    Low to intermediate risk study, clinical correlation recommended      Most recent cardiac  catheterization as reviewed interpreted by me:  No results found for this or any previous visit.    The following portions of the patient's history were reviewed and updated as appropriate: allergies, current medications, past family history, past medical history, past social history, past surgical history and problem list.      ROS:  14 point review of systems negative except as mentioned above    Current Outpatient Medications:   •  aspirin (aspirin) 81 MG EC tablet, Take 1 tablet by mouth Daily., Disp: , Rfl:   •  bumetanide (BUMEX) 2 MG tablet, TAKE ONE TABLET BY MOUTH TWICE A DAY AS NEEDED (Patient taking differently: Daily.), Disp: 180 tablet, Rfl: 0  •  colchicine 0.6 MG tablet, Take 1 tablet by mouth 2 (Two) Times a Day., Disp: , Rfl:   •  cycloSPORINE (RESTASIS) 0.05 % ophthalmic emulsion, RESTASIS 0.05 % EMUL, Disp: , Rfl:   •  diclofenac (VOLTAREN) 75 MG EC tablet, TAKE ONE TABLET BY MOUTH TWICE A DAY AS NEEDED FOR PAIN, Disp: 180 tablet, Rfl: 0  •  ferrous sulfate 325 (65 FE) MG tablet, TAKE ONE TABLET BY MOUTH DAILY, Disp: 90 tablet, Rfl: 0  •  folic acid (FOLVITE) 1 MG tablet, 2 tablets daily, Disp: , Rfl:   •  KLOR-CON 20 MEQ CR tablet, TAKE ONE TABLET BY MOUTH TWICE A DAY (Patient taking differently: 2 (Two) Times a Day.), Disp: 60 tablet, Rfl: 1  •  levothyroxine (SYNTHROID, LEVOTHROID) 100 MCG tablet, TAKE ONE TABLET BY MOUTH DAILY, Disp: 90 tablet, Rfl: 0  •  magnesium oxide (MAGOX) 400 (241.3 Mg) MG tablet tablet, TAKE ONE TABLET BY MOUTH DAILY, Disp: 90 tablet, Rfl: 0  •  Melatonin 10 MG capsule, Take  by mouth Every Night., Disp: , Rfl:   •  methotrexate 2.5 MG tablet, Take  by mouth. 10 tablets weekly, Disp: , Rfl:   •  metoprolol succinate XL (TOPROL-XL) 25 MG 24 hr tablet, Take 1 tablet by mouth 2 (Two) Times a Day., Disp: 60 tablet, Rfl: 5  •  Multiple Vitamins-Minerals (DAILY MULTIVITAMIN) capsule, DAILY MULTIVITAMIN CAPS, Disp: , Rfl:   •  ofloxacin (OCUFLOX) 0.3 % ophthalmic solution,  Administer 1 drop into the left eye 4 (Four) Times a Day., Disp: , Rfl:   •  oxyCODONE-acetaminophen (PERCOCET)  MG per tablet, Take 1 tablet by mouth Every 6 (Six) Hours As Needed for Moderate Pain ., Disp: 120 tablet, Rfl: 0  •  pantoprazole (PROTONIX) 40 MG EC tablet, Take 1 tablet by mouth Daily., Disp: 90 tablet, Rfl: 2  •  pregabalin (Lyrica) 50 MG capsule, Take 1 capsule by mouth 2 (Two) Times a Day. (Patient taking differently: Take 75 mg by mouth 2 (Two) Times a Day.), Disp: 60 capsule, Rfl: 2  •  rosuvastatin (CRESTOR) 20 MG tablet, TAKE ONE TABLET BY MOUTH DAILY, Disp: 90 tablet, Rfl: 1  •  sulfaSALAzine (AZULFIDINE) 500 MG tablet, Daily., Disp: , Rfl:   •  tadalafil (Cialis) 20 MG tablet, Take 1 tablet by mouth Daily As Needed for Erectile Dysfunction., Disp: 30 tablet, Rfl: 1  •  tamsulosin (FLOMAX) 0.4 MG capsule 24 hr capsule, TAKE ONE CAPSULE BY MOUTH DAILY, Disp: 90 capsule, Rfl: 0  •  vitamin B-12 (CYANOCOBALAMIN) 100 MCG tablet, Take 50 mcg by mouth Daily., Disp: , Rfl:   •  lisinopril (PRINIVIL,ZESTRIL) 10 MG tablet, Take 1 tablet by mouth Daily., Disp: 90 tablet, Rfl: 3    Current Facility-Administered Medications:   •  Testosterone Cypionate (DEPOTESTOTERONE CYPIONATE) injection 200 mg, 200 mg, Intramuscular, Q14 Days, Abbi Rosales MD, 200 mg at 12/20/21 0942    Problem List:  Patient Active Problem List   Diagnosis   • S/P CABG x 5   • Ischemic cardiomyopathy   • Deficiency of testosterone biosynthesis   • Coronary artery disease involving native coronary artery of native heart without angina pectoris   • Mixed hyperlipidemia   • Essential hypertension   • Type 2 diabetes mellitus with hyperglycemia, without long-term current use of insulin (HCC)   • Failed orthopedic implant (HCC)   • Lumbar post-laminectomy syndrome   • Myofascial pain   • Pseudarthrosis following spinal fusion   • Thoracic back pain   • Weakness of both legs   • Thoracic spine fracture (HCC)   •  Pseudoarthrosis of lumbar spine   • Hypothyroidism   • Cubital tunnel syndrome   • Acute blood loss as cause of postoperative anemia   • Mechanical complication of orthopedic internal fixation device ([facility])   • Rheumatoid arthritis involving multiple sites with positive rheumatoid factor ([facility])     Past Medical History:  Past Medical History:   Diagnosis Date   • 3-vessel CAD 12/24/2018    Severe Distal Main Noted on Cardiac Cath w/High-Grade Obstruction Involving the LAD; S/p CABG 12/2018   • Arthritis    • Atelectasis 12/12/2018    Noted on CT Chest   • Carpal tunnel syndrome     S/p Bret Release   • Chest pain due to CAD ([facility]) 12/2018   • CHF (congestive heart failure) ([facility]) 2018   • Chronic back pain     x2 Lumbar Fusions   • Chronic neck pain     Hx Cervical Fusion   • DDD (degenerative disc disease), lumbar    • Dyspnea on exertion 12/2018   • Emphysema of lung ([facility]) 12/12/2018    Noted on CT Chest   • Encounter for general adult medical examination without abnormal findings 10/1/2013   • HFrEF (heart failure with reduced ejection fraction) ([facility]) 2/7/2019   • History of echocardiogram 12/21/18-F    EF 40%; Mild L Atrial Enlargement Measuring 4.3CM; Normal Root/No Effusion; Ischemic Cardiomyopathy Changes Noted; Mild MVR Noted   • History of EKG 12/20/18-F    Sinus Tachycardia Noted w/Abnormal Changes   • HLD (hyperlipidemia)    • Hypertension    • Hypothyroidism    • Iron deficiency anemia 12/26/2018-PO    w/Iron Transfusion   • Ischemic cardiomyopathy 12/21/2018    Noted on Echo   • LAD stenosis 12/24/2018    L Circumfrex @ 90% Narrowing w/Severe Blockage of Obtuse Marginal Branch and 99% Narrowing of Ostal Ramus INtermedius Branch   • Lung nodule 12/21/2018    Small Stable in R Noted on CT Chest   • Mild atrial enlargement, left 12/21/2018    Noted on Echo   • Mild mitral valve regurgitation 12/21/2018    Noted on Echo   • Orthopnea 12/2018   • Osteoarthritis     Severe All Over; Hx Several Surgeries; Takes  Oxycodone Daily   • Pleural effusion, bilateral 18-Moderate & 18-Small    Noted on CT Chest   • Pulmonary edema 2018   • RCA occlusion (HCC) 2018    Total Occlusion Noted on Cardiac Cath   • Sinus tachycardia 18-BHF    Noted on EKG   • SOB (shortness of breath) 2018     Past Surgical History:  Past Surgical History:   Procedure Laterality Date   • ANKLE SURGERY Right    • CARDIAC CATHETERIZATION Left 18-F    w/R Coronary Arteriography/Coronary Angiography--Dr. Gardiner--Severe Distal L Main Disease with 3V CAD w/High-Grade Obstruction Involving the LAD L Cirumflex Ramus   • CARPAL TUNNEL RELEASE Bilateral     x2   • CERVICAL FUSION      x1   • COLONOSCOPY      WNL   • CORONARY ARTERY BYPASS GRAFT  18-[facility]    Urgent x5 w/L Vein Grafting-Dr. Aragon   • ELBOW ARTHROSCOPY Right    • ENDOSCOPIC VEIN HARVEST  18-[facility]    R Lower Extremity-Dr. Aragon   • EXTERNAL EAR SURGERY Left    • KNEE ARTHROSCOPY Right     x3   • KNEE SURGERY Left    • LUMBAR FUSION      x2   • NASAL FRACTURE SURGERY     • OTHER SURGICAL HISTORY      R Forearm Surgery   • REPLACEMENT TOTAL KNEE Bilateral    • SHOULDER SURGERY Left    • SPINE SURGERY  2011   • SPINE SURGERY  2013   • SPINE SURGERY  2017    x2    • SPINE SURGERY  2018   • SPINE SURGERY  2020     Social History:  Social History     Socioeconomic History   • Marital status:      Spouse name: Melissa   • Number of children: 2   Tobacco Use   • Smoking status: Former Smoker     Packs/day: 1.50     Years: 15.00     Pack years: 22.50     Types: Cigarettes     Quit date: 1992     Years since quittin.6   • Smokeless tobacco: Never Used   Vaping Use   • Vaping Use: Never used   Substance and Sexual Activity   • Alcohol use: Yes     Comment: Occ Beer   • Drug use: No   • Sexual activity: Defer     Allergies:  No Known Allergies  Immunizations:  Immunization History   Administered Date(s) Administered   • COVID-19 (MODERNA) 1st, 2nd, 3rd  Dose Only 04/19/2021   • Fluzone High Dose =>65 Years ([employer] ONLY) 11/07/2018, 12/03/2019   • Influenza, Unspecified 09/15/2020   • Pneumococcal Conjugate 13-Valent (PCV13) 09/15/2020   • Pneumococcal Polysaccharide (PPSV23) 06/06/2013, 11/25/2014, 09/11/2018   • Tdap 03/05/2021            In-Office Procedure(s):  No orders to display        ASCVD RIsk Score::  The 10-year ASCVD risk score (Lou SPEARS Jr., et al., 2013) is: 34.4%    Values used to calculate the score:      Age: 71 years      Sex: Male      Is Non- : No      Diabetic: Yes      Tobacco smoker: No      Systolic Blood Pressure: 124 mmHg      Is BP treated: Yes      HDL Cholesterol: 39 mg/dL      Total Cholesterol: 157 mg/dL    Imaging:    Results for orders placed in visit on 07/02/20    SCANNED - IMAGING               Lab Review:   Hospital Outpatient Visit on 12/17/2021   Component Date Value   • RIGHT DORSALIS PEDIS SYS* 12/17/2021 169    • RIGHT AGUSTINA RATIO 12/17/2021 1.3    • LEFT DORSALIS PEDIS SYS * 12/17/2021 195    • LEFT AGUSTINA RATIO 12/17/2021 1.5    • Upper arterial right arm* 12/17/2021 124    • Upper arterial left arm * 12/17/2021 130    Hospital Outpatient Visit on 12/17/2021   Component Date Value   • BSA 12/17/2021 2.2    • RVIDd 12/17/2021 2.6    • IVSd 12/17/2021 1.0    • IVSs 12/17/2021 1.5    • LVIDd 12/17/2021 4.0    • LVIDs 12/17/2021 2.6    • LVPWd 12/17/2021 1.2    • BH CV ECHO SINAN - LVPWS 12/17/2021 1.5    • IVS/LVPW 12/17/2021 0.88    • FS 12/17/2021 36.0    • EDV(Teich) 12/17/2021 71.4    • ESV(Teich) 12/17/2021 24.2    • EF(Teich) 12/17/2021 66.2    • EDV(cubed) 12/17/2021 65.6    • ESV(cubed) 12/17/2021 17.2    • EF(cubed) 12/17/2021 73.8    • % IVS thick 12/17/2021 43.8    • % LVPW thick 12/17/2021 31.5    • LV mass(C)d 12/17/2021 148.8    • LV mass(C)dI 12/17/2021 68.0    • LV mass(C)s 12/17/2021 133.8    • LV mass(C)sI 12/17/2021 61.2    • SV(Teich) 12/17/2021 47.2    • SI(Teich) 12/17/2021 21.6    •  SV(cubed) 12/17/2021 48.4    • SI(cubed) 12/17/2021 22.1    • Ao root diam 12/17/2021 3.1    • Ao root area 12/17/2021 7.5    • ACS 12/17/2021 1.6    • LVOT diam 12/17/2021 1.8    • LVOT area 12/17/2021 2.6    • EDV(MOD-sp4) 12/17/2021 156.7    • ESV(MOD-sp4) 12/17/2021 67.7    • EF(MOD-sp4) 12/17/2021 56.8    • SV(MOD-sp4) 12/17/2021 89.0    • SI(MOD-sp4) 12/17/2021 40.7    • Ao root area (BSA correc* 12/17/2021 1.4    • LV Rivera Vol (BSA correct* 12/17/2021 71.6    • LV Sys Vol (BSA correcte* 12/17/2021 30.9    • MV E max jd 12/17/2021 54.5    • MV A max jd 12/17/2021 33.6    • MV E/A 12/17/2021 1.6    • MV V2 max 12/17/2021 101.3    • MV max PG 12/17/2021 4.1    • MV V2 mean 12/17/2021 58.1    • MV mean PG 12/17/2021 1.5    • MV V2 VTI 12/17/2021 27.8    • MVA(VTI) 12/17/2021 2.0    • MV dec slope 12/17/2021 611.2    • MV dec time 12/17/2021 0.18    • Ao pk jd 12/17/2021 128.7    • Ao max PG 12/17/2021 6.6    • Ao max PG (full) 12/17/2021 3.0    • Ao V2 mean 12/17/2021 92.6    • Ao mean PG 12/17/2021 3.7    • Ao mean PG (full) 12/17/2021 1.5    • Ao V2 VTI 12/17/2021 28.5    • RIAN(I,A) 12/17/2021 2.0    • RIAN(I,D) 12/17/2021 2.0    • RIAN(V,A) 12/17/2021 1.9    • RIAN(V,D) 12/17/2021 1.9    • LV V1 max PG 12/17/2021 3.7    • LV V1 mean PG 12/17/2021 2.2    • LV V1 max 12/17/2021 95.6    • LV V1 mean 12/17/2021 69.7    • LV V1 VTI 12/17/2021 21.8    • MR max jd 12/17/2021 450.0    • MR max PG 12/17/2021 81.0    • SV(Ao) 12/17/2021 215.2    • SI(Ao) 12/17/2021 98.4    • SV(LVOT) 12/17/2021 56.8    • SI(LVOT) 12/17/2021 26.0    • PA V2 max 12/17/2021 58.5    • PA max PG 12/17/2021 1.4    • PA max PG (full) 12/17/2021 0.26    • PA V2 mean 12/17/2021 41.5    • PA mean PG 12/17/2021 0.74    • PA mean PG (full) 12/17/2021 0.07    • PA V2 VTI 12/17/2021 11.6    • PA acc time 12/17/2021 0.08    • RV V1 max PG 12/17/2021 1.1    • RV V1 mean PG 12/17/2021 0.67    • RV V1 max 12/17/2021 52.7    • RV V1 mean 12/17/2021 39.5     • RV V1 VTI 12/17/2021 11.1    • TR max hugh 12/17/2021 259.2    • RVSP(TR) 12/17/2021 30.0    • RAP systole 12/17/2021 3.0    • PA pr(Accel) 12/17/2021 44.7    • Pulm Sys Hugh 12/17/2021 52.6    • Pulm Rivera Hugh 12/17/2021 55.0    • Pulm S/D 12/17/2021 0.96    • Pulm A Revs Dur 12/17/2021 0.06    • Pulm A Revs Hugh 12/17/2021 21.3    •  CV ECHO SINAN - BZI_BMI 12/17/2021 28.9    •  CV ECHO SINAN - BSA(HA* 12/17/2021 2.2    •  CV ECHO SINAN - BZI_ME* 12/17/2021 96.6    •  CV ECHO SINAN - BZI_ME* 12/17/2021 182.9    • Target HR (85%) 12/17/2021 128    • Max. Pred. HR (100%) 12/17/2021 150    • EF(MOD-bp) 12/17/2021 52    • LA dimension(2D) 12/17/2021 4.6    Hospital Outpatient Visit on 12/17/2021   Component Date Value   • Glucose 12/17/2021 132*   • BUN 12/17/2021 20    • Creatinine 12/17/2021 0.95    • Sodium 12/17/2021 140    • Potassium 12/17/2021 4.5    • Chloride 12/17/2021 104    • CO2 12/17/2021 28.2    • Calcium 12/17/2021 9.0    • eGFR Non  Amer 12/17/2021 78    • BUN/Creatinine Ratio 12/17/2021 21.1    • Anion Gap 12/17/2021 7.8    • Magnesium 12/17/2021 2.2    Hospital Outpatient Visit on 12/17/2021   Component Date Value   •  CV STRESS PROTOCOL 1 12/17/2021 Pharmacologic    • Stage 1 12/17/2021 1    • HR Stage 1 12/17/2021 73    • Duration Min Stage 1 12/17/2021 1    • Stress Dose Regadenoson * 12/17/2021 0.4    • Stress Comments Stage 1 12/17/2021 10 sec bolus injection    • Stage 2 12/17/2021 2    • HR Stage 2 12/17/2021 80    • Duration Min Stage 2 12/17/2021 1    • Duration Sec Stage 2 12/17/2021 0    • Stage 3 12/17/2021 3    • HR Stage 3 12/17/2021 82    • Duration Min Stage 3 12/17/2021 0    • Duration Sec Stage 3 12/17/2021 14    • Baseline HR 12/17/2021 74    • Baseline BP 12/17/2021 125/70    • Peak HR 12/17/2021 90    • Percent Max Pred HR 12/17/2021 60.00    • Percent Target HR 12/17/2021 71    • Peak BP 12/17/2021 133/64    • Recovery HR 12/17/2021 82    • Recovery BP 12/17/2021  124/67    • Target HR (85%) 12/17/2021 128    • Max. Pred. HR (100%) 12/17/2021 150    • Nuclear Prior Study 12/17/2021 3    • BH CV REST NUCLEAR ISOTO* 12/17/2021 7.9    • BH CV STRESS NUCLEAR ISO* 12/17/2021 21.3    • Nuc Stress EF 12/17/2021 42    Admission on 12/01/2021, Discharged on 12/01/2021   Component Date Value   • Protime 12/01/2021 10.8    • INR 12/01/2021 0.97    • PTT 12/01/2021 26.8    • WBC 12/01/2021 6.90    • RBC 12/01/2021 4.42    • Hemoglobin 12/01/2021 16.0    • Hematocrit 12/01/2021 45.1    • MCV 12/01/2021 102.1*   • MCH 12/01/2021 36.1*   • MCHC 12/01/2021 35.4    • RDW 12/01/2021 16.4*   • RDW-SD 12/01/2021 56.0*   • MPV 12/01/2021 7.5    • Platelets 12/01/2021 201    • Neutrophil % 12/01/2021 66.8    • Lymphocyte % 12/01/2021 16.2*   • Monocyte % 12/01/2021 12.0    • Eosinophil % 12/01/2021 4.4    • Basophil % 12/01/2021 0.6    • Neutrophils, Absolute 12/01/2021 4.60    • Lymphocytes, Absolute 12/01/2021 1.10    • Monocytes, Absolute 12/01/2021 0.80    • Eosinophils, Absolute 12/01/2021 0.30    • Basophils, Absolute 12/01/2021 0.00    • nRBC 12/01/2021 0.3*   • Extra Tube 12/01/2021 Hold for add-ons.    • Extra Tube 12/01/2021 Hold for add-ons.    Lab on 09/03/2021   Component Date Value   • Glucose 09/03/2021 90    • BUN 09/03/2021 21    • Creatinine 09/03/2021 0.92    • Sodium 09/03/2021 137    • Potassium 09/03/2021 4.3    • Chloride 09/03/2021 103    • CO2 09/03/2021 26.0    • Calcium 09/03/2021 9.1    • Total Protein 09/03/2021 6.7    • Albumin 09/03/2021 4.10    • ALT (SGPT) 09/03/2021 32    • AST (SGOT) 09/03/2021 28    • Alkaline Phosphatase 09/03/2021 61    • Total Bilirubin 09/03/2021 0.3    • eGFR Non  Amer 09/03/2021 81    • Globulin 09/03/2021 2.6    • A/G Ratio 09/03/2021 1.6    • BUN/Creatinine Ratio 09/03/2021 22.8    • Anion Gap 09/03/2021 8.0    • Hemoglobin A1C 09/03/2021 5.9*     Recent labs reviewed and interpreted for clinical significance and application             Level of Care:           Tristan Kumar MD  12/21/21  .

## 2022-01-02 RX ORDER — LISINOPRIL 5 MG/1
TABLET ORAL
Qty: 90 TABLET | Refills: 1 | OUTPATIENT
Start: 2022-01-02

## 2022-01-03 ENCOUNTER — CLINICAL SUPPORT (OUTPATIENT)
Dept: FAMILY MEDICINE CLINIC | Facility: CLINIC | Age: 72
End: 2022-01-03

## 2022-01-03 DIAGNOSIS — E29.1 HYPOGONADISM MALE: ICD-10-CM

## 2022-01-03 PROCEDURE — 96372 THER/PROPH/DIAG INJ SC/IM: CPT | Performed by: NURSE PRACTITIONER

## 2022-01-03 RX ADMIN — TESTOSTERONE CYPIONATE 200 MG: 200 INJECTION, SOLUTION INTRAMUSCULAR at 09:36

## 2022-01-06 RX ORDER — LEVOTHYROXINE SODIUM 0.1 MG/1
TABLET ORAL
Qty: 90 TABLET | Refills: 0 | Status: SHIPPED | OUTPATIENT
Start: 2022-01-06 | End: 2022-04-11

## 2022-01-13 RX ORDER — POTASSIUM CHLORIDE 20 MEQ/1
20 TABLET, EXTENDED RELEASE ORAL 2 TIMES DAILY
Qty: 60 TABLET | Refills: 1 | Status: SHIPPED | OUTPATIENT
Start: 2022-01-13 | End: 2022-06-02

## 2022-01-17 ENCOUNTER — OFFICE VISIT (OUTPATIENT)
Dept: FAMILY MEDICINE CLINIC | Facility: CLINIC | Age: 72
End: 2022-01-17

## 2022-01-17 VITALS
WEIGHT: 204.6 LBS | TEMPERATURE: 97.8 F | DIASTOLIC BLOOD PRESSURE: 72 MMHG | SYSTOLIC BLOOD PRESSURE: 128 MMHG | BODY MASS INDEX: 27.71 KG/M2 | RESPIRATION RATE: 18 BRPM | OXYGEN SATURATION: 95 % | HEART RATE: 75 BPM | HEIGHT: 72 IN

## 2022-01-17 DIAGNOSIS — E34.9 TESTOSTERONE DEFICIENCY: ICD-10-CM

## 2022-01-17 DIAGNOSIS — E29.1 HYPOGONADISM IN MALE: ICD-10-CM

## 2022-01-17 DIAGNOSIS — I25.5 ISCHEMIC CARDIOMYOPATHY: ICD-10-CM

## 2022-01-17 DIAGNOSIS — I25.10 CORONARY ARTERY DISEASE INVOLVING NATIVE CORONARY ARTERY OF NATIVE HEART WITHOUT ANGINA PECTORIS: Primary | ICD-10-CM

## 2022-01-17 DIAGNOSIS — M05.79 RHEUMATOID ARTHRITIS INVOLVING MULTIPLE SITES WITH POSITIVE RHEUMATOID FACTOR: ICD-10-CM

## 2022-01-17 DIAGNOSIS — E11.65 TYPE 2 DIABETES MELLITUS WITH HYPERGLYCEMIA, WITHOUT LONG-TERM CURRENT USE OF INSULIN: ICD-10-CM

## 2022-01-17 DIAGNOSIS — J32.9 CHRONIC SINUSITIS, UNSPECIFIED LOCATION: ICD-10-CM

## 2022-01-17 DIAGNOSIS — I10 ESSENTIAL HYPERTENSION: ICD-10-CM

## 2022-01-17 PROCEDURE — 96372 THER/PROPH/DIAG INJ SC/IM: CPT | Performed by: NURSE PRACTITIONER

## 2022-01-17 PROCEDURE — 99214 OFFICE O/P EST MOD 30 MIN: CPT | Performed by: NURSE PRACTITIONER

## 2022-01-17 RX ORDER — OXYCODONE AND ACETAMINOPHEN 10; 325 MG/1; MG/1
TABLET ORAL EVERY 8 HOURS SCHEDULED
COMMUNITY

## 2022-01-17 RX ORDER — AMOXICILLIN AND CLAVULANATE POTASSIUM 875; 125 MG/1; MG/1
1 TABLET, FILM COATED ORAL 2 TIMES DAILY
Qty: 20 TABLET | Refills: 0 | Status: SHIPPED | OUTPATIENT
Start: 2022-01-17 | End: 2022-03-14

## 2022-01-17 RX ORDER — AZELASTINE HYDROCHLORIDE 0.5 MG/ML
SOLUTION/ DROPS OPHTHALMIC
COMMUNITY
Start: 2021-12-21

## 2022-01-17 RX ORDER — PREGABALIN 75 MG/1
CAPSULE ORAL
COMMUNITY
End: 2022-01-17

## 2022-01-17 RX ORDER — ETANERCEPT 50 MG/ML
SOLUTION SUBCUTANEOUS
COMMUNITY
Start: 2021-07-28 | End: 2022-01-17

## 2022-01-17 RX ORDER — TRIAMCINOLONE ACETONIDE 1 MG/G
CREAM TOPICAL
COMMUNITY
Start: 2021-12-01

## 2022-01-17 RX ADMIN — TESTOSTERONE CYPIONATE 200 MG: 200 INJECTION, SOLUTION INTRAMUSCULAR at 10:27

## 2022-01-17 NOTE — PROGRESS NOTES
"Chief Complaint  Diabetes (med checks) and Testosterone Deficiency (t-shot @ appt)  Subjective        Arik August presents to [facility]  Pt comes in today discuss medications and recent cardiology appt.   Says he used to see Dr. Gardiner, and now has been assigned to Dr. Kumar. He was recently having chest pain and had stress myoview. He is concerned and upset about some of his recent medication changes. Looks like he was placed on entresto, but then developed nosebleeds. Was in hospital ER for this, and then saw ENT and had cauterization. Nosebleeds have since stopped and entresto was stopped and placed on lisinopril 5mg.   Dr. Kumar had also placed him on bumex 2mg bid, but pt is only taking it once daily.   He has been experiencing some lightheadedness and concerned about the combo of medication.   He sees rheum for RA and on methotrexate and colchicine for that, but not sure he needs it.   Sees pain management and was recently there and there was talk of switching him to xtampza ER, but he wanted to discuss it with me first.   Also requesting rx for augmentin because he gets recurrent sinus infections and causes him to have thick eye discharge and this has worked for him in the past. Usually prescribed by eye MD.        Objective     Vital Signs:   /72   Pulse 75   Temp 97.8 °F (36.6 °C)   Resp 18   Ht 182.9 cm (72\")   Wt 92.8 kg (204 lb 9.6 oz)   SpO2 95%   BMI 27.75 kg/m²       BP Readings from Last 3 Encounters:   01/17/22 128/72   12/21/21 124/72   12/17/21 137/64       Wt Readings from Last 3 Encounters:   01/17/22 92.8 kg (204 lb 9.6 oz)   12/21/21 99.2 kg (218 lb 9.6 oz)   12/17/21 96.6 kg (213 lb)     Physical Exam  Constitutional:       Appearance: He is well-developed.   Eyes:      Pupils: Pupils are equal, round, and reactive to light.   Cardiovascular:      Rate and Rhythm: Normal rate and regular rhythm.   Pulmonary:      Effort: Pulmonary effort is " normal.      Breath sounds: Normal breath sounds.   Neurological:      Mental Status: He is alert and oriented to person, place, and time.        Result Review :                 Assessment and Plan    Diagnoses and all orders for this visit:    1. Coronary artery disease involving native coronary artery of native heart without angina pectoris (Primary)  Assessment & Plan:  Managed by cardiology      2. Testosterone deficiency  Assessment & Plan:  Will check levels in 2 weeks prior to next injection       3. Chronic sinusitis, unspecified location  -     amoxicillin-clavulanate (Augmentin) 875-125 MG per tablet; Take 1 tablet by mouth 2 (Two) Times a Day.  Dispense: 20 tablet; Refill: 0    4. Hypogonadism in male  -     Testosterone (Free & Total), LC / MS; Future    5. Essential hypertension  Assessment & Plan:  Hypertension is improving with treatment.  Continue current treatment regimen.  Blood pressure will be reassessed at the next regular appointment.      6. Ischemic cardiomyopathy  Assessment & Plan:  Pt is requesting 2nd opinion. Recommend Dr. Barney or Dr. Lala. He will call for an appt.       7. Type 2 diabetes mellitus with hyperglycemia, without long-term current use of insulin (HCC)  Assessment & Plan:  Not currently on medication and doesn't want to take anything. Trying to manage with diet. Will be due for repeat labs in March.       8. Rheumatoid arthritis involving multiple sites with positive rheumatoid factor (HCC)  Assessment & Plan:  Follow up with rheum to discuss medications.     reviewed meds with pt, but he is also going to need to follow up with cardiology and rheumatology for this  Check t-level in 2 weeks  MWV in 3 months  During this office visit, we discussed the pertinent aspects of the visit and treatment recommendations. Pt verbalizes understanding. Follow up was discussed. Patient was given the opportunity to ask questions and discuss other concerns.   Discussed importance of  regular exercise and recommended starting or continuing a regular exercise program for good health. The patient was also encouraged to lose weight for better health.     I spent 33 minutes caring for Arik on this date of service. This time includes time spent by me in the following activities:preparing for the visit, reviewing tests, obtaining and/or reviewing a separately obtained history, performing a medically appropriate examination and/or evaluation , counseling and educating the patient/family/caregiver, referring and communicating with other health care professionals  and documenting information in the medical record    Follow Up   Return in about 3 months (around 4/17/2022) for Medicare Wellness.  Patient was given instructions and counseling regarding his condition or for health maintenance advice. Please see specific information pulled into the AVS if appropriate.

## 2022-01-17 NOTE — ASSESSMENT & PLAN NOTE
Not currently on medication and doesn't want to take anything. Trying to manage with diet. Will be due for repeat labs in March.

## 2022-01-31 ENCOUNTER — CLINICAL SUPPORT (OUTPATIENT)
Dept: FAMILY MEDICINE CLINIC | Facility: CLINIC | Age: 72
End: 2022-01-31

## 2022-01-31 ENCOUNTER — LAB (OUTPATIENT)
Dept: FAMILY MEDICINE CLINIC | Facility: CLINIC | Age: 72
End: 2022-01-31

## 2022-01-31 DIAGNOSIS — E29.1 HYPOGONADISM IN MALE: ICD-10-CM

## 2022-01-31 PROCEDURE — 84402 ASSAY OF FREE TESTOSTERONE: CPT | Performed by: NURSE PRACTITIONER

## 2022-01-31 PROCEDURE — 36415 COLL VENOUS BLD VENIPUNCTURE: CPT

## 2022-01-31 PROCEDURE — 96372 THER/PROPH/DIAG INJ SC/IM: CPT | Performed by: NURSE PRACTITIONER

## 2022-01-31 PROCEDURE — 84403 ASSAY OF TOTAL TESTOSTERONE: CPT | Performed by: NURSE PRACTITIONER

## 2022-01-31 RX ADMIN — TESTOSTERONE CYPIONATE 200 MG: 200 INJECTION, SOLUTION INTRAMUSCULAR at 09:41

## 2022-02-03 LAB
TESTOST FREE SERPL-MCNC: 10.7 PG/ML (ref 6.6–18.1)
TESTOST SERPL-MCNC: 494.9 NG/DL (ref 264–916)

## 2022-02-07 RX ORDER — MAGNESIUM OXIDE 400 MG/1
TABLET ORAL
Qty: 90 TABLET | Refills: 0 | Status: SHIPPED | OUTPATIENT
Start: 2022-02-07

## 2022-02-14 ENCOUNTER — CLINICAL SUPPORT (OUTPATIENT)
Dept: FAMILY MEDICINE CLINIC | Facility: CLINIC | Age: 72
End: 2022-02-14

## 2022-02-14 DIAGNOSIS — E34.9 TESTOSTERONE DEFICIENCY: ICD-10-CM

## 2022-02-14 PROCEDURE — 96372 THER/PROPH/DIAG INJ SC/IM: CPT | Performed by: NURSE PRACTITIONER

## 2022-02-14 RX ORDER — TAMSULOSIN HYDROCHLORIDE 0.4 MG/1
CAPSULE ORAL
Qty: 90 CAPSULE | Refills: 0 | Status: SHIPPED | OUTPATIENT
Start: 2022-02-14 | End: 2022-05-16

## 2022-02-14 RX ORDER — DICLOFENAC SODIUM 75 MG/1
TABLET, DELAYED RELEASE ORAL
Qty: 180 TABLET | Refills: 0 | Status: SHIPPED | OUTPATIENT
Start: 2022-02-14 | End: 2022-03-14

## 2022-02-14 RX ADMIN — TESTOSTERONE CYPIONATE 200 MG: 200 INJECTION, SOLUTION INTRAMUSCULAR at 09:20

## 2022-03-07 ENCOUNTER — LAB (OUTPATIENT)
Dept: FAMILY MEDICINE CLINIC | Facility: CLINIC | Age: 72
End: 2022-03-07

## 2022-03-07 DIAGNOSIS — E11.40 TYPE 2 DIABETES MELLITUS WITH DIABETIC NEUROPATHY, WITHOUT LONG-TERM CURRENT USE OF INSULIN: ICD-10-CM

## 2022-03-07 DIAGNOSIS — E78.2 MIXED HYPERLIPIDEMIA: ICD-10-CM

## 2022-03-07 DIAGNOSIS — E03.8 OTHER SPECIFIED HYPOTHYROIDISM: ICD-10-CM

## 2022-03-07 DIAGNOSIS — I11.0 HYPERTENSIVE HEART DISEASE WITH HEART FAILURE: ICD-10-CM

## 2022-03-07 DIAGNOSIS — Z12.5 SCREENING FOR PROSTATE CANCER: ICD-10-CM

## 2022-03-07 DIAGNOSIS — E29.1 TESTICULAR INSUFFICIENCY: Primary | ICD-10-CM

## 2022-03-07 LAB
ALBUMIN SERPL-MCNC: 4.5 G/DL (ref 3.5–5.2)
ALBUMIN/GLOB SERPL: 1.7 G/DL
ALP SERPL-CCNC: 63 U/L (ref 39–117)
ALT SERPL W P-5'-P-CCNC: 27 U/L (ref 1–41)
ANION GAP SERPL CALCULATED.3IONS-SCNC: 8.6 MMOL/L (ref 5–15)
AST SERPL-CCNC: 23 U/L (ref 1–40)
BASOPHILS # BLD AUTO: 0.05 10*3/MM3 (ref 0–0.2)
BASOPHILS NFR BLD AUTO: 0.7 % (ref 0–1.5)
BILIRUB SERPL-MCNC: 0.6 MG/DL (ref 0–1.2)
BUN SERPL-MCNC: 27 MG/DL (ref 8–23)
BUN/CREAT SERPL: 28.7 (ref 7–25)
CALCIUM SPEC-SCNC: 9.7 MG/DL (ref 8.6–10.5)
CHLORIDE SERPL-SCNC: 100 MMOL/L (ref 98–107)
CHOLEST SERPL-MCNC: 129 MG/DL (ref 0–200)
CO2 SERPL-SCNC: 28.4 MMOL/L (ref 22–29)
CREAT SERPL-MCNC: 0.94 MG/DL (ref 0.76–1.27)
DEPRECATED RDW RBC AUTO: 49.3 FL (ref 37–54)
EGFRCR SERPLBLD CKD-EPI 2021: 87.2 ML/MIN/1.73
EOSINOPHIL # BLD AUTO: 0.24 10*3/MM3 (ref 0–0.4)
EOSINOPHIL NFR BLD AUTO: 3.2 % (ref 0.3–6.2)
ERYTHROCYTE [DISTWIDTH] IN BLOOD BY AUTOMATED COUNT: 13.8 % (ref 12.3–15.4)
GLOBULIN UR ELPH-MCNC: 2.6 GM/DL
GLUCOSE SERPL-MCNC: 106 MG/DL (ref 65–99)
HBA1C MFR BLD: 6 % (ref 3.5–5.6)
HCT VFR BLD AUTO: 44.6 % (ref 37.5–51)
HDLC SERPL-MCNC: 36 MG/DL (ref 40–60)
HGB BLD-MCNC: 15.3 G/DL (ref 13–17.7)
IMM GRANULOCYTES # BLD AUTO: 0.03 10*3/MM3 (ref 0–0.05)
IMM GRANULOCYTES NFR BLD AUTO: 0.4 % (ref 0–0.5)
LDLC SERPL CALC-MCNC: 74 MG/DL (ref 0–100)
LDLC/HDLC SERPL: 2.02 {RATIO}
LYMPHOCYTES # BLD AUTO: 1.14 10*3/MM3 (ref 0.7–3.1)
LYMPHOCYTES NFR BLD AUTO: 15.3 % (ref 19.6–45.3)
MCH RBC QN AUTO: 34.5 PG (ref 26.6–33)
MCHC RBC AUTO-ENTMCNC: 34.3 G/DL (ref 31.5–35.7)
MCV RBC AUTO: 100.5 FL (ref 79–97)
MONOCYTES # BLD AUTO: 0.51 10*3/MM3 (ref 0.1–0.9)
MONOCYTES NFR BLD AUTO: 6.8 % (ref 5–12)
NEUTROPHILS NFR BLD AUTO: 5.48 10*3/MM3 (ref 1.7–7)
NEUTROPHILS NFR BLD AUTO: 73.6 % (ref 42.7–76)
NRBC BLD AUTO-RTO: 0 /100 WBC (ref 0–0.2)
PLATELET # BLD AUTO: 269 10*3/MM3 (ref 140–450)
PMV BLD AUTO: 9.9 FL (ref 6–12)
POTASSIUM SERPL-SCNC: 5.1 MMOL/L (ref 3.5–5.2)
PROT SERPL-MCNC: 7.1 G/DL (ref 6–8.5)
PSA SERPL-MCNC: 0.93 NG/ML (ref 0–4)
RBC # BLD AUTO: 4.44 10*6/MM3 (ref 4.14–5.8)
SODIUM SERPL-SCNC: 137 MMOL/L (ref 136–145)
TRIGL SERPL-MCNC: 101 MG/DL (ref 0–150)
TSH SERPL DL<=0.05 MIU/L-ACNC: 0.91 UIU/ML (ref 0.27–4.2)
VLDLC SERPL-MCNC: 19 MG/DL (ref 5–40)
WBC NRBC COR # BLD: 7.45 10*3/MM3 (ref 3.4–10.8)

## 2022-03-07 PROCEDURE — 84443 ASSAY THYROID STIM HORMONE: CPT | Performed by: NURSE PRACTITIONER

## 2022-03-07 PROCEDURE — 80053 COMPREHEN METABOLIC PANEL: CPT | Performed by: NURSE PRACTITIONER

## 2022-03-07 PROCEDURE — 85025 COMPLETE CBC W/AUTO DIFF WBC: CPT | Performed by: NURSE PRACTITIONER

## 2022-03-07 PROCEDURE — 36415 COLL VENOUS BLD VENIPUNCTURE: CPT

## 2022-03-07 PROCEDURE — G0103 PSA SCREENING: HCPCS | Performed by: NURSE PRACTITIONER

## 2022-03-07 PROCEDURE — 80061 LIPID PANEL: CPT | Performed by: NURSE PRACTITIONER

## 2022-03-07 PROCEDURE — 83036 HEMOGLOBIN GLYCOSYLATED A1C: CPT | Performed by: NURSE PRACTITIONER

## 2022-03-14 ENCOUNTER — OFFICE VISIT (OUTPATIENT)
Dept: FAMILY MEDICINE CLINIC | Facility: CLINIC | Age: 72
End: 2022-03-14

## 2022-03-14 VITALS
RESPIRATION RATE: 17 BRPM | HEART RATE: 66 BPM | WEIGHT: 210 LBS | DIASTOLIC BLOOD PRESSURE: 70 MMHG | BODY MASS INDEX: 28.44 KG/M2 | TEMPERATURE: 97.1 F | HEIGHT: 72 IN | SYSTOLIC BLOOD PRESSURE: 126 MMHG | OXYGEN SATURATION: 96 %

## 2022-03-14 DIAGNOSIS — M05.79 RHEUMATOID ARTHRITIS INVOLVING MULTIPLE SITES WITH POSITIVE RHEUMATOID FACTOR: ICD-10-CM

## 2022-03-14 DIAGNOSIS — E29.1 DEFICIENCY OF TESTOSTERONE BIOSYNTHESIS: ICD-10-CM

## 2022-03-14 DIAGNOSIS — I25.10 CORONARY ARTERY DISEASE INVOLVING NATIVE CORONARY ARTERY OF NATIVE HEART WITHOUT ANGINA PECTORIS: ICD-10-CM

## 2022-03-14 DIAGNOSIS — E11.65 TYPE 2 DIABETES MELLITUS WITH HYPERGLYCEMIA, WITHOUT LONG-TERM CURRENT USE OF INSULIN: ICD-10-CM

## 2022-03-14 DIAGNOSIS — Z00.00 MEDICARE ANNUAL WELLNESS VISIT, SUBSEQUENT: Primary | ICD-10-CM

## 2022-03-14 PROCEDURE — 99213 OFFICE O/P EST LOW 20 MIN: CPT | Performed by: NURSE PRACTITIONER

## 2022-03-14 PROCEDURE — 1170F FXNL STATUS ASSESSED: CPT | Performed by: NURSE PRACTITIONER

## 2022-03-14 PROCEDURE — 96372 THER/PROPH/DIAG INJ SC/IM: CPT | Performed by: NURSE PRACTITIONER

## 2022-03-14 PROCEDURE — G0439 PPPS, SUBSEQ VISIT: HCPCS | Performed by: NURSE PRACTITIONER

## 2022-03-14 PROCEDURE — 1160F RVW MEDS BY RX/DR IN RCRD: CPT | Performed by: NURSE PRACTITIONER

## 2022-03-14 RX ORDER — BUMETANIDE 2 MG/1
2 TABLET ORAL DAILY
Qty: 90 TABLET | Refills: 1
Start: 2022-03-14 | End: 2022-09-09

## 2022-03-14 RX ORDER — DICLOFENAC SODIUM 75 MG/1
75 TABLET, DELAYED RELEASE ORAL DAILY
Qty: 90 TABLET | Refills: 1
Start: 2022-03-14 | End: 2022-09-09

## 2022-03-14 RX ORDER — ASPIRIN 81 MG/1
81 TABLET, CHEWABLE ORAL DAILY
COMMUNITY

## 2022-03-14 RX ADMIN — TESTOSTERONE CYPIONATE 200 MG: 200 INJECTION, SOLUTION INTRAMUSCULAR at 08:57

## 2022-03-14 NOTE — PROGRESS NOTES
The ABCs of the Annual Wellness Visit  Subsequent Medicare Wellness Visit    Chief Complaint   Patient presents with   • Medicare Wellness-subsequent      Subjective    History of Present Illness:  Arik August is a 71 y.o. male who presents for a Subsequent Medicare Wellness Visit and other concerns  1. Still having occasional nose bleeds. Originally thought it was associated with taking entresto. Stopped it and nosebleeds improved, but now getting them again  2. Had asked for a 2nd opinion with cardiology, but hasn't done so yet. Was concerned about med changes that Dr. Kumar had made. The main concern was with his metoprolol. Currently taking metoprolol succinate 25mg bid, and used to take tartrate 25mg bid. The new dose is causing dizziness and lightheadedness. Has not reached out to him to see about switching.   3. Recently had surgery on right hand and arm for carpal and cubital tunnel release. Doing better.     The following portions of the patient's history were reviewed and   updated as appropriate: allergies, current medications, past family history, past medical history, past social history, past surgical history and problem list.    Compared to one year ago, the patient feels his physical   health is the same.    Compared to one year ago, the patient feels his mental   health is the same.    Recent Hospitalizations:  He was admitted within the past 365 days at 1 hospital.       Current Medical Providers:  Patient Care Team:  Mary Busby APRN as PCP - General (Nurse Practitioner)  Anshul Gardiner MD (Cardiology)  Marvin Mathis MD as Consulting Physician (Rheumatology)  Sven Zendejas MD as Consulting Physician (Orthopedic Surgery)  Bandar So NP (Nurse Practitioner)    Outpatient Medications Prior to Visit   Medication Sig Dispense Refill   • aspirin 81 MG chewable tablet Chew 81 mg Daily.     • azelastine (OPTIVAR) 0.05 % ophthalmic solution      •  cycloSPORINE (RESTASIS) 0.05 % ophthalmic emulsion RESTASIS 0.05 % EMUL     • folic acid (FOLVITE) 1 MG tablet 2 tablets daily     • levothyroxine (SYNTHROID, LEVOTHROID) 100 MCG tablet TAKE ONE TABLET BY MOUTH DAILY 90 tablet 0   • lisinopril (PRINIVIL,ZESTRIL) 10 MG tablet Take 1 tablet by mouth Daily. 90 tablet 3   • magnesium oxide (MAG-OX) 400 MG tablet TAKE ONE TABLET BY MOUTH DAILY 90 tablet 0   • Melatonin 10 MG capsule Take  by mouth Every Night.     • methotrexate 2.5 MG tablet Take  by mouth. 6 tablets weekly     • metoprolol succinate XL (TOPROL-XL) 25 MG 24 hr tablet Take 1 tablet by mouth 2 (Two) Times a Day. 60 tablet 5   • Multiple Vitamins-Minerals (DAILY MULTIVITAMIN) capsule DAILY MULTIVITAMIN CAPS     • ofloxacin (OCUFLOX) 0.3 % ophthalmic solution Administer 1 drop into the left eye 4 (Four) Times a Day.     • oxyCODONE-acetaminophen (PERCOCET)  MG per tablet Every 8 (Eight) Hours.     • potassium chloride (KLOR-CON) 20 MEQ CR tablet Take 1 tablet by mouth 2 (Two) Times a Day. 60 tablet 1   • rosuvastatin (CRESTOR) 20 MG tablet TAKE ONE TABLET BY MOUTH DAILY 90 tablet 1   • tadalafil (Cialis) 20 MG tablet Take 1 tablet by mouth Daily As Needed for Erectile Dysfunction. 30 tablet 1   • tamsulosin (FLOMAX) 0.4 MG capsule 24 hr capsule TAKE ONE CAPSULE BY MOUTH DAILY 90 capsule 0   • triamcinolone (KENALOG) 0.1 % cream      • vitamin B-12 (CYANOCOBALAMIN) 100 MCG tablet Take 50 mcg by mouth Daily.     • bumetanide (BUMEX) 2 MG tablet TAKE ONE TABLET BY MOUTH TWICE A DAY AS NEEDED (Patient taking differently: Daily.) 180 tablet 0   • diclofenac (VOLTAREN) 75 MG EC tablet TAKE ONE TABLET BY MOUTH TWICE A DAY AS NEEDED FOR PAIN (Patient taking differently: Daily.) 180 tablet 0   • amoxicillin-clavulanate (Augmentin) 875-125 MG per tablet Take 1 tablet by mouth 2 (Two) Times a Day. 20 tablet 0   • Aspirin Buf,CaCarb-MgCarb-MgO, 81 MG tablet Daily.     • colchicine 0.6 MG tablet Take 1 tablet by  mouth Daily.     • magnesium oxide (MAGOX) 400 (241.3 Mg) MG tablet tablet TAKE ONE TABLET BY MOUTH DAILY 90 tablet 0     Facility-Administered Medications Prior to Visit   Medication Dose Route Frequency Provider Last Rate Last Admin   • Testosterone Cypionate (DEPOTESTOTERONE CYPIONATE) injection 200 mg  200 mg Intramuscular Q14 Days Abbi Rosales MD   200 mg at 03/14/22 0857       Opioid medication/s are on active medication list.  and I have evaluated his active treatment plan and pain score trends (see table).  There were no vitals filed for this visit.  I have reviewed the chart for potential of high risk medication and harmful drug interactions in the elderly.            Aspirin is on active medication list. Aspirin use is indicated based on review of current medical condition/s. Pros and cons of this therapy have been discussed today. Benefits of this medication outweigh potential harm.  Patient has been encouraged to continue taking this medication.  .      Patient Active Problem List   Diagnosis   • S/P CABG x 5   • Ischemic cardiomyopathy   • Deficiency of testosterone biosynthesis   • Coronary artery disease involving native coronary artery of native heart without angina pectoris   • Mixed hyperlipidemia   • Essential hypertension   • Type 2 diabetes mellitus with hyperglycemia, without long-term current use of insulin (HCC)   • Failed orthopedic implant (HCC)   • Lumbar post-laminectomy syndrome   • Myofascial pain   • Pseudarthrosis following spinal fusion   • Thoracic back pain   • Weakness of both legs   • Thoracic spine fracture (HCC)   • Pseudoarthrosis of lumbar spine   • Hypothyroidism   • Cubital tunnel syndrome   • Acute blood loss as cause of postoperative anemia   • Mechanical complication of orthopedic internal fixation device (HCC)   • Rheumatoid arthritis involving multiple sites with positive rheumatoid factor (HCC)   • Testosterone deficiency     Advance Care Planning  Advance  "Directive is not on file.  ACP discussion was held with the patient during this visit. Patient does not have an advance directive, information provided.          Objective    Vitals:    22 0829   BP: 126/70   Pulse: 66   Resp: 17   Temp: 97.1 °F (36.2 °C)   SpO2: 96%   Weight: 95.3 kg (210 lb)   Height: 182.9 cm (72\")     BMI Readings from Last 1 Encounters:   22 28.48 kg/m²   BMI is above normal parameters. Recommendations include: exercise counseling    Does the patient have evidence of cognitive impairment? No    Physical Exam  Constitutional:       Appearance: He is well-developed. He is obese.   HENT:      Head: Normocephalic.   Eyes:      Conjunctiva/sclera: Conjunctivae normal.      Pupils: Pupils are equal, round, and reactive to light.   Neck:      Thyroid: No thyromegaly.   Cardiovascular:      Rate and Rhythm: Normal rate and regular rhythm.      Heart sounds: No murmur heard.  Pulmonary:      Effort: Pulmonary effort is normal.      Breath sounds: Normal breath sounds.   Abdominal:      General: Bowel sounds are normal.      Palpations: Abdomen is soft.      Tenderness: There is no abdominal tenderness.   Musculoskeletal:         General: Normal range of motion.      Cervical back: Normal range of motion and neck supple.   Skin:     General: Skin is warm and dry.      Findings: No lesion.   Neurological:      Mental Status: He is alert and oriented to person, place, and time.   Psychiatric:         Behavior: Behavior normal.       Lab Results   Component Value Date    TRIG 101 2022    HDL 36 (L) 2022    LDL 74 2022    VLDL 19 2022    HGBA1C 6.0 (H) 2022            HEALTH RISK ASSESSMENT    Smoking Status:  Social History     Tobacco Use   Smoking Status Former Smoker   • Packs/day: 1.50   • Years: 15.00   • Pack years: 22.50   • Types: Cigarettes   • Quit date: 1992   • Years since quittin.8   Smokeless Tobacco Never Used     Alcohol Consumption:  Social " History     Substance and Sexual Activity   Alcohol Use Not Currently    Comment: Occ Beer     Fall Risk Screen:    STEADI Fall Risk Assessment was completed, and patient is at MODERATE risk for falls. Assessment completed on:3/14/2022    Depression Screening:  PHQ-2/PHQ-9 Depression Screening 3/14/2022   Retired Total Score -   Little Interest or Pleasure in Doing Things 1-->several days   Feeling Down, Depressed or Hopeless 0-->not at all   PHQ-9: Brief Depression Severity Measure Score 1       Health Habits and Functional and Cognitive Screening:  Functional & Cognitive Status 3/14/2022   Do you have difficulty preparing food and eating? No   Do you have difficulty bathing yourself, getting dressed or grooming yourself? No   Do you have difficulty using the toilet? No   Do you have difficulty moving around from place to place? Yes   Do you have trouble with steps or getting out of a bed or a chair? Yes   Current Diet Limited Junk Food   Dental Exam Up to date   Eye Exam Up to date   Exercise (times per week) 3 times per week   Current Exercises Include Walking;Other   Do you need help using the phone?  No   Are you deaf or do you have serious difficulty hearing?  No   Do you need help with transportation? Yes   Do you need help shopping? Yes   Do you need help preparing meals?  No   Do you need help with housework?  No   Do you need help with laundry? No   Do you need help taking your medications? No   Do you need help managing money? No   Do you ever drive or ride in a car without wearing a seat belt? No   Have you felt unusual stress, anger or loneliness in the last month? No   Who do you live with? Spouse   If you need help, do you have trouble finding someone available to you? No   Have you been bothered in the last four weeks by sexual problems? No   Do you have difficulty concentrating, remembering or making decisions? No       Age-appropriate Screening Schedule:  Refer to the list below for future  screening recommendations based on patient's age, sex and/or medical conditions. Orders for these recommended tests are listed in the plan section. The patient has been provided with a written plan.    Health Maintenance   Topic Date Due   • ZOSTER VACCINE (1 of 2) 03/14/2022 (Originally 7/15/2001)   • DIABETIC FOOT EXAM  03/29/2022 (Originally 12/26/2018)   • URINE MICROALBUMIN  03/15/2022   • DIABETIC EYE EXAM  08/31/2022   • HEMOGLOBIN A1C  09/07/2022   • LIPID PANEL  03/07/2023   • TDAP/TD VACCINES (2 - Td or Tdap) 03/05/2031   • INFLUENZA VACCINE  Completed              Assessment/Plan   CMS Preventative Services Quick Reference  Risk Factors Identified During Encounter  Cardiovascular Disease  Chronic Pain   Depression/Dysphoria  Drug Use/Abuse Identified or Suspected  Inactivity/Sedentary  Obesity/Overweight   Polypharmacy  The above risks/problems have been discussed with the patient.  Follow up actions/plans if indicated are seen below in the Assessment/Plan Section.  Pertinent information has been shared with the patient in the After Visit Summary.    Diagnoses and all orders for this visit:    1. Medicare annual wellness visit, subsequent (Primary)    2. Deficiency of testosterone biosynthesis    3. Rheumatoid arthritis involving multiple sites with positive rheumatoid factor (HCC)    4. Type 2 diabetes mellitus with hyperglycemia, without long-term current use of insulin (HCC)  Assessment & Plan:  Diabetes is unchanged.   Dietary recommendations for ADA diet.  Diabetes will be reassessed in 6 months.  Cont to work on diet.   A1C Last 3 Results    HGBA1C Last 3 Results 6/10/21 9/3/21 3/7/22   Hemoglobin A1C 6.5 (A) 5.9 (A) 6.0 (A)   (A) Abnormal value                5. Coronary artery disease involving native coronary artery of native heart without angina pectoris  Assessment & Plan:  Managed by cardiology        Other orders  -     bumetanide (BUMEX) 2 MG tablet; Take 1 tablet by mouth Daily.  Dispense:  90 tablet; Refill: 1  -     diclofenac (VOLTAREN) 75 MG EC tablet; Take 1 tablet by mouth Daily.  Dispense: 90 tablet; Refill: 1    I will try to reach out to Dr. Kumar about switching his metoprolol  The importance of monitoring blood sugar regularly was reviewed.  The importance of monitoring the HgBA1C level regularly was reviewed.  The importance of monitoring urine microalbumin regularly to check for kidney damage was reviewed.   The importance of annual eye exams to prevent blindness was reviewed.  The importance of proper foot care and regularly checking feet to prevent sores and possibly loss of limbs was reviewed.   Discussed importance of regular exercise and recommended starting or continuing a regular exercise program for good health. The patient was also encouraged to lose weight for better health.   During this visit for their annual exam, we reviewed their personal history, social history and family history. We went over their medications and all the recommended health maintenance items for their age group. They were given the opportunity to ask questions and discuss other concerns.     Follow Up:   Return in about 6 months (around 9/14/2022).     An After Visit Summary and PPPS were made available to the patient.

## 2022-03-14 NOTE — ASSESSMENT & PLAN NOTE
Diabetes is unchanged.   Dietary recommendations for ADA diet.  Diabetes will be reassessed in 6 months.  Cont to work on diet.   A1C Last 3 Results    HGBA1C Last 3 Results 6/10/21 9/3/21 3/7/22   Hemoglobin A1C 6.5 (A) 5.9 (A) 6.0 (A)   (A) Abnormal value

## 2022-03-14 NOTE — PATIENT INSTRUCTIONS
Medicare Wellness  Personal Prevention Plan of Service     Date of Office Visit:    Encounter Provider:  ANTONIA Salas  Place of Service:  [facility]  Patient Name: Arik August  :  1950    As part of the Medicare Wellness portion of your visit today, we are providing you with this personalized preventive plan of services (PPPS). This plan is based upon recommendations of the [place] Preventive Services Task Force (USPSTF) and the Advisory Committee on Immunization Practices (ACIP).    This lists the preventive care services that should be considered, and provides dates of when you are due. Items listed as completed are up-to-date and do not require any further intervention.    Health Maintenance   Topic Date Due    COVID-19 Vaccine (2 - Moderna 3-dose series) 2021    ANNUAL WELLNESS VISIT  2022    AAA SCREEN (ONE-TIME)  2022 (Originally 2019)    ZOSTER VACCINE (1 of 2) 2022 (Originally 7/15/2001)    DIABETIC FOOT EXAM  2022 (Originally 2018)    URINE MICROALBUMIN  03/15/2022    DIABETIC EYE EXAM  2022    HEMOGLOBIN A1C  2022    LIPID PANEL  2023    COLORECTAL CANCER SCREENING  2029    TDAP/TD VACCINES (2 - Td or Tdap) 2031    HEPATITIS C SCREENING  Completed    INFLUENZA VACCINE  Completed    Pneumococcal Vaccine 65+  Completed       No orders of the defined types were placed in this encounter.      Return in about 6 months (around 2022).        Fall Prevention in the Home, Adult  Falls can cause injuries. They can happen to people of all ages. There are many things you can do to make your home safe and to help prevent falls. Ask for help when making these changes, if needed.  What actions can I take to prevent falls?  General Instructions  Use good lighting in all rooms. Replace any light bulbs that burn out.  Turn on the lights when you go into a dark area. Use night-lights.  Keep  items that you use often in easy-to-reach places. Lower the shelves around your home if necessary.  Set up your furniture so you have a clear path. Avoid moving your furniture around.  Do not have throw rugs and other things on the floor that can make you trip.  Avoid walking on wet floors.  If any of your floors are uneven, fix them.  Add color or contrast paint or tape to clearly juana and help you see:  Any grab bars or handrails.  First and last steps of stairways.  Where the edge of each step is.  If you use a stepladder:  Make sure that it is fully opened. Do not climb a closed stepladder.  Make sure that both sides of the stepladder are locked into place.  Ask someone to hold the stepladder for you while you use it.  If there are any pets around you, be aware of where they are.  What can I do in the bathroom?         Keep the floor dry. Clean up any water that spills onto the floor as soon as it happens.  Remove soap buildup in the tub or shower regularly.  Use non-skid mats or decals on the floor of the tub or shower.  Attach bath mats securely with double-sided, non-slip rug tape.  If you need to sit down in the shower, use a plastic, non-slip stool.  Install grab bars by the toilet and in the tub and shower. Do not use towel bars as grab bars.  What can I do in the bedroom?  Make sure that you have a light by your bed that is easy to reach.  Do not use any sheets or blankets that are too big for your bed. They should not hang down onto the floor.  Have a firm chair that has side arms. You can use this for support while you get dressed.  What can I do in the kitchen?  Clean up any spills right away.  If you need to reach something above you, use a strong step stool that has a grab bar.  Keep electrical cords out of the way.  Do not use floor polish or wax that makes floors slippery. If you must use wax, use non-skid floor wax.  What can I do with my stairs?  Do not leave any items on the stairs.  Make sure  that you have a light switch at the top of the stairs and the bottom of the stairs. If you do not have them, ask someone to add them for you.  Make sure that there are handrails on both sides of the stairs, and use them. Fix handrails that are broken or loose. Make sure that handrails are as long as the stairways.  Install non-slip stair treads on all stairs in your home.  Avoid having throw rugs at the top or bottom of the stairs. If you do have throw rugs, attach them to the floor with carpet tape.  Choose a carpet that does not hide the edge of the steps on the stairway.  Check any carpeting to make sure that it is firmly attached to the stairs. Fix any carpet that is loose or worn.  What can I do on the outside of my home?  Use bright outdoor lighting.  Regularly fix the edges of walkways and driveways and fix any cracks.  Remove anything that might make you trip as you walk through a door, such as a raised step or threshold.  Trim any bushes or trees on the path to your home.  Regularly check to see if handrails are loose or broken. Make sure that both sides of any steps have handrails.  Install guardrails along the edges of any raised decks and porches.  Clear walking paths of anything that might make someone trip, such as tools or rocks.  Have any leaves, snow, or ice cleared regularly.  Use sand or salt on walking paths during winter.  Clean up any spills in your garage right away. This includes grease or oil spills.  What other actions can I take?  Wear shoes that:  Have a low heel. Do not wear high heels.  Have rubber bottoms.  Are comfortable and fit you well.  Are closed at the toe. Do not wear open-toe sandals.  Use tools that help you move around (mobility aids) if they are needed. These include:  Canes.  Walkers.  Scooters.  Crutches.  Review your medicines with your doctor. Some medicines can make you feel dizzy. This can increase your chance of falling.  Ask your doctor what other things you can do  to help prevent falls.  Where to find more information  Centers for Disease Control and Prevention, [place]: https://cdc.gov  National [place] on Aging: https://bi4zxkt.renee.nih.gov  Contact a doctor if:  You are afraid of falling at home.  You feel weak, drowsy, or dizzy at home.  You fall at home.  Summary  There are many simple things that you can do to make your home safe and to help prevent falls.  Ways to make your home safe include removing tripping hazards and installing grab bars in the bathroom.  Ask for help when making these changes in your home.  This information is not intended to replace advice given to you by your health care provider. Make sure you discuss any questions you have with your health care provider.  Document Revised: 04/09/2020 Document Reviewed: 08/02/2018  Elsevier Patient Education © 2021 [employer]  Sit-to-Stand Exercise    The sit-to-stand exercise (also known as the chair stand or chair rise exercise) strengthens your lower body and helps you maintain or improve your mobility and independence. The goal is to do the sit-to-stand exercise without using your hands. This will be easier as you become stronger. You should always talk with your health care provider before starting any exercise program, especially if you have had recent surgery.  Do the exercise exactly as told by your health care provider and adjust it as directed. It is normal to feel mild stretching, pulling, tightness, or discomfort as you do this exercise, but you should stop right away if you feel sudden pain or your pain gets worse. Do not begin doing this exercise until told by your health care provider.  What the sit-to-stand exercise does  The sit-to-stand exercise helps to strengthen the muscles in your thighs and the muscles in the center of your body that give you stability (core muscles). This exercise is especially helpful if:  You have had knee or hip surgery.  You have trouble getting up from a chair,  out of a car, or off the toilet.  How to do the sit-to-stand exercise  Sit toward the front edge of a sturdy chair without armrests. Your knees should be bent and your feet should be flat on the floor and shoulder-width apart.  Place your hands lightly on each side of the seat. Keep your back and neck as straight as possible, with your chest slightly forward.  Breathe in slowly. Lean forward and slightly shift your weight to the front of your feet.  Breathe out as you slowly stand up. Use your hands as little as possible.  Stand and pause for a full breath in and out.  Breathe in as you sit down slowly. Tighten your core and abdominal muscles to control your lowering as much as possible.  Breathe out slowly.  Do this exercise 10-15 times. If needed, do it fewer times until you build up strength.  Rest for 1 minute, then do another set of 10-15 repetitions.  To change the difficulty of the sit-to-stand exercise  If the exercise is too difficult, use a chair with sturdy armrests, and push off the armrests to help you come to the standing position. You can also use the armrests to help slowly lower yourself back to sitting. As this gets easier, try to use your arms less. You can also place a firm cushion or pillow on the chair to make the surface higher.  If this exercise is too easy, do not use your arms to help raise or lower yourself. You can also wear a weighted vest, use hand weights, increase your repetitions, or try a lower chair.  General tips  You may feel tired when starting an exercise routine. This is normal.  You may have muscle soreness that lasts a few days. This is normal. As you get stronger, you may not feel muscle soreness.  Use smooth, steady movements.  Do not  hold your breath during strength exercises. This can cause unsafe changes in your blood pressure.  Breathe in slowly through your nose, and breathe out slowly through your mouth.  Summary  Strengthening your lower body is an important step  to help you move safely and independently.  The sit-to-stand exercise helps strengthen the muscles in your thighs and core.  You should always talk with your health care provider before starting any exercise program, especially if you have had recent surgery.  This information is not intended to replace advice given to you by your health care provider. Make sure you discuss any questions you have with your health care provider.  Document Revised: 10/16/2019 Document Reviewed: 02/08/2018  Elsevier Patient Education © 2021 [employer]  Calorie Counting for Weight Loss  Calories are units of energy. Your body needs a certain number of calories from food to keep going throughout the day. When you eat or drink more calories than your body needs, your body stores the extra calories mostly as fat. When you eat or drink fewer calories than your body needs, your body burns fat to get the energy it needs.  Calorie counting means keeping track of how many calories you eat and drink each day. Calorie counting can be helpful if you need to lose weight. If you eat fewer calories than your body needs, you should lose weight. Ask your health care provider what a healthy weight is for you.  For calorie counting to work, you will need to eat the right number of calories each day to lose a healthy amount of weight per week. A dietitian can help you figure out how many calories you need in a day and will suggest ways to reach your calorie goal.  A healthy amount of weight to lose each week is usually 1-2 lb (0.5-0.9 kg). This usually means that your daily calorie intake should be reduced by 500-750 calories.  Eating 1,200-1,500 calories a day can help most women lose weight.  Eating 1,500-1,800 calories a day can help most men lose weight.  What do I need to know about calorie counting?  Work with your health care provider or dietitian to determine how many calories you should get each day. To meet your daily calorie goal, you will  need to:  Find out how many calories are in each food that you would like to eat. Try to do this before you eat.  Decide how much of the food you plan to eat.  Keep a food log. Do this by writing down what you ate and how many calories it had.  To successfully lose weight, it is important to balance calorie counting with a healthy lifestyle that includes regular activity.  Where do I find calorie information?  The number of calories in a food can be found on a Nutrition Facts label. If a food does not have a Nutrition Facts label, try to look up the calories online or ask your dietitian for help.  Remember that calories are listed per serving. If you choose to have more than one serving of a food, you will have to multiply the calories per serving by the number of servings you plan to eat. For example, the label on a package of bread might say that a serving size is 1 slice and that there are 90 calories in a serving. If you eat 1 slice, you will have eaten 90 calories. If you eat 2 slices, you will have eaten 180 calories.    How do I keep a food log?  After each time that you eat, record the following in your food log as soon as possible:  What you ate. Be sure to include toppings, sauces, and other extras on the food.  How much you ate. This can be measured in cups, ounces, or number of items.  How many calories were in each food and drink.  The total number of calories in the food you ate.  Keep your food log near you, such as in a pocket-sized notebook or on an savannah or website on your mobile phone. Some programs will calculate calories for you and show you how many calories you have left to meet your daily goal.  What are some portion-control tips?  Know how many calories are in a serving. This will help you know how many servings you can have of a certain food.  Use a measuring cup to measure serving sizes. You could also try weighing out portions on a kitchen scale. With time, you will be able to estimate  serving sizes for some foods.  Take time to put servings of different foods on your favorite plates or in your favorite bowls and cups so you know what a serving looks like.  Try not to eat straight from a food's packaging, such as from a bag or box. Eating straight from the package makes it hard to see how much you are eating and can lead to overeating. Put the amount you would like to eat in a cup or on a plate to make sure you are eating the right portion.  Use smaller plates, glasses, and bowls for smaller portions and to prevent overeating.  Try not to multitask. For example, avoid watching TV or using your computer while eating. If it is time to eat, sit down at a table and enjoy your food. This will help you recognize when you are full. It will also help you be more mindful of what and how much you are eating.  What are tips for following this plan?  Reading food labels  Check the calorie count compared with the serving size. The serving size may be smaller than what you are used to eating.  Check the source of the calories. Try to choose foods that are high in protein, fiber, and vitamins, and low in saturated fat, trans fat, and sodium.  Shopping  Read nutrition labels while you shop. This will help you make healthy decisions about which foods to buy.  Pay attention to nutrition labels for low-fat or fat-free foods. These foods sometimes have the same number of calories or more calories than the full-fat versions. They also often have added sugar, starch, or salt to make up for flavor that was removed with the fat.  Make a grocery list of lower-calorie foods and stick to it.  Cooking  Try to cook your favorite foods in a healthier way. For example, try baking instead of frying.  Use low-fat dairy products.  Meal planning  Use more fruits and vegetables. One-half of your plate should be fruits and vegetables.  Include lean proteins, such as chicken, turkey, and fish.  Lifestyle  Each week, aim to do one of  the followin minutes of moderate exercise, such as walking.  75 minutes of vigorous exercise, such as running.  General information  Know how many calories are in the foods you eat most often. This will help you calculate calorie counts faster.  Find a way of tracking calories that works for you. Get creative. Try different apps or programs if writing down calories does not work for you.  What foods should I eat?  Eat nutritious foods. It is better to have a nutritious, high-calorie food, such as an avocado, than a food with few nutrients, such as a bag of potato chips.  Use your calories on foods and drinks that will fill you up and will not leave you hungry soon after eating.  Examples of foods that fill you up are nuts and nut butters, vegetables, lean proteins, and high-fiber foods such as whole grains. High-fiber foods are foods with more than 5 g of fiber per serving.  Pay attention to calories in drinks. Low-calorie drinks include water and unsweetened drinks.  The items listed above may not be a complete list of foods and beverages you can eat. Contact a dietitian for more information.    What foods should I limit?  Limit foods or drinks that are not good sources of vitamins, minerals, or protein or that are high in unhealthy fats. These include:  Candy.  Other sweets.  Sodas, specialty coffee drinks, alcohol, and juice.  The items listed above may not be a complete list of foods and beverages you should avoid. Contact a dietitian for more information.  How do I count calories when eating out?  Pay attention to portions. Often, portions are much larger when eating out. Try these tips to keep portions smaller:  Consider sharing a meal instead of getting your own.  If you get your own meal, eat only half of it. Before you start eating, ask for a container and put half of your meal into it.  When available, consider ordering smaller portions from the menu instead of full portions.  Pay attention to your  food and drink choices. Knowing the way food is cooked and what is included with the meal can help you eat fewer calories.  If calories are listed on the menu, choose the lower-calorie options.  Choose dishes that include vegetables, fruits, whole grains, low-fat dairy products, and lean proteins.  Choose items that are boiled, broiled, grilled, or steamed. Avoid items that are buttered, battered, fried, or served with cream sauce. Items labeled as crispy are usually fried, unless stated otherwise.  Choose water, low-fat milk, unsweetened iced tea, or other drinks without added sugar. If you want an alcoholic beverage, choose a lower-calorie option, such as a glass of wine or light beer.  Ask for dressings, sauces, and syrups on the side. These are usually high in calories, so you should limit the amount you eat.  If you want a salad, choose a garden salad and ask for grilled meats. Avoid extra toppings such as abrams, cheese, or fried items. Ask for the dressing on the side, or ask for olive oil and vinegar or lemon to use as dressing.  Estimate how many servings of a food you are given. Knowing serving sizes will help you be aware of how much food you are eating at restaurants.  Where to find more information  Centers for Disease Control and Prevention: www.cdc.gov  U.S. Department of Agriculture: myplate.gov  Summary  Calorie counting means keeping track of how many calories you eat and drink each day. If you eat fewer calories than your body needs, you should lose weight.  A healthy amount of weight to lose per week is usually 1-2 lb (0.5-0.9 kg). This usually means reducing your daily calorie intake by 500-750 calories.  The number of calories in a food can be found on a Nutrition Facts label. If a food does not have a Nutrition Facts label, try to look up the calories online or ask your dietitian for help.  Use smaller plates, glasses, and bowls for smaller portions and to prevent overeating.  Use your calories  on foods and drinks that will fill you up and not leave you hungry shortly after a meal.  This information is not intended to replace advice given to you by your health care provider. Make sure you discuss any questions you have with your health care provider.  Document Revised: 01/28/2021 Document Reviewed: 01/28/2021  [employer] Patient Education © 2021 [employer]  Exercising to Stay Healthy  To become healthy and stay healthy, it is recommended that you do moderate-intensity and vigorous-intensity exercise. You can tell that you are exercising at a moderate intensity if your heart starts beating faster and you start breathing faster but can still hold a conversation. You can tell that you are exercising at a vigorous intensity if you are breathing much harder and faster and cannot hold a conversation while exercising.  Exercising regularly is important. It has many health benefits, such as:  Improving overall fitness, flexibility, and endurance.  Increasing bone density.  Helping with weight control.  Decreasing body fat.  Increasing muscle strength.  Reducing stress and tension.  Improving overall health.  How often should I exercise?  Choose an activity that you enjoy, and set realistic goals. Your health care provider can help you make an activity plan that works for you.  Exercise regularly as told by your health care provider. This may include:  Doing strength training two times a week, such as:  Lifting weights.  Using resistance bands.  Push-ups.  Sit-ups.  Yoga.  Doing a certain intensity of exercise for a given amount of time. Choose from these options:  A total of 150 minutes of moderate-intensity exercise every week.  A total of 75 minutes of vigorous-intensity exercise every week.  A mix of moderate-intensity and vigorous-intensity exercise every week.  Children, pregnant women, people who have not exercised regularly, people who are overweight, and older adults may need to talk with a health care  provider about what activities are safe to do. If you have a medical condition, be sure to talk with your health care provider before you start a new exercise program.  What are some exercise ideas?  Moderate-intensity exercise ideas include:  Walking 1 mile (1.6 km) in about 15 minutes.  Biking.  Hiking.  Golfing.  Dancing.  Water aerobics.  Vigorous-intensity exercise ideas include:  Walking 4.5 miles (7.2 km) or more in about 1 hour.  Jogging or running 5 miles (8 km) in about 1 hour.  Biking 10 miles (16.1 km) or more in about 1 hour.  Lap swimming.  Roller-skating or in-line skating.  Cross-country skiing.  Vigorous competitive sports, such as football, basketball, and soccer.  Jumping rope.  Aerobic dancing.  What are some everyday activities that can help me to get exercise?  Yard work, such as:  Pushing a .  Raking and bagging leaves.  Washing your car.  Pushing a stroller.  Shoveling snow.  Gardening.  Washing windows or floors.  How can I be more active in my day-to-day activities?  Use stairs instead of an elevator.  Take a walk during your lunch break.  If you drive, park your car farther away from your work or school.  If you take public transportation, get off one stop early and walk the rest of the way.  Stand up or walk around during all of your indoor phone calls.  Get up, stretch, and walk around every 30 minutes throughout the day.  Enjoy exercise with a friend. Support to continue exercising will help you keep a regular routine of activity.  What guidelines can I follow while exercising?  Before you start a new exercise program, talk with your health care provider.  Do not exercise so much that you hurt yourself, feel dizzy, or get very short of breath.  Wear comfortable clothes and wear shoes with good support.  Drink plenty of water while you exercise to prevent dehydration or heat stroke.  Work out until your breathing and your heartbeat get faster.  Where to find more  information  U.S. Department of Health and Human Services: www.hhs.gov  Centers for Disease Control and Prevention (CDC): www.cdc.gov  Summary  Exercising regularly is important. It will improve your overall fitness, flexibility, and endurance.  Regular exercise also will improve your overall health. It can help you control your weight, reduce stress, and improve your bone density.  Do not exercise so much that you hurt yourself, feel dizzy, or get very short of breath.  Before you start a new exercise program, talk with your health care provider.  This information is not intended to replace advice given to you by your health care provider. Make sure you discuss any questions you have with your health care provider.  Document Revised: 11/30/2018 Document Reviewed: 11/08/2018  Elsevier Patient Education © 2021 [employer]

## 2022-03-28 ENCOUNTER — CLINICAL SUPPORT (OUTPATIENT)
Dept: FAMILY MEDICINE CLINIC | Facility: CLINIC | Age: 72
End: 2022-03-28

## 2022-03-28 DIAGNOSIS — E29.1 HYPOGONADISM MALE: ICD-10-CM

## 2022-03-28 PROCEDURE — 96372 THER/PROPH/DIAG INJ SC/IM: CPT | Performed by: NURSE PRACTITIONER

## 2022-03-28 RX ADMIN — TESTOSTERONE CYPIONATE 200 MG: 200 INJECTION, SOLUTION INTRAMUSCULAR at 09:23

## 2022-04-11 ENCOUNTER — CLINICAL SUPPORT (OUTPATIENT)
Dept: FAMILY MEDICINE CLINIC | Facility: CLINIC | Age: 72
End: 2022-04-11

## 2022-04-11 DIAGNOSIS — E29.1 HYPOGONADISM MALE: ICD-10-CM

## 2022-04-11 PROCEDURE — 96372 THER/PROPH/DIAG INJ SC/IM: CPT | Performed by: NURSE PRACTITIONER

## 2022-04-11 RX ORDER — LEVOTHYROXINE SODIUM 0.1 MG/1
TABLET ORAL
Qty: 90 TABLET | Refills: 0 | Status: SHIPPED | OUTPATIENT
Start: 2022-04-11 | End: 2022-07-08

## 2022-04-11 RX ADMIN — TESTOSTERONE CYPIONATE 200 MG: 200 INJECTION, SOLUTION INTRAMUSCULAR at 09:09

## 2022-04-25 ENCOUNTER — CLINICAL SUPPORT (OUTPATIENT)
Dept: FAMILY MEDICINE CLINIC | Facility: CLINIC | Age: 72
End: 2022-04-25

## 2022-04-25 DIAGNOSIS — E29.1 HYPOGONADISM MALE: ICD-10-CM

## 2022-04-25 PROCEDURE — 96372 THER/PROPH/DIAG INJ SC/IM: CPT | Performed by: NURSE PRACTITIONER

## 2022-04-25 RX ADMIN — TESTOSTERONE CYPIONATE 200 MG: 200 INJECTION, SOLUTION INTRAMUSCULAR at 09:13

## 2022-05-09 ENCOUNTER — CLINICAL SUPPORT (OUTPATIENT)
Dept: FAMILY MEDICINE CLINIC | Facility: CLINIC | Age: 72
End: 2022-05-09

## 2022-05-09 DIAGNOSIS — E29.1 DEFICIENCY OF TESTOSTERONE BIOSYNTHESIS: ICD-10-CM

## 2022-05-09 PROCEDURE — 96372 THER/PROPH/DIAG INJ SC/IM: CPT | Performed by: NURSE PRACTITIONER

## 2022-05-09 RX ORDER — LANOLIN ALCOHOL/MO/W.PET/CERES
CREAM (GRAM) TOPICAL
Qty: 90 TABLET | Refills: 1 | Status: SHIPPED | OUTPATIENT
Start: 2022-05-09 | End: 2022-10-31

## 2022-05-09 RX ADMIN — TESTOSTERONE CYPIONATE 200 MG: 200 INJECTION, SOLUTION INTRAMUSCULAR at 09:09

## 2022-05-16 RX ORDER — TAMSULOSIN HYDROCHLORIDE 0.4 MG/1
CAPSULE ORAL
Qty: 90 CAPSULE | Refills: 0 | Status: SHIPPED | OUTPATIENT
Start: 2022-05-16 | End: 2022-08-11

## 2022-05-23 ENCOUNTER — CLINICAL SUPPORT (OUTPATIENT)
Dept: FAMILY MEDICINE CLINIC | Facility: CLINIC | Age: 72
End: 2022-05-23

## 2022-05-23 DIAGNOSIS — E29.1 HYPOGONADISM MALE: ICD-10-CM

## 2022-05-23 PROCEDURE — 96372 THER/PROPH/DIAG INJ SC/IM: CPT | Performed by: NURSE PRACTITIONER

## 2022-05-23 RX ADMIN — TESTOSTERONE CYPIONATE 200 MG: 200 INJECTION, SOLUTION INTRAMUSCULAR at 09:15

## 2022-05-31 RX ORDER — METOPROLOL SUCCINATE 25 MG/1
25 TABLET, EXTENDED RELEASE ORAL 2 TIMES DAILY
Qty: 60 TABLET | Refills: 5 | Status: SHIPPED | OUTPATIENT
Start: 2022-05-31 | End: 2023-03-30

## 2022-06-02 RX ORDER — POTASSIUM CHLORIDE 1500 MG/1
TABLET, EXTENDED RELEASE ORAL
Qty: 60 TABLET | Refills: 1 | Status: SHIPPED | OUTPATIENT
Start: 2022-06-02 | End: 2022-10-10

## 2022-06-06 ENCOUNTER — CLINICAL SUPPORT (OUTPATIENT)
Dept: FAMILY MEDICINE CLINIC | Facility: CLINIC | Age: 72
End: 2022-06-06

## 2022-06-06 DIAGNOSIS — E29.1 HYPOGONADISM MALE: ICD-10-CM

## 2022-06-06 PROCEDURE — 96372 THER/PROPH/DIAG INJ SC/IM: CPT | Performed by: NURSE PRACTITIONER

## 2022-06-06 RX ADMIN — TESTOSTERONE CYPIONATE 200 MG: 200 INJECTION, SOLUTION INTRAMUSCULAR at 09:45

## 2022-06-15 RX ORDER — ROSUVASTATIN CALCIUM 20 MG/1
TABLET, COATED ORAL
Qty: 90 TABLET | Refills: 1 | Status: SHIPPED | OUTPATIENT
Start: 2022-06-15 | End: 2022-12-18

## 2022-06-20 ENCOUNTER — CLINICAL SUPPORT (OUTPATIENT)
Dept: FAMILY MEDICINE CLINIC | Facility: CLINIC | Age: 72
End: 2022-06-20

## 2022-06-20 DIAGNOSIS — E34.9 TESTOSTERONE DEFICIENCY: ICD-10-CM

## 2022-06-20 PROCEDURE — 96372 THER/PROPH/DIAG INJ SC/IM: CPT | Performed by: NURSE PRACTITIONER

## 2022-06-20 RX ADMIN — TESTOSTERONE CYPIONATE 200 MG: 200 INJECTION, SOLUTION INTRAMUSCULAR at 09:11

## 2022-06-29 ENCOUNTER — TELEPHONE (OUTPATIENT)
Dept: FAMILY MEDICINE CLINIC | Facility: CLINIC | Age: 72
End: 2022-06-29

## 2022-06-29 NOTE — TELEPHONE ENCOUNTER
Caller: SHELLI ISSA    Relationship to patient: Emergency Contact    Best call back number: [phone]     Patient is needing:     SHELLI WOULD LIKE TO KNOW IF WILMAN CAN BRING HIS 2 GRANDCHILDREN TO THE OFFICE 7/18/2022 WHILE HE IS GETTING T SHOT, AGES 9 AND 6 YRS.

## 2022-07-05 ENCOUNTER — CLINICAL SUPPORT (OUTPATIENT)
Dept: FAMILY MEDICINE CLINIC | Facility: CLINIC | Age: 72
End: 2022-07-05

## 2022-07-05 DIAGNOSIS — E29.1 DEFICIENCY OF TESTOSTERONE BIOSYNTHESIS: ICD-10-CM

## 2022-07-05 PROCEDURE — 96372 THER/PROPH/DIAG INJ SC/IM: CPT | Performed by: NURSE PRACTITIONER

## 2022-07-05 RX ADMIN — TESTOSTERONE CYPIONATE 200 MG: 200 INJECTION, SOLUTION INTRAMUSCULAR at 09:28

## 2022-07-08 RX ORDER — LEVOTHYROXINE SODIUM 0.1 MG/1
TABLET ORAL
Qty: 90 TABLET | Refills: 0 | Status: SHIPPED | OUTPATIENT
Start: 2022-07-08 | End: 2022-10-04

## 2022-07-18 ENCOUNTER — CLINICAL SUPPORT (OUTPATIENT)
Dept: FAMILY MEDICINE CLINIC | Facility: CLINIC | Age: 72
End: 2022-07-18

## 2022-07-18 DIAGNOSIS — E34.9 TESTOSTERONE DEFICIENCY: ICD-10-CM

## 2022-07-18 PROCEDURE — 96372 THER/PROPH/DIAG INJ SC/IM: CPT | Performed by: NURSE PRACTITIONER

## 2022-07-18 RX ADMIN — TESTOSTERONE CYPIONATE 200 MG: 200 INJECTION, SOLUTION INTRAMUSCULAR at 09:15

## 2022-08-01 ENCOUNTER — CLINICAL SUPPORT (OUTPATIENT)
Dept: FAMILY MEDICINE CLINIC | Facility: CLINIC | Age: 72
End: 2022-08-01

## 2022-08-01 DIAGNOSIS — E34.9 TESTOSTERONE DEFICIENCY: ICD-10-CM

## 2022-08-01 PROCEDURE — 96372 THER/PROPH/DIAG INJ SC/IM: CPT | Performed by: NURSE PRACTITIONER

## 2022-08-01 RX ADMIN — TESTOSTERONE CYPIONATE 200 MG: 200 INJECTION, SOLUTION INTRAMUSCULAR at 09:14

## 2022-08-11 RX ORDER — PANTOPRAZOLE SODIUM 40 MG/1
TABLET, DELAYED RELEASE ORAL
Qty: 90 TABLET | Refills: 2 | Status: SHIPPED | OUTPATIENT
Start: 2022-08-11

## 2022-08-11 RX ORDER — TAMSULOSIN HYDROCHLORIDE 0.4 MG/1
CAPSULE ORAL
Qty: 90 CAPSULE | Refills: 0 | Status: SHIPPED | OUTPATIENT
Start: 2022-08-11 | End: 2022-11-09

## 2022-08-15 ENCOUNTER — CLINICAL SUPPORT (OUTPATIENT)
Dept: FAMILY MEDICINE CLINIC | Facility: CLINIC | Age: 72
End: 2022-08-15

## 2022-08-15 DIAGNOSIS — E29.1 DEFICIENCY OF TESTOSTERONE BIOSYNTHESIS: ICD-10-CM

## 2022-08-15 PROCEDURE — 96372 THER/PROPH/DIAG INJ SC/IM: CPT | Performed by: NURSE PRACTITIONER

## 2022-08-15 RX ADMIN — TESTOSTERONE CYPIONATE 200 MG: 200 INJECTION, SOLUTION INTRAMUSCULAR at 09:12

## 2022-08-29 ENCOUNTER — CLINICAL SUPPORT (OUTPATIENT)
Dept: FAMILY MEDICINE CLINIC | Facility: CLINIC | Age: 72
End: 2022-08-29

## 2022-08-29 DIAGNOSIS — E29.1 HYPOGONADISM MALE: ICD-10-CM

## 2022-08-29 PROCEDURE — 96372 THER/PROPH/DIAG INJ SC/IM: CPT | Performed by: NURSE PRACTITIONER

## 2022-08-29 RX ADMIN — TESTOSTERONE CYPIONATE 200 MG: 200 INJECTION, SOLUTION INTRAMUSCULAR at 09:13

## 2022-09-09 RX ORDER — DICLOFENAC SODIUM 75 MG/1
TABLET, DELAYED RELEASE ORAL
Qty: 180 TABLET | Refills: 1 | Status: SHIPPED | OUTPATIENT
Start: 2022-09-09

## 2022-09-09 RX ORDER — BUMETANIDE 2 MG/1
TABLET ORAL
Qty: 180 TABLET | Refills: 2 | Status: SHIPPED | OUTPATIENT
Start: 2022-09-09 | End: 2023-03-30

## 2022-09-12 ENCOUNTER — LAB (OUTPATIENT)
Dept: FAMILY MEDICINE CLINIC | Facility: CLINIC | Age: 72
End: 2022-09-12

## 2022-09-12 ENCOUNTER — CLINICAL SUPPORT (OUTPATIENT)
Dept: FAMILY MEDICINE CLINIC | Facility: CLINIC | Age: 72
End: 2022-09-12

## 2022-09-12 DIAGNOSIS — Z00.00 MEDICARE ANNUAL WELLNESS VISIT, SUBSEQUENT: ICD-10-CM

## 2022-09-12 DIAGNOSIS — E34.9 TESTOSTERONE DEFICIENCY: ICD-10-CM

## 2022-09-12 DIAGNOSIS — E29.1 HYPOGONADISM MALE: ICD-10-CM

## 2022-09-12 DIAGNOSIS — E11.65 TYPE 2 DIABETES MELLITUS WITH HYPERGLYCEMIA, WITHOUT LONG-TERM CURRENT USE OF INSULIN: ICD-10-CM

## 2022-09-12 DIAGNOSIS — M05.79 RHEUMATOID ARTHRITIS INVOLVING MULTIPLE SITES WITH POSITIVE RHEUMATOID FACTOR: Primary | ICD-10-CM

## 2022-09-12 LAB
ALBUMIN SERPL-MCNC: 4.7 G/DL (ref 3.5–5.2)
ALBUMIN/GLOB SERPL: 1.7 G/DL
ALP SERPL-CCNC: 62 U/L (ref 39–117)
ALT SERPL W P-5'-P-CCNC: 29 U/L (ref 1–41)
ANION GAP SERPL CALCULATED.3IONS-SCNC: 13.1 MMOL/L (ref 5–15)
AST SERPL-CCNC: 30 U/L (ref 1–40)
BASOPHILS # BLD AUTO: 0.04 10*3/MM3 (ref 0–0.2)
BASOPHILS NFR BLD AUTO: 0.5 % (ref 0–1.5)
BILIRUB SERPL-MCNC: 0.6 MG/DL (ref 0–1.2)
BUN SERPL-MCNC: 19 MG/DL (ref 8–23)
BUN/CREAT SERPL: 17.3 (ref 7–25)
CALCIUM SPEC-SCNC: 9.8 MG/DL (ref 8.6–10.5)
CHLORIDE SERPL-SCNC: 99 MMOL/L (ref 98–107)
CHOLEST SERPL-MCNC: 130 MG/DL (ref 0–200)
CO2 SERPL-SCNC: 24.9 MMOL/L (ref 22–29)
CREAT SERPL-MCNC: 1.1 MG/DL (ref 0.76–1.27)
DEPRECATED RDW RBC AUTO: 47.7 FL (ref 37–54)
EGFRCR SERPLBLD CKD-EPI 2021: 71.8 ML/MIN/1.73
EOSINOPHIL # BLD AUTO: 0.41 10*3/MM3 (ref 0–0.4)
EOSINOPHIL NFR BLD AUTO: 5.4 % (ref 0.3–6.2)
ERYTHROCYTE [DISTWIDTH] IN BLOOD BY AUTOMATED COUNT: 14.3 % (ref 12.3–15.4)
GLOBULIN UR ELPH-MCNC: 2.8 GM/DL
GLUCOSE SERPL-MCNC: 134 MG/DL (ref 65–99)
HBA1C MFR BLD: 6.1 % (ref 3.5–5.6)
HCT VFR BLD AUTO: 49.7 % (ref 37.5–51)
HDLC SERPL-MCNC: 41 MG/DL (ref 40–60)
HGB BLD-MCNC: 16.9 G/DL (ref 13–17.7)
IMM GRANULOCYTES # BLD AUTO: 0.03 10*3/MM3 (ref 0–0.05)
IMM GRANULOCYTES NFR BLD AUTO: 0.4 % (ref 0–0.5)
LDLC SERPL CALC-MCNC: 65 MG/DL (ref 0–100)
LDLC/HDLC SERPL: 1.5 {RATIO}
LYMPHOCYTES # BLD AUTO: 1.05 10*3/MM3 (ref 0.7–3.1)
LYMPHOCYTES NFR BLD AUTO: 13.7 % (ref 19.6–45.3)
MCH RBC QN AUTO: 31 PG (ref 26.6–33)
MCHC RBC AUTO-ENTMCNC: 34 G/DL (ref 31.5–35.7)
MCV RBC AUTO: 91 FL (ref 79–97)
MONOCYTES # BLD AUTO: 0.74 10*3/MM3 (ref 0.1–0.9)
MONOCYTES NFR BLD AUTO: 9.7 % (ref 5–12)
NEUTROPHILS NFR BLD AUTO: 5.37 10*3/MM3 (ref 1.7–7)
NEUTROPHILS NFR BLD AUTO: 70.3 % (ref 42.7–76)
NRBC BLD AUTO-RTO: 0 /100 WBC (ref 0–0.2)
PLATELET # BLD AUTO: 236 10*3/MM3 (ref 140–450)
PMV BLD AUTO: 9.9 FL (ref 6–12)
POTASSIUM SERPL-SCNC: 4.8 MMOL/L (ref 3.5–5.2)
PROT SERPL-MCNC: 7.5 G/DL (ref 6–8.5)
RBC # BLD AUTO: 5.46 10*6/MM3 (ref 4.14–5.8)
SODIUM SERPL-SCNC: 137 MMOL/L (ref 136–145)
TRIGL SERPL-MCNC: 137 MG/DL (ref 0–150)
TSH SERPL DL<=0.05 MIU/L-ACNC: 0.88 UIU/ML (ref 0.27–4.2)
VLDLC SERPL-MCNC: 24 MG/DL (ref 5–40)
WBC NRBC COR # BLD: 7.64 10*3/MM3 (ref 3.4–10.8)

## 2022-09-12 PROCEDURE — 80053 COMPREHEN METABOLIC PANEL: CPT | Performed by: NURSE PRACTITIONER

## 2022-09-12 PROCEDURE — 80061 LIPID PANEL: CPT | Performed by: NURSE PRACTITIONER

## 2022-09-12 PROCEDURE — 84403 ASSAY OF TOTAL TESTOSTERONE: CPT | Performed by: NURSE PRACTITIONER

## 2022-09-12 PROCEDURE — 85025 COMPLETE CBC W/AUTO DIFF WBC: CPT | Performed by: NURSE PRACTITIONER

## 2022-09-12 PROCEDURE — 36415 COLL VENOUS BLD VENIPUNCTURE: CPT

## 2022-09-12 PROCEDURE — 96372 THER/PROPH/DIAG INJ SC/IM: CPT | Performed by: NURSE PRACTITIONER

## 2022-09-12 PROCEDURE — 84402 ASSAY OF FREE TESTOSTERONE: CPT | Performed by: NURSE PRACTITIONER

## 2022-09-12 PROCEDURE — 84443 ASSAY THYROID STIM HORMONE: CPT | Performed by: NURSE PRACTITIONER

## 2022-09-12 PROCEDURE — 83036 HEMOGLOBIN GLYCOSYLATED A1C: CPT | Performed by: NURSE PRACTITIONER

## 2022-09-12 RX ADMIN — TESTOSTERONE CYPIONATE 200 MG: 200 INJECTION, SOLUTION INTRAMUSCULAR at 09:21

## 2022-09-15 ENCOUNTER — OFFICE VISIT (OUTPATIENT)
Dept: FAMILY MEDICINE CLINIC | Facility: CLINIC | Age: 72
End: 2022-09-15

## 2022-09-15 VITALS
WEIGHT: 214 LBS | HEART RATE: 69 BPM | RESPIRATION RATE: 15 BRPM | OXYGEN SATURATION: 97 % | SYSTOLIC BLOOD PRESSURE: 147 MMHG | HEIGHT: 72 IN | BODY MASS INDEX: 28.99 KG/M2 | TEMPERATURE: 97.5 F | DIASTOLIC BLOOD PRESSURE: 81 MMHG

## 2022-09-15 DIAGNOSIS — E11.65 TYPE 2 DIABETES MELLITUS WITH HYPERGLYCEMIA, WITHOUT LONG-TERM CURRENT USE OF INSULIN: ICD-10-CM

## 2022-09-15 DIAGNOSIS — M05.79 RHEUMATOID ARTHRITIS INVOLVING MULTIPLE SITES WITH POSITIVE RHEUMATOID FACTOR: ICD-10-CM

## 2022-09-15 DIAGNOSIS — I25.10 CORONARY ARTERY DISEASE INVOLVING NATIVE CORONARY ARTERY OF NATIVE HEART WITHOUT ANGINA PECTORIS: Primary | ICD-10-CM

## 2022-09-15 DIAGNOSIS — I10 ESSENTIAL HYPERTENSION: ICD-10-CM

## 2022-09-15 PROBLEM — I35.0 AORTIC VALVE STENOSIS: Status: ACTIVE | Noted: 2022-08-05

## 2022-09-15 LAB
TESTOST FREE SERPL-MCNC: 3 PG/ML (ref 6.6–18.1)
TESTOST SERPL-MCNC: 215.7 NG/DL (ref 264–916)

## 2022-09-15 PROCEDURE — 99214 OFFICE O/P EST MOD 30 MIN: CPT | Performed by: NURSE PRACTITIONER

## 2022-09-15 RX ORDER — TADALAFIL 20 MG/1
20 TABLET ORAL DAILY PRN
Qty: 30 TABLET | Refills: 1 | Status: SHIPPED | OUTPATIENT
Start: 2022-09-15

## 2022-09-15 RX ORDER — ISOSORBIDE DINITRATE 10 MG/1
10 TABLET ORAL 2 TIMES DAILY
COMMUNITY
Start: 2022-08-31 | End: 2023-03-30

## 2022-09-15 NOTE — PROGRESS NOTES
"Chief Complaint  Diabetes and Hypertension  Subjective        Arik August presents to [facility]  History of Present Illness  Pt comes in today for routine 6 month follow up.  Since he was last seen he had a cardiac cath done on 8/9/22. Was having CP. Heart cath showed severe CAD, with 2/5 bypass conduits patent, and severe disease to LMCA (90-95%). Was started on imdur and starting to help.   Also has trouble with severe/chronic back pain. Has had severe spinal surgeries.   Seeing neurology for neuropathy, pain management, and also neurosurgery.   Pain MD was discussing placing a nerve stimulator vs paddle, but being deferred to neurosurgery.   Also see rheumatology for RA.  Getting ready to see oncology, because of \"protein found in CBC\". Not sure what he is referring to, but something the neurologist checked related to his neuropathy. No reports received yet.        Objective     Vital Signs:   /81   Pulse 69   Temp 97.5 °F (36.4 °C)   Resp 15   Ht 182.9 cm (72\")   Wt 97.1 kg (214 lb)   SpO2 97%   BMI 29.02 kg/m²       BP Readings from Last 3 Encounters:   09/15/22 147/81   03/14/22 126/70   01/17/22 128/72       Wt Readings from Last 3 Encounters:   09/15/22 97.1 kg (214 lb)   03/14/22 95.3 kg (210 lb)   01/17/22 92.8 kg (204 lb 9.6 oz)     Physical Exam  Constitutional:       Appearance: He is well-developed.   Eyes:      Pupils: Pupils are equal, round, and reactive to light.   Cardiovascular:      Rate and Rhythm: Normal rate and regular rhythm.   Pulmonary:      Effort: Pulmonary effort is normal.      Breath sounds: Normal breath sounds.   Neurological:      Mental Status: He is alert and oriented to person, place, and time.        Result Review :                 Assessment and Plan    Diagnoses and all orders for this visit:    1. Coronary artery disease involving native coronary artery of native heart without angina pectoris (Primary)  Assessment & " Plan:  Coronary artery disease is worsening.  Continue current treatment regimen.  managed by cardiology  Cardiac status will be reassessed in 6 months.      2. Type 2 diabetes mellitus with hyperglycemia, without long-term current use of insulin (HCC)  Assessment & Plan:  Diabetes is unchanged.   Dietary recommendations for ADA diet.  Regular aerobic exercise.  Diabetes will be reassessed in 6 months.  Working on diet.       3. Rheumatoid arthritis involving multiple sites with positive rheumatoid factor (HCC)  Assessment & Plan:  Sees rheum. On MXT      4. Essential hypertension  Assessment & Plan:  Hypertension is unchanged.  Continue current treatment regimen.  Blood pressure will be reassessed at the next regular appointment.      Other orders  -     tadalafil (Cialis) 20 MG tablet; Take 1 tablet by mouth Daily As Needed for Erectile Dysfunction.  Dispense: 30 tablet; Refill: 1    Follow up with specialists as scheduled  Obtain medical records from neurology  During this office visit, we discussed the pertinent aspects of the visit and treatment recommendations. Pt verbalizes understanding. Follow up was discussed. Patient was given the opportunity to ask questions and discuss other concerns.         Follow Up   Return if symptoms worsen or fail to improve.  Patient was given instructions and counseling regarding his condition or for health maintenance advice. Please see specific information pulled into the AVS if appropriate.

## 2022-09-15 NOTE — ASSESSMENT & PLAN NOTE
Diabetes is unchanged.   Dietary recommendations for ADA diet.  Regular aerobic exercise.  Diabetes will be reassessed in 6 months.  Working on diet.

## 2022-09-15 NOTE — ASSESSMENT & PLAN NOTE
Coronary artery disease is worsening.  Continue current treatment regimen.  managed by cardiology  Cardiac status will be reassessed in 6 months.

## 2022-09-19 DIAGNOSIS — E29.1 HYPOGONADISM MALE: Primary | ICD-10-CM

## 2022-09-19 RX ORDER — TESTOSTERONE CYPIONATE 200 MG/ML
300 INJECTION, SOLUTION INTRAMUSCULAR
Status: CANCELLED | OUTPATIENT
Start: 2022-09-19

## 2022-09-22 ENCOUNTER — TELEPHONE (OUTPATIENT)
Dept: FAMILY MEDICINE CLINIC | Facility: CLINIC | Age: 72
End: 2022-09-22

## 2022-09-22 NOTE — TELEPHONE ENCOUNTER
Spoke with patient and rescheduled his [facility] nurse appt to 9/26/2022 at 3pm.

## 2022-09-22 NOTE — TELEPHONE ENCOUNTER
UNABLE TO WARM TRANSFER     Caller: Arik August    Relationship to patient: Self    Best call back number: [phone]       Type of visit: T SHOT     Requested date: 9/27/2022     If rescheduling, when is the original appointment:9/26/2022    Additional notes:    NEEDING DATE CHANGED

## 2022-09-26 ENCOUNTER — CLINICAL SUPPORT (OUTPATIENT)
Dept: FAMILY MEDICINE CLINIC | Facility: CLINIC | Age: 72
End: 2022-09-26

## 2022-09-26 DIAGNOSIS — E34.9 TESTOSTERONE DEFICIENCY: ICD-10-CM

## 2022-09-26 PROCEDURE — 96372 THER/PROPH/DIAG INJ SC/IM: CPT | Performed by: NURSE PRACTITIONER

## 2022-09-26 RX ADMIN — TESTOSTERONE CYPIONATE 300 MG: 200 INJECTION, SOLUTION INTRAMUSCULAR at 09:33

## 2022-09-27 RX ORDER — TESTOSTERONE CYPIONATE 200 MG/ML
300 INJECTION, SOLUTION INTRAMUSCULAR
Status: SHIPPED | OUTPATIENT
Start: 2022-09-27

## 2022-10-04 RX ORDER — LEVOTHYROXINE SODIUM 0.1 MG/1
TABLET ORAL
Qty: 90 TABLET | Refills: 0 | Status: SHIPPED | OUTPATIENT
Start: 2022-10-04 | End: 2023-01-08

## 2022-10-07 ENCOUNTER — CLINICAL SUPPORT (OUTPATIENT)
Dept: FAMILY MEDICINE CLINIC | Facility: CLINIC | Age: 72
End: 2022-10-07

## 2022-10-07 DIAGNOSIS — E29.1 DEFICIENCY OF TESTOSTERONE BIOSYNTHESIS: ICD-10-CM

## 2022-10-07 PROCEDURE — 96372 THER/PROPH/DIAG INJ SC/IM: CPT | Performed by: NURSE PRACTITIONER

## 2022-10-07 RX ADMIN — TESTOSTERONE CYPIONATE 300 MG: 200 INJECTION, SOLUTION INTRAMUSCULAR at 10:53

## 2022-10-10 RX ORDER — POTASSIUM CHLORIDE 1500 MG/1
TABLET, EXTENDED RELEASE ORAL
Qty: 60 TABLET | Refills: 1 | Status: SHIPPED | OUTPATIENT
Start: 2022-10-10 | End: 2022-12-12

## 2022-10-24 ENCOUNTER — CLINICAL SUPPORT (OUTPATIENT)
Dept: FAMILY MEDICINE CLINIC | Facility: CLINIC | Age: 72
End: 2022-10-24

## 2022-10-24 DIAGNOSIS — E29.1 DEFICIENCY OF TESTOSTERONE BIOSYNTHESIS: ICD-10-CM

## 2022-10-24 PROCEDURE — 96372 THER/PROPH/DIAG INJ SC/IM: CPT | Performed by: NURSE PRACTITIONER

## 2022-10-24 RX ADMIN — TESTOSTERONE CYPIONATE 300 MG: 200 INJECTION, SOLUTION INTRAMUSCULAR at 09:07

## 2022-10-31 RX ORDER — LANOLIN ALCOHOL/MO/W.PET/CERES
CREAM (GRAM) TOPICAL
Qty: 90 TABLET | Refills: 1 | Status: SHIPPED | OUTPATIENT
Start: 2022-10-31 | End: 2023-03-30

## 2022-11-07 ENCOUNTER — CLINICAL SUPPORT (OUTPATIENT)
Dept: FAMILY MEDICINE CLINIC | Facility: CLINIC | Age: 72
End: 2022-11-07

## 2022-11-07 DIAGNOSIS — E29.1 DEFICIENCY OF TESTOSTERONE BIOSYNTHESIS: ICD-10-CM

## 2022-11-07 PROCEDURE — 96372 THER/PROPH/DIAG INJ SC/IM: CPT | Performed by: PHYSICIAN ASSISTANT

## 2022-11-07 RX ADMIN — TESTOSTERONE CYPIONATE 300 MG: 200 INJECTION, SOLUTION INTRAMUSCULAR at 09:19

## 2022-11-09 RX ORDER — TAMSULOSIN HYDROCHLORIDE 0.4 MG/1
CAPSULE ORAL
Qty: 90 CAPSULE | Refills: 0 | Status: SHIPPED | OUTPATIENT
Start: 2022-11-09 | End: 2023-02-09

## 2022-12-05 ENCOUNTER — CLINICAL SUPPORT (OUTPATIENT)
Dept: FAMILY MEDICINE CLINIC | Facility: CLINIC | Age: 72
End: 2022-12-05

## 2022-12-05 DIAGNOSIS — E29.1 DEFICIENCY OF TESTOSTERONE BIOSYNTHESIS: ICD-10-CM

## 2022-12-05 PROCEDURE — 96372 THER/PROPH/DIAG INJ SC/IM: CPT | Performed by: NURSE PRACTITIONER

## 2022-12-05 RX ADMIN — TESTOSTERONE CYPIONATE 300 MG: 200 INJECTION, SOLUTION INTRAMUSCULAR at 09:38

## 2022-12-12 RX ORDER — POTASSIUM CHLORIDE 1500 MG/1
TABLET, EXTENDED RELEASE ORAL
Qty: 60 TABLET | Refills: 1 | Status: SHIPPED | OUTPATIENT
Start: 2022-12-12 | End: 2023-02-14

## 2022-12-18 RX ORDER — ROSUVASTATIN CALCIUM 20 MG/1
TABLET, COATED ORAL
Qty: 90 TABLET | Refills: 1 | Status: SHIPPED | OUTPATIENT
Start: 2022-12-18

## 2022-12-19 ENCOUNTER — CLINICAL SUPPORT (OUTPATIENT)
Dept: FAMILY MEDICINE CLINIC | Facility: CLINIC | Age: 72
End: 2022-12-19

## 2022-12-19 DIAGNOSIS — E29.1 HYPOGONADISM MALE: ICD-10-CM

## 2022-12-19 PROCEDURE — 96372 THER/PROPH/DIAG INJ SC/IM: CPT | Performed by: NURSE PRACTITIONER

## 2022-12-19 RX ADMIN — TESTOSTERONE CYPIONATE 300 MG: 200 INJECTION, SOLUTION INTRAMUSCULAR at 09:09

## 2023-01-03 ENCOUNTER — CLINICAL SUPPORT (OUTPATIENT)
Dept: FAMILY MEDICINE CLINIC | Facility: CLINIC | Age: 73
End: 2023-01-03
Payer: MEDICARE

## 2023-01-03 DIAGNOSIS — E29.1 HYPOGONADISM MALE: ICD-10-CM

## 2023-01-03 PROCEDURE — 96372 THER/PROPH/DIAG INJ SC/IM: CPT | Performed by: NURSE PRACTITIONER

## 2023-01-03 RX ADMIN — TESTOSTERONE CYPIONATE 300 MG: 200 INJECTION, SOLUTION INTRAMUSCULAR at 10:32

## 2023-01-08 RX ORDER — LEVOTHYROXINE SODIUM 0.1 MG/1
TABLET ORAL
Qty: 90 TABLET | Refills: 0 | Status: SHIPPED | OUTPATIENT
Start: 2023-01-08

## 2023-01-16 ENCOUNTER — CLINICAL SUPPORT (OUTPATIENT)
Dept: FAMILY MEDICINE CLINIC | Facility: CLINIC | Age: 73
End: 2023-01-16
Payer: MEDICARE

## 2023-01-16 DIAGNOSIS — E29.1 DEFICIENCY OF TESTOSTERONE BIOSYNTHESIS: ICD-10-CM

## 2023-01-16 PROCEDURE — 96372 THER/PROPH/DIAG INJ SC/IM: CPT | Performed by: NURSE PRACTITIONER

## 2023-01-16 RX ADMIN — TESTOSTERONE CYPIONATE 300 MG: 200 INJECTION, SOLUTION INTRAMUSCULAR at 10:06

## 2023-01-30 ENCOUNTER — CLINICAL SUPPORT (OUTPATIENT)
Dept: FAMILY MEDICINE CLINIC | Facility: CLINIC | Age: 73
End: 2023-01-30
Payer: MEDICARE

## 2023-01-30 DIAGNOSIS — E29.1 DEFICIENCY OF TESTOSTERONE BIOSYNTHESIS: ICD-10-CM

## 2023-01-30 PROCEDURE — 96372 THER/PROPH/DIAG INJ SC/IM: CPT | Performed by: NURSE PRACTITIONER

## 2023-01-30 RX ADMIN — TESTOSTERONE CYPIONATE 300 MG: 200 INJECTION, SOLUTION INTRAMUSCULAR at 09:36

## 2023-02-09 RX ORDER — TAMSULOSIN HYDROCHLORIDE 0.4 MG/1
CAPSULE ORAL
Qty: 90 CAPSULE | Refills: 0 | Status: SHIPPED | OUTPATIENT
Start: 2023-02-09

## 2023-02-13 ENCOUNTER — CLINICAL SUPPORT (OUTPATIENT)
Dept: FAMILY MEDICINE CLINIC | Facility: CLINIC | Age: 73
End: 2023-02-13
Payer: MEDICARE

## 2023-02-13 DIAGNOSIS — E34.9 TESTOSTERONE DEFICIENCY: ICD-10-CM

## 2023-02-13 PROCEDURE — 96372 THER/PROPH/DIAG INJ SC/IM: CPT | Performed by: NURSE PRACTITIONER

## 2023-02-13 RX ADMIN — TESTOSTERONE CYPIONATE 300 MG: 200 INJECTION, SOLUTION INTRAMUSCULAR at 09:55

## 2023-02-14 RX ORDER — POTASSIUM CHLORIDE 1500 MG/1
TABLET, EXTENDED RELEASE ORAL
Qty: 60 TABLET | Refills: 1 | Status: SHIPPED | OUTPATIENT
Start: 2023-02-14 | End: 2023-03-30

## 2023-02-27 ENCOUNTER — CLINICAL SUPPORT (OUTPATIENT)
Dept: FAMILY MEDICINE CLINIC | Facility: CLINIC | Age: 73
End: 2023-02-27
Payer: MEDICARE

## 2023-02-27 DIAGNOSIS — E34.9 TESTOSTERONE DEFICIENCY: ICD-10-CM

## 2023-02-27 PROCEDURE — 96372 THER/PROPH/DIAG INJ SC/IM: CPT | Performed by: NURSE PRACTITIONER

## 2023-02-27 RX ADMIN — TESTOSTERONE CYPIONATE 300 MG: 200 INJECTION, SOLUTION INTRAMUSCULAR at 09:29

## 2023-03-13 ENCOUNTER — CLINICAL SUPPORT (OUTPATIENT)
Dept: FAMILY MEDICINE CLINIC | Facility: CLINIC | Age: 73
End: 2023-03-13
Payer: MEDICARE

## 2023-03-13 DIAGNOSIS — E29.1 DEFICIENCY OF TESTOSTERONE BIOSYNTHESIS: ICD-10-CM

## 2023-03-13 PROCEDURE — 96372 THER/PROPH/DIAG INJ SC/IM: CPT | Performed by: NURSE PRACTITIONER

## 2023-03-13 RX ADMIN — TESTOSTERONE CYPIONATE 300 MG: 200 INJECTION, SOLUTION INTRAMUSCULAR at 15:37

## 2023-03-27 ENCOUNTER — CLINICAL SUPPORT (OUTPATIENT)
Dept: FAMILY MEDICINE CLINIC | Facility: CLINIC | Age: 73
End: 2023-03-27
Payer: MEDICARE

## 2023-03-27 DIAGNOSIS — E34.9 TESTOSTERONE DEFICIENCY: ICD-10-CM

## 2023-03-27 RX ADMIN — TESTOSTERONE CYPIONATE 300 MG: 200 INJECTION, SOLUTION INTRAMUSCULAR at 10:01

## 2023-03-30 ENCOUNTER — OFFICE VISIT (OUTPATIENT)
Dept: FAMILY MEDICINE CLINIC | Facility: CLINIC | Age: 73
End: 2023-03-30
Payer: MEDICARE

## 2023-03-30 ENCOUNTER — LAB (OUTPATIENT)
Dept: FAMILY MEDICINE CLINIC | Facility: CLINIC | Age: 73
End: 2023-03-30
Payer: MEDICARE

## 2023-03-30 VITALS
TEMPERATURE: 97.5 F | HEART RATE: 71 BPM | WEIGHT: 220 LBS | OXYGEN SATURATION: 91 % | HEIGHT: 72 IN | BODY MASS INDEX: 29.8 KG/M2 | SYSTOLIC BLOOD PRESSURE: 138 MMHG | DIASTOLIC BLOOD PRESSURE: 68 MMHG | RESPIRATION RATE: 16 BRPM

## 2023-03-30 DIAGNOSIS — Z00.00 MEDICARE ANNUAL WELLNESS VISIT, SUBSEQUENT: Primary | ICD-10-CM

## 2023-03-30 DIAGNOSIS — E34.9 TESTOSTERONE DEFICIENCY: ICD-10-CM

## 2023-03-30 DIAGNOSIS — R26.81 UNSTEADY GAIT: ICD-10-CM

## 2023-03-30 DIAGNOSIS — Z00.00 MEDICARE ANNUAL WELLNESS VISIT, SUBSEQUENT: ICD-10-CM

## 2023-03-30 DIAGNOSIS — H81.10 BENIGN PAROXYSMAL POSITIONAL VERTIGO, UNSPECIFIED LATERALITY: ICD-10-CM

## 2023-03-30 DIAGNOSIS — Z12.5 SCREENING FOR PROSTATE CANCER: ICD-10-CM

## 2023-03-30 DIAGNOSIS — R29.898 WEAKNESS OF BOTH LOWER EXTREMITIES: ICD-10-CM

## 2023-03-30 LAB
ALBUMIN SERPL-MCNC: 4 G/DL (ref 3.5–5.2)
ALBUMIN UR-MCNC: 6 MG/DL
ALBUMIN/GLOB SERPL: 1.7 G/DL
ALP SERPL-CCNC: 55 U/L (ref 39–117)
ALT SERPL W P-5'-P-CCNC: 32 U/L (ref 1–41)
ANION GAP SERPL CALCULATED.3IONS-SCNC: 9 MMOL/L (ref 5–15)
AST SERPL-CCNC: 24 U/L (ref 1–40)
BASOPHILS # BLD AUTO: 0.04 10*3/MM3 (ref 0–0.2)
BASOPHILS NFR BLD AUTO: 0.3 % (ref 0–1.5)
BILIRUB SERPL-MCNC: 0.4 MG/DL (ref 0–1.2)
BUN SERPL-MCNC: 22 MG/DL (ref 8–23)
BUN/CREAT SERPL: 25 (ref 7–25)
CALCIUM SPEC-SCNC: 8.8 MG/DL (ref 8.6–10.5)
CHLORIDE SERPL-SCNC: 100 MMOL/L (ref 98–107)
CHOLEST SERPL-MCNC: 132 MG/DL (ref 0–200)
CO2 SERPL-SCNC: 29 MMOL/L (ref 22–29)
CREAT SERPL-MCNC: 0.88 MG/DL (ref 0.76–1.27)
CREAT UR-MCNC: 117.3 MG/DL
DEPRECATED RDW RBC AUTO: 54.6 FL (ref 37–54)
EGFRCR SERPLBLD CKD-EPI 2021: 91.9 ML/MIN/1.73
EOSINOPHIL # BLD AUTO: 0.22 10*3/MM3 (ref 0–0.4)
EOSINOPHIL NFR BLD AUTO: 1.9 % (ref 0.3–6.2)
ERYTHROCYTE [DISTWIDTH] IN BLOOD BY AUTOMATED COUNT: 16.3 % (ref 12.3–15.4)
GLOBULIN UR ELPH-MCNC: 2.4 GM/DL
GLUCOSE SERPL-MCNC: 108 MG/DL (ref 65–99)
HBA1C MFR BLD: 7.6 % (ref 4.8–5.6)
HCT VFR BLD AUTO: 50.1 % (ref 37.5–51)
HDLC SERPL-MCNC: 49 MG/DL (ref 40–60)
HGB BLD-MCNC: 16.4 G/DL (ref 13–17.7)
IMM GRANULOCYTES # BLD AUTO: 0.09 10*3/MM3 (ref 0–0.05)
IMM GRANULOCYTES NFR BLD AUTO: 0.8 % (ref 0–0.5)
LDLC SERPL CALC-MCNC: 64 MG/DL (ref 0–100)
LDLC/HDLC SERPL: 1.29 {RATIO}
LYMPHOCYTES # BLD AUTO: 1.69 10*3/MM3 (ref 0.7–3.1)
LYMPHOCYTES NFR BLD AUTO: 14.8 % (ref 19.6–45.3)
MCH RBC QN AUTO: 30.4 PG (ref 26.6–33)
MCHC RBC AUTO-ENTMCNC: 32.7 G/DL (ref 31.5–35.7)
MCV RBC AUTO: 92.9 FL (ref 79–97)
MICROALBUMIN/CREAT UR: 51.2 MG/G
MONOCYTES # BLD AUTO: 1.03 10*3/MM3 (ref 0.1–0.9)
MONOCYTES NFR BLD AUTO: 9 % (ref 5–12)
NEUTROPHILS NFR BLD AUTO: 73.2 % (ref 42.7–76)
NEUTROPHILS NFR BLD AUTO: 8.38 10*3/MM3 (ref 1.7–7)
NRBC BLD AUTO-RTO: 0 /100 WBC (ref 0–0.2)
PLATELET # BLD AUTO: 207 10*3/MM3 (ref 140–450)
PMV BLD AUTO: 10 FL (ref 6–12)
POTASSIUM SERPL-SCNC: 4.1 MMOL/L (ref 3.5–5.2)
PROT SERPL-MCNC: 6.4 G/DL (ref 6–8.5)
PSA SERPL-MCNC: 1.42 NG/ML (ref 0–4)
RBC # BLD AUTO: 5.39 10*6/MM3 (ref 4.14–5.8)
SODIUM SERPL-SCNC: 138 MMOL/L (ref 136–145)
TRIGL SERPL-MCNC: 100 MG/DL (ref 0–150)
TSH SERPL DL<=0.05 MIU/L-ACNC: 1.62 UIU/ML (ref 0.27–4.2)
VLDLC SERPL-MCNC: 19 MG/DL (ref 5–40)
WBC NRBC COR # BLD: 11.45 10*3/MM3 (ref 3.4–10.8)

## 2023-03-30 PROCEDURE — 36415 COLL VENOUS BLD VENIPUNCTURE: CPT

## 2023-03-30 PROCEDURE — 82570 ASSAY OF URINE CREATININE: CPT | Performed by: NURSE PRACTITIONER

## 2023-03-30 PROCEDURE — 85025 COMPLETE CBC W/AUTO DIFF WBC: CPT | Performed by: NURSE PRACTITIONER

## 2023-03-30 PROCEDURE — 84403 ASSAY OF TOTAL TESTOSTERONE: CPT | Performed by: NURSE PRACTITIONER

## 2023-03-30 PROCEDURE — 84443 ASSAY THYROID STIM HORMONE: CPT | Performed by: NURSE PRACTITIONER

## 2023-03-30 PROCEDURE — 80053 COMPREHEN METABOLIC PANEL: CPT | Performed by: NURSE PRACTITIONER

## 2023-03-30 PROCEDURE — 82043 UR ALBUMIN QUANTITATIVE: CPT | Performed by: NURSE PRACTITIONER

## 2023-03-30 PROCEDURE — 84402 ASSAY OF FREE TESTOSTERONE: CPT | Performed by: NURSE PRACTITIONER

## 2023-03-30 PROCEDURE — 80061 LIPID PANEL: CPT | Performed by: NURSE PRACTITIONER

## 2023-03-30 PROCEDURE — G0103 PSA SCREENING: HCPCS | Performed by: NURSE PRACTITIONER

## 2023-03-30 PROCEDURE — 83036 HEMOGLOBIN GLYCOSYLATED A1C: CPT | Performed by: NURSE PRACTITIONER

## 2023-03-30 RX ORDER — BUMETANIDE 2 MG/1
1 TABLET ORAL DAILY
Qty: 90 TABLET | Refills: 2
Start: 2023-03-30

## 2023-03-30 RX ORDER — LISINOPRIL 10 MG/1
5 TABLET ORAL DAILY
Qty: 90 TABLET | Refills: 3
Start: 2023-03-30

## 2023-03-30 RX ORDER — METOPROLOL SUCCINATE 25 MG/1
25 TABLET, EXTENDED RELEASE ORAL DAILY
Qty: 90 TABLET | Refills: 1
Start: 2023-03-30

## 2023-03-30 RX ORDER — POTASSIUM CHLORIDE 750 MG/1
10 TABLET, FILM COATED, EXTENDED RELEASE ORAL DAILY
Qty: 90 TABLET | Refills: 1 | Status: SHIPPED | OUTPATIENT
Start: 2023-03-30

## 2023-03-30 RX ORDER — ISOSORBIDE MONONITRATE 30 MG/1
30 TABLET, EXTENDED RELEASE ORAL DAILY
COMMUNITY

## 2023-03-30 RX ORDER — SARILUMAB 200 MG/1.14ML
200 INJECTION, SOLUTION SUBCUTANEOUS
COMMUNITY
Start: 2023-03-08

## 2023-03-30 NOTE — PROGRESS NOTES
The ABCs of the Annual Wellness Visit  Subsequent Medicare Wellness Visit    Subjective    Arik August is a 72 y.o. male who presents for a Subsequent Medicare Wellness Visit.    The following portions of the patient's history were reviewed and   updated as appropriate: allergies, current medications, past family history, past medical history, past social history, past surgical history and problem list.    Compared to one year ago, the patient feels his physical   health is worse.    Compared to one year ago, the patient feels his mental   health is the same.    Recent Hospitalizations:  He was not admitted to the hospital during the last year.       Current Medical Providers:  Patient Care Team:  Mary Busby APRN as PCP - General (Nurse Practitioner)  Marvin Mathis MD as Consulting Physician (Rheumatology)  Sven Zendejas MD as Consulting Physician (Orthopedic Surgery)  Bandar So NP (Nurse Practitioner)  Dionicio Salguero MD as Consulting Physician (Internal Medicine)  Ryan Wallace MD as Consulting Physician (Neurology)  Marlo Roper MD (Orthopedic Surgery)    Outpatient Medications Prior to Visit   Medication Sig Dispense Refill   • aspirin 81 MG chewable tablet Chew 1 tablet Daily.     • azelastine (OPTIVAR) 0.05 % ophthalmic solution      • cycloSPORINE (RESTASIS) 0.05 % ophthalmic emulsion RESTASIS 0.05 % EMUL     • diclofenac (VOLTAREN) 75 MG EC tablet TAKE ONE TABLET BY MOUTH TWICE A DAY AS NEEDED FOR PAIN 180 tablet 1   • folic acid (FOLVITE) 1 MG tablet 2 tablets daily     • isosorbide mononitrate (IMDUR) 30 MG 24 hr tablet Take 1 tablet by mouth Daily.     • levothyroxine (SYNTHROID, LEVOTHROID) 100 MCG tablet TAKE ONE TABLET BY MOUTH DAILY 90 tablet 0   • magnesium oxide (MAG-OX) 400 MG tablet TAKE ONE TABLET BY MOUTH DAILY 90 tablet 0   • Melatonin 10 MG capsule Take  by mouth Every Night.     • methotrexate 2.5 MG tablet Take  by mouth. 6 tablets  weekly     • Multiple Vitamins-Minerals (DAILY MULTIVITAMIN) capsule DAILY MULTIVITAMIN CAPS     • ofloxacin (OCUFLOX) 0.3 % ophthalmic solution Administer 1 drop into the left eye 4 (Four) Times a Day.     • oxyCODONE-acetaminophen (PERCOCET)  MG per tablet Every 8 (Eight) Hours.     • pantoprazole (PROTONIX) 40 MG EC tablet TAKE ONE TABLET BY MOUTH DAILY 90 tablet 2   • rosuvastatin (CRESTOR) 20 MG tablet TAKE ONE TABLET BY MOUTH DAILY 90 tablet 1   • Sarilumab (Kevzara) 200 MG/1.14ML solution auto-injector Inject 1.14 mL under the skin into the appropriate area as directed.     • tadalafil (Cialis) 20 MG tablet Take 1 tablet by mouth Daily As Needed for Erectile Dysfunction. 30 tablet 1   • tamsulosin (FLOMAX) 0.4 MG capsule 24 hr capsule TAKE ONE CAPSULE BY MOUTH DAILY 90 capsule 0   • triamcinolone (KENALOG) 0.1 % cream      • vitamin B-12 (CYANOCOBALAMIN) 100 MCG tablet Take 50 mcg by mouth Daily.     • bumetanide (BUMEX) 2 MG tablet TAKE ONE TABLET BY MOUTH TWICE A DAY AS NEEDED 180 tablet 2   • isosorbide dinitrate (ISORDIL) 10 MG tablet Take 1 tablet by mouth 2 (Two) Times a Day.     • KLOR-CON 20 MEQ CR tablet TAKE ONE TABLET BY MOUTH TWICE A DAY 60 tablet 1   • lisinopril (PRINIVIL,ZESTRIL) 10 MG tablet Take 1 tablet by mouth Daily. 90 tablet 3   • Magnesium Oxide 400 (240 Mg) MG tablet TAKE ONE TABLET BY MOUTH DAILY 90 tablet 1   • metoprolol succinate XL (TOPROL-XL) 25 MG 24 hr tablet Take 1 tablet by mouth 2 (Two) Times a Day. 60 tablet 5     Facility-Administered Medications Prior to Visit   Medication Dose Route Frequency Provider Last Rate Last Admin   • Testosterone Cypionate (DEPOTESTOTERONE CYPIONATE) injection 300 mg  300 mg Intramuscular Q14 Days Mary Busby APRN   300 mg at 03/27/23 1001       Opioid medication/s are on active medication list.  and I have evaluated his active treatment plan and pain score trends (see table).  There were no vitals filed for this visit.  I have  "reviewed the chart for potential of high risk medication and harmful drug interactions in the elderly.            Aspirin is on active medication list. Aspirin use is indicated based on review of current medical condition/s. Pros and cons of this therapy have been discussed today. Benefits of this medication outweigh potential harm.  Patient has been encouraged to continue taking this medication.  .      Patient Active Problem List   Diagnosis   • S/P CABG x 5   • Ischemic cardiomyopathy   • Deficiency of testosterone biosynthesis   • Coronary artery disease involving native coronary artery of native heart without angina pectoris   • Mixed hyperlipidemia   • Essential hypertension   • Type 2 diabetes mellitus with hyperglycemia, without long-term current use of insulin (HCC)   • Failed orthopedic implant (HCC)   • Lumbar post-laminectomy syndrome   • Myofascial pain   • Pseudarthrosis following spinal fusion   • Thoracic back pain   • Weakness of both legs   • Thoracic spine fracture (HCC)   • Pseudoarthrosis of lumbar spine   • Hypothyroidism   • Cubital tunnel syndrome   • Acute blood loss as cause of postoperative anemia   • Mechanical complication of orthopedic internal fixation device (HCC)   • Rheumatoid arthritis involving multiple sites with positive rheumatoid factor (HCC)   • Testosterone deficiency   • Aortic valve stenosis     Advance Care Planning  Advance Directive is not on file.  ACP discussion was held with the patient during this visit. Patient does not have an advance directive, information provided.     Objective    Vitals:    03/30/23 0822   BP: 138/68   Pulse: 71   Resp: 16   Temp: 97.5 °F (36.4 °C)   SpO2: 91%   Weight: 99.8 kg (220 lb)   Height: 182.9 cm (72\")     Estimated body mass index is 29.84 kg/m² as calculated from the following:    Height as of this encounter: 182.9 cm (72\").    Weight as of this encounter: 99.8 kg (220 lb).    BMI is >= 25 and <30. (Overweight) The following options " were offered after discussion;: exercise counseling/recommendations      Does the patient have evidence of cognitive impairment? No          HEALTH RISK ASSESSMENT    Smoking Status:  Social History     Tobacco Use   Smoking Status Former   • Packs/day: 1.50   • Years: 15.00   • Pack years: 22.50   • Types: Cigarettes   • Quit date: 1992   • Years since quittin.9   Smokeless Tobacco Never     Alcohol Consumption:  Social History     Substance and Sexual Activity   Alcohol Use Not Currently    Comment: Occ Beer     Fall Risk Screen:    STEADI Fall Risk Assessment was completed, and patient is at MODERATE risk for falls. Assessment completed on:3/30/2023    Depression Screening:  PHQ-2/PHQ-9 Depression Screening 3/30/2023   Little Interest or Pleasure in Doing Things 0-->not at all   Feeling Down, Depressed or Hopeless 0-->not at all   Trouble Falling or Staying Asleep, or Sleeping Too Much 2-->more than half the days   Feeling Tired or Having Little Energy 2-->more than half the days   Poor Appetite or Overeating 0-->not at all   Feeling Bad about Yourself - or that You are a Failure or Have Let Yourself or Your Family Down 1-->several days   Trouble Concentrating on Things, Such as Reading the Newspaper or Watching Television 0-->not at all   Moving or Speaking So Slowly that Other People Could Have Noticed? Or the Opposite - Being So Fidgety 0-->not at all   Thoughts that You Would be Better Off Dead or of Hurting Yourself in Some Way 0-->not at all   PHQ-9: Brief Depression Severity Measure Score 5   If You Checked Off Any Problems, How Difficult Have These Problems Made It For You to Do Your Work, Take Care of Things at Home, or Get Along with Other People? somewhat difficult       Health Habits and Functional and Cognitive Screening:  Functional & Cognitive Status 3/30/2023   Do you have difficulty preparing food and eating? No   Do you have difficulty bathing yourself, getting dressed or grooming  yourself? No   Do you have difficulty using the toilet? No   Do you have difficulty moving around from place to place? Yes   Do you have trouble with steps or getting out of a bed or a chair? Yes   Current Diet Other   Dental Exam Up to date   Eye Exam Up to date   Exercise (times per week) 2 times per week   Current Exercises Include Treadmill;Stationary Bicycling/Spin Class   Do you need help using the phone?  No   Are you deaf or do you have serious difficulty hearing?  No   Do you need help with transportation? No   Do you need help shopping? No   Do you need help preparing meals?  -   Do you need help with housework?  No   Do you need help with laundry? No   Do you need help taking your medications? No   Do you need help managing money? No   Do you ever drive or ride in a car without wearing a seat belt? No   Have you felt unusual stress, anger or loneliness in the last month? No   Who do you live with? Spouse   If you need help, do you have trouble finding someone available to you? No   Have you been bothered in the last four weeks by sexual problems? No   Do you have difficulty concentrating, remembering or making decisions? No       Age-appropriate Screening Schedule:  Refer to the list below for future screening recommendations based on patient's age, sex and/or medical conditions. Orders for these recommended tests are listed in the plan section. The patient has been provided with a written plan.    Health Maintenance   Topic Date Due   • URINE MICROALBUMIN  03/15/2022   • HEMOGLOBIN A1C  03/12/2023   • AAA SCREEN (ONE-TIME)  03/30/2023 (Originally 8/2/2019)   • ZOSTER VACCINE (1 of 2) 03/30/2023 (Originally 7/15/2001)   • DIABETIC FOOT EXAM  04/14/2023 (Originally 12/26/2018)   • COVID-19 Vaccine (5 - Booster for Moderna series) 05/09/2023 (Originally 1/31/2022)   • DIABETIC EYE EXAM  09/01/2023   • LIPID PANEL  09/12/2023   • ANNUAL WELLNESS VISIT  03/30/2024   • COLORECTAL CANCER SCREENING  07/30/2029  "  • TDAP/TD VACCINES (2 - Td or Tdap) 03/05/2031   • HEPATITIS C SCREENING  Completed   • INFLUENZA VACCINE  Completed   • Pneumococcal Vaccine 65+  Completed                CMS Preventative Services Quick Reference  Risk Factors Identified During Encounter  Chronic Pain: Physical Therapy Referral Ordered  sees pain management and surgery for chronic issues  The above risks/problems have been discussed with the patient.  Pertinent information has been shared with the patient in the After Visit Summary.  An After Visit Summary and PPPS were made available to the patient.    Follow Up:   Next Medicare Wellness visit to be scheduled in 1 year.       Additional E&M Note during same encounter follows:  Patient has multiple medical problems which are significant and separately identifiable that require additional work above and beyond the Medicare Wellness Visit.      Chief Complaint  Medicare Wellness-subsequent    Subjective        HPI  Arik August is also being seen today for chronic pain and weakness in legs. Seeing several specialist including neurosurgery and pain management. Had appt yesterday and getting ready to have another MRI. Has had 6 total back surgeries. Has trouble with legs mostly. Feels weak. Has to use cane.  He is also dealing with dizziness. Worse with position changes. Concerned about medications. He is wanting to decreae bumex to 1/2 tab (1mg) daily to see if that helps.   Will also discuss this with cardiology.          Objective   Vital Signs:  /68   Pulse 71   Temp 97.5 °F (36.4 °C)   Resp 16   Ht 182.9 cm (72\")   Wt 99.8 kg (220 lb)   SpO2 91%   BMI 29.84 kg/m²     Physical Exam  Constitutional:       Appearance: He is well-developed.   HENT:      Head: Normocephalic.   Eyes:      Conjunctiva/sclera: Conjunctivae normal.      Pupils: Pupils are equal, round, and reactive to light.   Neck:      Thyroid: No thyromegaly.   Cardiovascular:      Rate and Rhythm: Normal rate and " regular rhythm.      Heart sounds: No murmur heard.  Pulmonary:      Effort: Pulmonary effort is normal.      Breath sounds: Normal breath sounds.   Abdominal:      General: Bowel sounds are normal.      Palpations: Abdomen is soft.      Tenderness: There is no abdominal tenderness.   Musculoskeletal:         General: Normal range of motion.      Cervical back: Normal range of motion and neck supple.   Skin:     General: Skin is warm and dry.      Findings: No lesion.   Neurological:      Mental Status: He is alert and oriented to person, place, and time.   Psychiatric:         Behavior: Behavior normal.                         Assessment and Plan   Diagnoses and all orders for this visit:    1. Medicare annual wellness visit, subsequent (Primary)  -     Microalbumin / Creatinine Urine Ratio - Urine, Clean Catch  -     CBC & Differential; Future  -     Comprehensive Metabolic Panel; Future  -     Hemoglobin A1c; Future  -     Lipid Panel; Future  -     PSA Screen; Future  -     TSH; Future  -     Testosterone (Free & Total), LC / MS; Future    2. Screening for prostate cancer  -     PSA Screen; Future    3. Testosterone deficiency  -     Testosterone (Free & Total), LC / MS; Future    4. Benign paroxysmal positional vertigo, unspecified laterality  -     Ambulatory Referral to Physical Therapy Evaluate and treat, Vestibular    5. Weakness of both lower extremities  -     Ambulatory Referral to Physical Therapy Evaluate and treat, Vestibular    6. Unsteady gait  -     Ambulatory Referral to Physical Therapy Evaluate and treat, Vestibular    Other orders  -     bumetanide (BUMEX) 2 MG tablet; Take 0.5 tablets by mouth Daily.  Dispense: 90 tablet; Refill: 2  -     potassium chloride (K-DUR,KLOR-CON) 10 MEQ ER tablet; Take 1 tablet by mouth Daily.  Dispense: 90 tablet; Refill: 1  -     lisinopril (PRINIVIL,ZESTRIL) 10 MG tablet; Take 0.5 tablets by mouth Daily.  Dispense: 90 tablet; Refill: 3  -     metoprolol succinate  XL (TOPROL-XL) 25 MG 24 hr tablet; Take 1 tablet by mouth Daily.  Dispense: 90 tablet; Refill: 1    check labs  Decrease bumex to 1/2 tab  Decrease KCL to 10meq daily  Will repeat labs in 2 months  Follow up with cardiology  Referral to PT   During this visit for their annual exam, we reviewed their personal history, social history and family history. We went over their medications and all the recommended health maintenance items for their age group. They were given the opportunity to ask questions and discuss other concerns.            Follow Up   Return in about 6 months (around 9/30/2023).  Patient was given instructions and counseling regarding his condition or for health maintenance advice. Please see specific information pulled into the AVS if appropriate.

## 2023-04-05 ENCOUNTER — TREATMENT (OUTPATIENT)
Dept: PHYSICAL THERAPY | Facility: CLINIC | Age: 73
End: 2023-04-05
Payer: MEDICARE

## 2023-04-05 DIAGNOSIS — H83.2X3 VESTIBULAR HYPOFUNCTION OF BOTH EARS: ICD-10-CM

## 2023-04-05 DIAGNOSIS — R26.2 DIFFICULTY WALKING: ICD-10-CM

## 2023-04-05 DIAGNOSIS — R26.81 UNSTEADINESS ON FEET: ICD-10-CM

## 2023-04-05 DIAGNOSIS — R42 VERTIGO: Primary | ICD-10-CM

## 2023-04-05 DIAGNOSIS — R29.898 WEAKNESS OF BOTH LOWER EXTREMITIES: ICD-10-CM

## 2023-04-05 DIAGNOSIS — Z74.09 IMPAIRED FUNCTIONAL MOBILITY, BALANCE, GAIT, AND ENDURANCE: ICD-10-CM

## 2023-04-05 LAB
TESTOST FREE SERPL-MCNC: 46 PG/ML (ref 6.6–18.1)
TESTOST SERPL-MCNC: 2295.2 NG/DL (ref 264–916)

## 2023-04-05 PROCEDURE — 97163 PT EVAL HIGH COMPLEX 45 MIN: CPT | Performed by: PHYSICAL THERAPIST

## 2023-04-05 PROCEDURE — 95992 CANALITH REPOSITIONING PROC: CPT | Performed by: PHYSICAL THERAPIST

## 2023-04-05 NOTE — PROGRESS NOTES
Physical Therapy Initial Evaluation and Plan of Care     [address] [place] [address] [place] [address]    Patient: Arik August   : 1950  Diagnosis/ICD-10 Code:  Vertigo [R42], weakness of legs [R29.898]  Referring practitioner: ANTONIA Tapia  Date of Initial Visit: 2023  Today's Date: 2023  Patient seen for 1 sessions           Subjective Questionnaire: PT Functional Test: DHI = 72% impairment      Subjective Evaluation    History of Present Illness  Mechanism of injury: Pt reports having unsteadiness x1-2 yrs. States he feels like is on a canoe trying to stand up and this feeling is constant. If he gets up fast, that affects him too. Pt reports he has 6 back surgeries ([id number]) and states he is fused from top to bottom. Surgery in , pt states he was cut from base of skull all the way down to low back. States it took a long time to recover but got to where he did not need the cane. Then pain started to come back. 2023 things got much worse with light-headedness and pain. Pt states he has had 1 fall in the past year. Does not know what happened, he just went down. Using cane all the time in home and outside of home. Uses rollator too. States he uses walker everyday, depends on where he is going, uses walker to go from house to garage/shop. 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 Cannot carrying things, gets unbalanced. States his legs are very weak and sometimes cannot walk 30 ft before he has to sit down. Cannot walk on grass or real soft carpet. States his Jew has very soft carpet and cannot walk up to communion any more because of the carpet. Current pain is at base of skull and mid back (along spine). States low back is pretty good. Has pain down R LE if he looks up. Looking up causes dizziness also. Bending over causes dizziness. Dizziness with supine>sit. No dizziness with sit>supine. Difficulty walking in home at night. Hands are numb all the time and  fairly constant numbness in legs from knees down to feet. Hx B TKR. Pt under care of pain management and takes pain med 4x/day. Has tried ice and heat with no benefit. Has not taken pain med yet this morning. Pt states he has had weak ankles for several years. Catches toes at times when walking. States he gets muscle cramps a lot in his thigh and groin, L>R.    Pt reports he had myelogram recently. Was on 3 wk course of steroids and states his pain was much better and then returned ~2 days after finishing steroids. Will start getting injection every 2 wk for inflammation.    Myelogram 2023 notes:  -Posterior fusion extends from the C2 level to the pelvis.  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 No osseous fusion of T5-T9 or lower cervical/upper thoracic spine. Retained pedicle screw fragments at T1.   -No significant cervical, thoracic or lumbar spinal stenosis. Mild cord flattening is noted at C5-C6 due to osteophyte formation.   -Advanced degenerative changes at C1-C2 with pannus formation and mild spinal stenosis/cord flattening.          Patient Occupation: retired Pain  Current pain ratin  At best pain ratin  At worst pain ratin  Location: neck, mid back  Quality: dull ache, discomfort, pressure and sharp  Relieving factors: medications  Aggravating factors: lifting, movement, standing, sleeping, prolonged positioning, ambulation and repetitive movement  Progression: worsening    Social Support  Lives in: multiple-level home (does not use basement often)  Lives with: spouse    Hand dominance: right    Patient Goals  Patient goals for therapy: decreased pain, improved balance, increased motion, increased strength, independence with ADLs/IADLs and return to sport/leisure activities  Patient goal: wants to be able to garden           Objective          Postural Observations    Additional Postural Observation  Details  Surgical scar extending from base of skull to sacrum.     Decreased cervical lordosis, decreased thoracic kyphosis, decreased lumbar lordosis.    Palpation   Left   Hypertonic in the cervical paraspinals, levator scapulae, suboccipitals and upper trapezius.   Tenderness of the levator scapulae and suboccipitals.     Right   Hypertonic in the cervical paraspinals, levator scapulae, suboccipitals and upper trapezius. Tenderness of the levator scapulae and suboccipitals.     Active Range of Motion     Additional Active Range of Motion Details  Significantly limited ROM in all directions due to extensive fusion.    Strength/Myotome Testing     Left Hip   Planes of Motion   Flexion: 3-  Abduction: 4-    Right Hip   Planes of Motion   Flexion: 4  Abduction: 4-    Left Knee   Flexion: 4  Extension: 4+    Right Knee   Flexion: 4  Extension: 4+    Left Ankle/Foot   Dorsiflexion: 3-    Right Ankle/Foot   Dorsiflexion: 3-      Transfers:   Sit to stand with use of B UE and extra time to complete transition.   Supine<>sit with MIN A. Limited space on mat and pt kept getting cramps in L proximal quad.    Gait: SPC, decreased ildefonso, B foot drop, foot flat contact B, WBOS, decreased step length, decreased foot clearance B but no shuffling.    DHI indicates 72% impairment with limitations in all areas.  Cervical AROM is WNL without symptom aggravation.  Cervical instability negative.    Negative central vestibular signs (smooth pursuit, saccades, resting nystagmus).  Positive peripheral vestibular signs (VOR). VOR performed with pt seated in swivel stool. Not able to perform head thrust due to neck fusion.     30 sec STS = 7 reps with use of B UE on 3 attempts  TUG = 20 sec with SPC  TUG with cognitive task = 20 sec with SPC  MCTSIB = 2/4 (30,2,30,0)  Modified DGI= 4/12 (1,1,1,1)  SLS = not assessed    BPPV testing: Attempted [place] Hallpike B with modifications due to cervical-lumbar fusion. L DHP negative. R DHP mild  positive with reports blurriness.   Attempted R Epley but difficulty due to spinal fusion. Will readdress at next visit.      Assessment & Plan     Assessment  Impairments: abnormal coordination, abnormal gait, abnormal muscle tone, abnormal or restricted ROM, activity intolerance, impaired balance, impaired physical strength, lacks appropriate home exercise program, pain with function and safety issue  Functional Limitations: moving in bed and stooping  Assessment details: Pt is a 72 y.o. male with c/o unsteadiness, dizziness, B LE weakness, difficulty walking, and increased neck pain and mid back pain. Pt with positive R DHP. Pt with impaired sensory integration as evidenced by MCTSIB of 2/4. Pt is having difficulty with head movements. Difficulty with all ambulation. Not able to walk on grass of very soft carpet. Difficulty walking in [place] due to soft carpet. Difficulty with bed mobility. Difficulty with muscle cramps in B thighs. Pt will benefit from some type of AFO to decrease fall risk related to B foot drop. DHI indicates 72% impairment.     Patient presents with the impairments listed above and based on the objective findings and the physical therapy evaluation, the patient’s condition has the potential to improve in response to therapy.   The patient’s condition and/or services required are at a level of complexity that necessitates the skill & supervision of a physical therapist.    Prognosis: good    Goals  Plan Goals: STG to be met in 3 wk:  - Pt to report 25% improvement in frequency of dizziness.  - Pt to tolerate NuStep x10 min for improved functional activity tolerance.  - Pt to be independent with HEP.  LTG to be met in 3 wk:  - Improve DHI to 25% or less impairment.  - Improve sensory integration as evidenced by MCTSIB of 3/4 in order to walk to the bathroom at night.  - Increase L hip flex to 4-/5 or greater for improved foot clearance when walking.  - Pt to stand on foam surface x1 min with  WBOS, no UE support and no LOB in order to walk to communion at [facility].    Plan  Therapy options: will be seen for skilled therapy services  Planned modality interventions: thermotherapy (hydrocollator packs), electrical stimulation/Russian stimulation and dry needling  Planned therapy interventions: balance/weight-bearing training, body mechanics training, gait training, home exercise program, manual therapy, neuromuscular re-education, postural training, strengthening, therapeutic activities and transfer training  Other planned therapy interventions: canalith repositioning  Frequency: 2x week  Duration in weeks: 12  Treatment plan discussed with: patient        Timed:         Manual Therapy:         mins  95150;     Therapeutic Exercise:    5     mins  31005;     Neuromuscular Jessica:        mins  41504;    Therapeutic Activity:          mins  99408;     Gait Training:           mins  55207;     Ultrasound:          mins  20906;    Ionto                                   mins   26078  Self - Care                          mins  32887    Un-Timed:  Electrical Stimulation:        mins  53947 ( );  Dry Needling          20561/20560  Traction          mins 37253  Can Repos     15     mins 27353  Low Eval          Mins  30829  Mod Eval          Mins  05100  High Eval                       40     Mins  91355      Timed Treatment:   5   mins   Total Treatment:     60   mins      PT SIGNATURE: Sulema Richter PT, CLT  [place] Lic # [id number]  Electronically signed by Sulema Richter PT, 04/05/23, 7:36 AM EDT    Medicare Initial Certification  Certification Period: 4/5/2023 thru 7/3/2023  I certify that the therapy services are furnished while this patient is under my care.  The services outlined above are required by this patient, and will be reviewed every 90 days.     PHYSICIAN: Mary Busby, ANTONIA _____________________________________________________  NPI: [id number]                                       DATE:    Please sign and return via fax to [phone]. Thank you, [facility].

## 2023-04-05 NOTE — PROGRESS NOTES
Testosterone level is extremely high. What is he currently getting?   A1C is also elevated at 7.6. now considered diabetic. I would like for him to start metformin ER once daily and also work on diet. Avoid sweets and carbs. Follow up with me in 3 months

## 2023-04-10 ENCOUNTER — CLINICAL SUPPORT (OUTPATIENT)
Dept: FAMILY MEDICINE CLINIC | Facility: CLINIC | Age: 73
End: 2023-04-10
Payer: MEDICARE

## 2023-04-10 DIAGNOSIS — E29.1 DEFICIENCY OF TESTOSTERONE BIOSYNTHESIS: ICD-10-CM

## 2023-04-10 DIAGNOSIS — E34.9 TESTOSTERONE DEFICIENCY: Primary | ICD-10-CM

## 2023-04-10 PROCEDURE — 96372 THER/PROPH/DIAG INJ SC/IM: CPT | Performed by: NURSE PRACTITIONER

## 2023-04-10 RX ORDER — LEVOTHYROXINE SODIUM 0.1 MG/1
TABLET ORAL
Qty: 90 TABLET | Refills: 0 | Status: SHIPPED | OUTPATIENT
Start: 2023-04-10

## 2023-04-10 RX ORDER — TESTOSTERONE CYPIONATE 200 MG/ML
200 INJECTION, SOLUTION INTRAMUSCULAR
Status: SHIPPED | OUTPATIENT
Start: 2023-04-10

## 2023-04-10 RX ADMIN — TESTOSTERONE CYPIONATE 200 MG: 200 INJECTION, SOLUTION INTRAMUSCULAR at 10:08

## 2023-04-12 ENCOUNTER — TREATMENT (OUTPATIENT)
Dept: PHYSICAL THERAPY | Facility: CLINIC | Age: 73
End: 2023-04-12
Payer: MEDICARE

## 2023-04-12 DIAGNOSIS — Z74.09 IMPAIRED FUNCTIONAL MOBILITY, BALANCE, GAIT, AND ENDURANCE: ICD-10-CM

## 2023-04-12 DIAGNOSIS — R26.81 UNSTEADINESS ON FEET: ICD-10-CM

## 2023-04-12 DIAGNOSIS — R26.2 DIFFICULTY WALKING: ICD-10-CM

## 2023-04-12 DIAGNOSIS — H83.2X3 VESTIBULAR HYPOFUNCTION OF BOTH EARS: ICD-10-CM

## 2023-04-12 DIAGNOSIS — R29.898 WEAKNESS OF BOTH LOWER EXTREMITIES: ICD-10-CM

## 2023-04-12 DIAGNOSIS — R42 VERTIGO: Primary | ICD-10-CM

## 2023-04-12 PROCEDURE — 97110 THERAPEUTIC EXERCISES: CPT | Performed by: PHYSICAL THERAPIST

## 2023-04-12 PROCEDURE — 97140 MANUAL THERAPY 1/> REGIONS: CPT | Performed by: PHYSICAL THERAPIST

## 2023-04-12 PROCEDURE — 97112 NEUROMUSCULAR REEDUCATION: CPT | Performed by: PHYSICAL THERAPIST

## 2023-04-12 NOTE — PROGRESS NOTES
Physical Therapy Daily Progress Note      Patient: Arik August   : 1950  Diagnosis/ICD-10 Code:  Vertigo [R42]  Referring practitioner: ANTONIA Tapia  Date of Initial Visit: Type: THERAPY  Noted: 2023  Today's Date: 2023  Patient seen for 2 sessions             Subjective Pt had c/o dizziness at home with turning his head/body.  He also has c/o left hip flexor pain that makes it difficult to perform seated marching and sit to stands    Objective   See Exercise, Manual, and Modality Logs for complete treatment.       Assessment/Plan Pt responded well to manual hip flexor stretch.  Pt had occasional c/o dizziness with vestibular exercises but symptoms were mild.  LE strength are still noticeable.    Progress per Plan of Care           Timed:         Manual Therapy:    8     mins  23895;     Therapeutic Exercise:    30     mins  20297;     Neuromuscular Jessica:    15    mins  38199;          Timed Treatment:   53   mins   Total Treatment:     53   mins        Angel Nolen PTA  Physical Therapist Assistant

## 2023-04-14 ENCOUNTER — TREATMENT (OUTPATIENT)
Dept: PHYSICAL THERAPY | Facility: CLINIC | Age: 73
End: 2023-04-14
Payer: MEDICARE

## 2023-04-14 DIAGNOSIS — R42 VERTIGO: Primary | ICD-10-CM

## 2023-04-14 DIAGNOSIS — Z74.09 IMPAIRED FUNCTIONAL MOBILITY, BALANCE, GAIT, AND ENDURANCE: ICD-10-CM

## 2023-04-14 DIAGNOSIS — H83.2X3 VESTIBULAR HYPOFUNCTION OF BOTH EARS: ICD-10-CM

## 2023-04-14 DIAGNOSIS — R26.81 UNSTEADINESS ON FEET: ICD-10-CM

## 2023-04-14 DIAGNOSIS — R29.898 WEAKNESS OF BOTH LOWER EXTREMITIES: ICD-10-CM

## 2023-04-14 DIAGNOSIS — R26.2 DIFFICULTY WALKING: ICD-10-CM

## 2023-04-14 PROCEDURE — 97112 NEUROMUSCULAR REEDUCATION: CPT | Performed by: PHYSICAL THERAPIST

## 2023-04-14 PROCEDURE — 97110 THERAPEUTIC EXERCISES: CPT | Performed by: PHYSICAL THERAPIST

## 2023-04-14 PROCEDURE — 97140 MANUAL THERAPY 1/> REGIONS: CPT | Performed by: PHYSICAL THERAPIST

## 2023-04-14 NOTE — PROGRESS NOTES
Physical Therapy Daily Progress Note      Patient: Arik August   : 1950  Diagnosis/ICD-10 Code:  Vertigo [R42]  Referring practitioner: ANTONIA Tapia  Date of Initial Visit: Type: THERAPY  Noted: 2023  Today's Date: 2023  Patient seen for 3 sessions             Subjective Pt's left hip flexor felt better after last visit.  However, he thinks that he did too much yesterday and he had increased leg cramps in both hamstrings and quads.    Objective   See Exercise, Manual, and Modality Logs for complete treatment.       Assessment/Plan  Pt continues to present with significant deficits with functional strength, mobility and balance    Progress per Plan of Care           Timed:         Manual Therapy:    8     mins  48081;     Therapeutic Exercise:    30     mins  02289;     Neuromuscular Jessica:    15    mins  32363;        Timed Treatment:   53   mins   Total Treatment:     53   mins        Angel Nolen PTA  Physical Therapist Assistant

## 2023-04-21 ENCOUNTER — TREATMENT (OUTPATIENT)
Dept: PHYSICAL THERAPY | Facility: CLINIC | Age: 73
End: 2023-04-21
Payer: MEDICARE

## 2023-04-21 DIAGNOSIS — H83.2X3 VESTIBULAR HYPOFUNCTION OF BOTH EARS: ICD-10-CM

## 2023-04-21 DIAGNOSIS — R42 VERTIGO: Primary | ICD-10-CM

## 2023-04-21 DIAGNOSIS — R26.2 DIFFICULTY WALKING: ICD-10-CM

## 2023-04-21 DIAGNOSIS — R29.898 WEAKNESS OF BOTH LOWER EXTREMITIES: ICD-10-CM

## 2023-04-21 DIAGNOSIS — R26.81 UNSTEADINESS ON FEET: ICD-10-CM

## 2023-04-21 DIAGNOSIS — Z74.09 IMPAIRED FUNCTIONAL MOBILITY, BALANCE, GAIT, AND ENDURANCE: ICD-10-CM

## 2023-04-21 NOTE — PROGRESS NOTES
Physical Therapy Daily Progress Note      Patient: Arik August   : 1950  Diagnosis/ICD-10 Code:  Vertigo [R42]  Referring practitioner: ANTONIA Tapia  Date of Initial Visit: Type: THERAPY  Noted: 2023  Today's Date: 2023  Patient seen for 4 sessions             Subjective Pt was using his walker the past few days but came into the clinic with his straight cane.  He is doing his HEP.    Objective   See Exercise, Manual, and Modality Logs for complete treatment.       Assessment/Plan  Good effort with progression of strengthening and balance exercises.  Pt had c/o fatigue by the end of session, especially with addition of standing t-band hip abd.    Progress per Plan of Care           Timed:         Manual Therapy:    8     mins  22332;     Therapeutic Exercise:    30    mins  36312;     Neuromuscular Jessica:    15    mins  32477;        Timed Treatment:   53   mins   Total Treatment:     53   mins        Angel Nolen PTA  Physical Therapist Assistant

## 2023-04-24 ENCOUNTER — CLINICAL SUPPORT (OUTPATIENT)
Dept: FAMILY MEDICINE CLINIC | Facility: CLINIC | Age: 73
End: 2023-04-24
Payer: MEDICARE

## 2023-04-24 DIAGNOSIS — E34.9 TESTOSTERONE DEFICIENCY: ICD-10-CM

## 2023-04-24 RX ADMIN — TESTOSTERONE CYPIONATE 200 MG: 200 INJECTION, SOLUTION INTRAMUSCULAR at 09:45

## 2023-04-26 ENCOUNTER — TELEPHONE (OUTPATIENT)
Dept: PHYSICAL THERAPY | Facility: CLINIC | Age: 73
End: 2023-04-26

## 2023-04-28 ENCOUNTER — TREATMENT (OUTPATIENT)
Dept: PHYSICAL THERAPY | Facility: CLINIC | Age: 73
End: 2023-04-28
Payer: MEDICARE

## 2023-04-28 DIAGNOSIS — Z74.09 IMPAIRED FUNCTIONAL MOBILITY, BALANCE, GAIT, AND ENDURANCE: ICD-10-CM

## 2023-04-28 DIAGNOSIS — H83.2X3 VESTIBULAR HYPOFUNCTION OF BOTH EARS: ICD-10-CM

## 2023-04-28 DIAGNOSIS — R26.81 UNSTEADINESS ON FEET: ICD-10-CM

## 2023-04-28 DIAGNOSIS — R42 VERTIGO: Primary | ICD-10-CM

## 2023-04-28 DIAGNOSIS — R26.2 DIFFICULTY WALKING: ICD-10-CM

## 2023-04-28 DIAGNOSIS — R29.898 WEAKNESS OF BOTH LOWER EXTREMITIES: ICD-10-CM

## 2023-04-28 PROCEDURE — 97112 NEUROMUSCULAR REEDUCATION: CPT | Performed by: PHYSICAL THERAPIST

## 2023-04-28 PROCEDURE — 97110 THERAPEUTIC EXERCISES: CPT | Performed by: PHYSICAL THERAPIST

## 2023-04-28 PROCEDURE — 97140 MANUAL THERAPY 1/> REGIONS: CPT | Performed by: PHYSICAL THERAPIST

## 2023-04-28 NOTE — PROGRESS NOTES
Physical Therapy Daily Progress Note      Patient: Arik August   : 1950  Diagnosis/ICD-10 Code:  Vertigo [R42]  Referring practitioner: ANTONIA Tapia  Date of Initial Visit: Type: THERAPY  Noted: 2023  Today's Date: 2023  Patient seen for 5 sessions             Subjective Pt reports noticing improvements.  He says it's good to know that he is going forward instead of going backwards    Objective   See Exercise, Manual, and Modality Logs for complete treatment.       Assessment/Plan  Good tolerance and response with progression and addition of strengthening and balance exercises    Progress per Plan of Care           Timed:         Manual Therapy:   10     mins  42873;     Therapeutic Exercise:    30     mins  48339;     Neuromuscular Jessica:    15    mins  56782;        Timed Treatment:   55   mins   Total Treatment:     55   mins        Angel Nolen PTA  Physical Therapist Assistant

## 2023-05-01 RX ORDER — LANOLIN ALCOHOL/MO/W.PET/CERES
CREAM (GRAM) TOPICAL
Qty: 90 TABLET | Refills: 0 | Status: SHIPPED | OUTPATIENT
Start: 2023-05-01

## 2023-05-03 ENCOUNTER — TREATMENT (OUTPATIENT)
Dept: PHYSICAL THERAPY | Facility: CLINIC | Age: 73
End: 2023-05-03
Payer: MEDICARE

## 2023-05-03 DIAGNOSIS — H83.2X3 VESTIBULAR HYPOFUNCTION OF BOTH EARS: ICD-10-CM

## 2023-05-03 DIAGNOSIS — R42 VERTIGO: Primary | ICD-10-CM

## 2023-05-03 DIAGNOSIS — R26.2 DIFFICULTY WALKING: ICD-10-CM

## 2023-05-03 DIAGNOSIS — Z74.09 IMPAIRED FUNCTIONAL MOBILITY, BALANCE, GAIT, AND ENDURANCE: ICD-10-CM

## 2023-05-03 DIAGNOSIS — R26.81 UNSTEADINESS ON FEET: ICD-10-CM

## 2023-05-03 DIAGNOSIS — R29.898 WEAKNESS OF BOTH LOWER EXTREMITIES: ICD-10-CM

## 2023-05-03 PROCEDURE — 97110 THERAPEUTIC EXERCISES: CPT | Performed by: PHYSICAL THERAPIST

## 2023-05-03 PROCEDURE — 97530 THERAPEUTIC ACTIVITIES: CPT | Performed by: PHYSICAL THERAPIST

## 2023-05-03 PROCEDURE — 97112 NEUROMUSCULAR REEDUCATION: CPT | Performed by: PHYSICAL THERAPIST

## 2023-05-03 PROCEDURE — 97140 MANUAL THERAPY 1/> REGIONS: CPT | Performed by: PHYSICAL THERAPIST

## 2023-05-03 NOTE — PROGRESS NOTES
Physical Therapy Daily Treatment Note      Patient: Arik August   : 1950  Diagnosis/ICD-10 Code:  Vertigo [R42]  Referring practitioner: ANTONIA Tapia  Date of Initial Visit: Type: THERAPY  Noted: 2023  Today's Date: 5/3/2023  Patient seen for 6 sessions         Arik August reports: his vertigo continues but he states he does feel he has improved balance and strength. Pt. States walking in his yard and on carpet are both still difficult but in some instances has seen improvement there as well. Pt. States he has been doing his exercises twice a day.    Objective   See Exercise, Manual, and Modality Logs for complete treatment.     Assessment/Plan   Pt. tolerates exercise well this date reducing leg press to 70 with pt. Reporting excess fatigue and soreness after increase to 80 lbs at the last visit. Pt. Benefits from balance activities and kamron test stretching as well at this time program is providing a good challenge and will be modified as necessary for best results.    Goals  Plan Goals: STG to be met in 3 wk:  - Pt to report 25% improvement in frequency of dizziness.  - Pt to tolerate NuStep x10 min for improved functional activity tolerance.  - Pt to be independent with HEP.  LTG to be met in 3 wk:  - Improve DHI to 25% or less impairment.  - Improve sensory integration as evidenced by MCTSIB of 3/4 in order to walk to the bathroom at night.  - Increase L hip flex to 4-/5 or greater for improved foot clearance when walking.  - Pt to stand on foam surface x1 min with WBOS, no UE support and no LOB in order to walk to communion at [email or web address].    Progress strengthening /stabilization /functional activity           Timed:         Manual Therapy:    10     mins  15329;     Therapeutic Exercise:    15     mins  56558;     Neuromuscular Jessica:    15    mins  18451;    Therapeutic Activity:     15     mins  89284;       Timed Treatment:   55   mins   Total Treatment:     55   mins    Nicole  CINTHIA Monique  Physical Therapist Assistant License #[id number]

## 2023-05-05 ENCOUNTER — TREATMENT (OUTPATIENT)
Dept: PHYSICAL THERAPY | Facility: CLINIC | Age: 73
End: 2023-05-05
Payer: MEDICARE

## 2023-05-05 DIAGNOSIS — H83.2X3 VESTIBULAR HYPOFUNCTION OF BOTH EARS: ICD-10-CM

## 2023-05-05 DIAGNOSIS — R42 VERTIGO: Primary | ICD-10-CM

## 2023-05-05 DIAGNOSIS — R26.2 DIFFICULTY WALKING: ICD-10-CM

## 2023-05-05 DIAGNOSIS — Z74.09 IMPAIRED FUNCTIONAL MOBILITY, BALANCE, GAIT, AND ENDURANCE: ICD-10-CM

## 2023-05-05 DIAGNOSIS — R29.898 WEAKNESS OF BOTH LOWER EXTREMITIES: ICD-10-CM

## 2023-05-05 DIAGNOSIS — R26.81 UNSTEADINESS ON FEET: ICD-10-CM

## 2023-05-05 PROCEDURE — 97112 NEUROMUSCULAR REEDUCATION: CPT | Performed by: PHYSICAL THERAPIST

## 2023-05-05 PROCEDURE — 97110 THERAPEUTIC EXERCISES: CPT | Performed by: PHYSICAL THERAPIST

## 2023-05-05 PROCEDURE — 97530 THERAPEUTIC ACTIVITIES: CPT | Performed by: PHYSICAL THERAPIST

## 2023-05-05 NOTE — PROGRESS NOTES
Physical Therapy Daily Treatment Note      Patient: Arik August   : 1950  Diagnosis/ICD-10 Code:  Vertigo [R42]  Referring practitioner: ANTONIA Tapia  Date of Initial Visit: Type: THERAPY  Noted: 2023  Today's Date: 2023  Patient seen for 7 sessions         Arik August reports: he is slowly progressing with balance and state she has began walking about 500 feet intervals without cane to challenge balance and endurance at home.    Objective   See Exercise, Manual, and Modality Logs for complete treatment.     Assessment/Plan   Pt. Tolerates treatment well this visit and shows improving strength and stability Pt. will benefit from continued increase in challenge and was encourgaed to continue monitoring dizziness     Goals  Plan Goals: STG to be met in 3 wk:  - Pt to report 25% improvement in frequency of dizziness.  - Pt to tolerate NuStep x10 min for improved functional activity tolerance.  - Pt to be independent with HEP.  LTG to be met in 3 wk:  - Improve DHI to 25% or less impairment.  - Improve sensory integration as evidenced by MCTSIB of 3/4 in order to walk to the bathroom at night.  - Increase L hip flex to 4-/5 or greater for improved foot clearance when walking.  - Pt to stand on foam surface x1 min with WBOS, no UE support and no LOB in order to walk to communion at [employer].    Progress strengthening /stabilization /functional activity         Timed:            Therapeutic Exercise:    15     mins  22481;     Neuromuscular Jessica:    15    mins  73690;    Therapeutic Activity:     15     mins  84262;         Timed Treatment:   45   mins   Total Treatment:     45   mins    Nicole Monique PTA  Physical Therapist Assistant License #[id number]

## 2023-05-08 ENCOUNTER — CLINICAL SUPPORT (OUTPATIENT)
Dept: FAMILY MEDICINE CLINIC | Facility: CLINIC | Age: 73
End: 2023-05-08
Payer: MEDICARE

## 2023-05-08 DIAGNOSIS — E34.9 TESTOSTERONE DEFICIENCY: ICD-10-CM

## 2023-05-08 RX ADMIN — TESTOSTERONE CYPIONATE 200 MG: 200 INJECTION, SOLUTION INTRAMUSCULAR at 09:16

## 2023-05-09 RX ORDER — TAMSULOSIN HYDROCHLORIDE 0.4 MG/1
CAPSULE ORAL
Qty: 90 CAPSULE | Refills: 0 | Status: SHIPPED | OUTPATIENT
Start: 2023-05-09

## 2023-05-10 ENCOUNTER — TREATMENT (OUTPATIENT)
Dept: PHYSICAL THERAPY | Facility: CLINIC | Age: 73
End: 2023-05-10
Payer: MEDICARE

## 2023-05-10 DIAGNOSIS — H83.2X3 VESTIBULAR HYPOFUNCTION OF BOTH EARS: ICD-10-CM

## 2023-05-10 DIAGNOSIS — R42 VERTIGO: Primary | ICD-10-CM

## 2023-05-10 DIAGNOSIS — R29.898 WEAKNESS OF BOTH LOWER EXTREMITIES: ICD-10-CM

## 2023-05-10 DIAGNOSIS — Z74.09 IMPAIRED FUNCTIONAL MOBILITY, BALANCE, GAIT, AND ENDURANCE: ICD-10-CM

## 2023-05-10 DIAGNOSIS — R26.81 UNSTEADINESS ON FEET: ICD-10-CM

## 2023-05-10 DIAGNOSIS — R26.2 DIFFICULTY WALKING: ICD-10-CM

## 2023-05-10 NOTE — PROGRESS NOTES
Physical Therapy Progress Note/Reassessment                              [address] [place] [address] [address]. [address], [address], [place]  [address]    Patient: Arik August   : 1950  Diagnosis/ICD-10 Code:  Vertigo [R42]  Referring practitioner: ANTONIA Tapia  Date of Initial Evaluation:  Type: THERAPY  Noted: 2023  Patient seen for 8 sessions      Subjective:   Visit Diagnoses:    ICD-10-CM ICD-9-CM   1. Vertigo  R42 780.4   2. Difficulty walking  R26.2 719.7   3. Impaired functional mobility, balance, gait, and endurance  Z74.09 V49.89   4. Vestibular hypofunction of both ears  H83.2X3 386.50   5. Unsteadiness on feet  R26.81 781.2   6. Weakness of both lower extremities  R29.898 729.89         Arik August reports he may have overdone it yesterday. 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 He walked a half mile without his cane, it wasn't really steady but he was able to do it. He notes he wasn't able to do that for a long time. Today his balance isn't nearly as good. He notes he felt like he was making progress until today. He feels better but has a long way to go until he's satisfied with where he's at. Some days he can do the drills with moving his head without trouble, other days he gets lightheaded.   Subjective Questionnaire: DHI: 46%  Clinical Progress: improved  Home Program Compliance: Yes  Treatment has included: therapeutic exercise and neuromuscular re-education      Subjective       Objective   Improvement in frequency of dizziness: at least 40% better  MCTSIB: Firm:  EO 30+s, EC 3s, Foam: EO 30+s, EC 3s  L hip flex strength: 4/5  Foam standing w/WBOS:     Assessment/Plan   Christophe is demonstrating significant improvement in strength & balance since IE. He still benefits from SPC use, particularly when fatigued. He has a very difficult time with eyes closed on & off foam. Hip flexor stretching is very relieving to B legs. Pt will cont to benefit from skilled PT to promote indep. Ambulation & safety.     Plan Goals: STG  to be met in 3 wk:  - Pt to report 25% improvement in frequency of dizziness. - MET 40% on 5/10/23  - Pt to tolerate NuStep x10 min for improved functional activity tolerance. - MET  - Pt to be independent with HEP. - MET  LTG to be met in 3 wk:  - Improve DHI to 25% or less impairment. - Progressing 46% on 5/10/23  - Improve sensory integration as evidenced by MCTSIB of 3/4 in order to walk to the bathroom at night. NOT MET, 2/4 on 5/10/23 (EO on firm & foam, significantly limited w/eyes closed)  - Increase L hip flex to 4-/5 or greater for improved foot clearance when walking. - MET  - Pt to stand on foam surface x1 min with WBOS, no UE support and no LOB in order to walk to communion at [employer].     Recommendations: Continue as planned  Timeframe: 2 months  Prognosis to achieve goals: good      Timed:         Manual Therapy:         mins  62902;     Therapeutic Exercise:    20     mins  40472;     Neuromuscular eJssica:    25    mins  58946;    Therapeutic Activity:          mins  20763;     Gait Training:           mins  87345;     Ultrasound:          mins  51218;    Ionto                                   mins  82055  Self Care                            mins  02571  Canalith Repos         mins  36577  Tests & Measures         15      mins  35928     Un-Timed:  Electrical Stimulation:         mins  40978 ( );  Dry Needling          mins self-pay  Traction          mins 02899      Timed Treatment:   60   mins   Total Treatment:     60   mins    PT Signature: Gabriela Lovelace, PT, DPT, cert. DN  [place] License: [id number]

## 2023-05-12 ENCOUNTER — TREATMENT (OUTPATIENT)
Dept: PHYSICAL THERAPY | Facility: CLINIC | Age: 73
End: 2023-05-12
Payer: MEDICARE

## 2023-05-12 DIAGNOSIS — R26.2 DIFFICULTY WALKING: Primary | ICD-10-CM

## 2023-05-12 DIAGNOSIS — Z74.09 IMPAIRED FUNCTIONAL MOBILITY, BALANCE, GAIT, AND ENDURANCE: ICD-10-CM

## 2023-05-12 DIAGNOSIS — H83.2X3 VESTIBULAR HYPOFUNCTION OF BOTH EARS: ICD-10-CM

## 2023-05-12 DIAGNOSIS — R29.898 WEAKNESS OF BOTH LOWER EXTREMITIES: ICD-10-CM

## 2023-05-12 DIAGNOSIS — R42 VERTIGO: ICD-10-CM

## 2023-05-12 DIAGNOSIS — R26.81 UNSTEADINESS ON FEET: ICD-10-CM

## 2023-05-12 NOTE — PROGRESS NOTES
Physical Therapy Daily Treatment Note  [address] [facility] [address], [address], [place]  [address]    Patient: Arik August   : 1950  Diagnosis/ICD-10 Code:  Difficulty walking [R26.2]   Problems Addressed this Visit    None  Visit Diagnoses     Difficulty walking    -  Primary    Impaired functional mobility, balance, gait, and endurance        Weakness of both lower extremities        Vertigo        Vestibular hypofunction of both ears        Unsteadiness on feet          Diagnoses       Codes Comments    Difficulty walking    -  Primary ICD-10-CM: R26.2  ICD-9-CM: 719.7     Impaired functional mobility, balance, gait, and endurance     ICD-10-CM: Z74.09  ICD-9-CM: V49.89     Weakness of both lower extremities     ICD-10-CM: R29.898  ICD-9-CM: 729.89     Vertigo     ICD-10-CM: R42  ICD-9-CM: 780.4     Vestibular hypofunction of both ears     ICD-10-CM: H83.2X3  ICD-9-CM: 386.50     Unsteadiness on feet     ICD-10-CM: R26.81  ICD-9-CM: 781.2         Referring practitioner: ANTONIA Tapia  Date of Initial Visit: Type: THERAPY  Noted: 2023  Today's Date: 2023    VISIT#: 9    Subjective   Christophe reports he seems to have a lot of difficulty walking downhill, not so much bc of weakness, but his balance is off. He also notes any time he gets his feet off the ground and looks down he gets dizzy & shaky.     Objective     See Exercise, Manual, and Modality Logs for complete treatment.     Assessment/Plan  Evidence of poor proprioception. Added some decline challenges for pt today in addition to gastroc stretching on incline board. Pt reports stretches feel really good and he thinks that will be helpful. SPC use throughout clinic.   Progress strengthening /stabilization /functional activity         Timed:         Manual Therapy:    14     mins  07044;     Therapeutic Exercise:    15     mins  13497;     Neuromuscular Jessica:    25    mins  10933;    Therapeutic Activity:          mins  24333;     Gait Training:            mins  91547;     Ultrasound:          mins  31366;    Ionto                                   mins   29535  Self Care                       3     mins   73915  Explained proprioception  Canalith Repos                   mins  37592  Tests & Measures              mins   56745      Un-Timed:  Electrical Stimulation:         mins  79991 ( );  Dry Needling          mins 88264/75630  Traction          mins 19479  Low Eval          Mins  62221  Mod Eval          Mins  44622  High Eval                            Mins  55788  Re-Eval                               mins  96090    Timed Treatment:   57   mins   Total Treatment:     57   mins    Gabriela Lovelace, PT, DPT, cert. DN  Physical Therapist  [place] Lic # [id number]

## 2023-05-15 RX ORDER — PANTOPRAZOLE SODIUM 40 MG/1
TABLET, DELAYED RELEASE ORAL
Qty: 90 TABLET | Refills: 1 | Status: SHIPPED | OUTPATIENT
Start: 2023-05-15

## 2023-05-17 ENCOUNTER — TREATMENT (OUTPATIENT)
Dept: PHYSICAL THERAPY | Facility: CLINIC | Age: 73
End: 2023-05-17
Payer: MEDICARE

## 2023-05-17 DIAGNOSIS — Z74.09 IMPAIRED FUNCTIONAL MOBILITY, BALANCE, GAIT, AND ENDURANCE: ICD-10-CM

## 2023-05-17 DIAGNOSIS — R29.898 WEAKNESS OF BOTH LOWER EXTREMITIES: ICD-10-CM

## 2023-05-17 DIAGNOSIS — H83.2X3 VESTIBULAR HYPOFUNCTION OF BOTH EARS: ICD-10-CM

## 2023-05-17 DIAGNOSIS — R42 VERTIGO: ICD-10-CM

## 2023-05-17 DIAGNOSIS — R26.2 DIFFICULTY WALKING: Primary | ICD-10-CM

## 2023-05-17 DIAGNOSIS — R26.81 UNSTEADINESS ON FEET: ICD-10-CM

## 2023-05-17 NOTE — PROGRESS NOTES
Physical Therapy Daily Treatment Note  [address] [facility] [address], [address], [place]  [address]    Patient: Arik August   : 1950  Diagnosis/ICD-10 Code:  Difficulty walking [R26.2]   Problems Addressed this Visit    None  Visit Diagnoses     Difficulty walking    -  Primary    Impaired functional mobility, balance, gait, and endurance        Weakness of both lower extremities        Vertigo        Vestibular hypofunction of both ears        Unsteadiness on feet          Diagnoses       Codes Comments    Difficulty walking    -  Primary ICD-10-CM: R26.2  ICD-9-CM: 719.7     Impaired functional mobility, balance, gait, and endurance     ICD-10-CM: Z74.09  ICD-9-CM: V49.89     Weakness of both lower extremities     ICD-10-CM: R29.898  ICD-9-CM: 729.89     Vertigo     ICD-10-CM: R42  ICD-9-CM: 780.4     Vestibular hypofunction of both ears     ICD-10-CM: H83.2X3  ICD-9-CM: 386.50     Unsteadiness on feet     ICD-10-CM: R26.81  ICD-9-CM: 781.2         Referring practitioner: ANTONIA Tapia  Date of Initial Visit: Type: THERAPY  Noted: 2023  Today's Date: 2023    VISIT#: 10    Subjective   Christophe reports he felt dizzy for a little longer after last session than normal bc of the new challenges. He notes he's been working on heel raises and stretching at home.     Objective     See Exercise, Manual, and Modality Logs for complete treatment.     Assessment/Plan  Christophe tolerated balance challenges well overall. He cont to report instability on uneven surfaces, particularly walking downhill.   Progress strengthening /stabilization /functional activity         Timed:         Manual Therapy:         mins  11674;     Therapeutic Exercise:    15     mins  22108;     Neuromuscular Jessica:    40    mins  52323;    Therapeutic Activity:          mins  06681;     Gait Training:           mins  01304;     Ultrasound:          mins  86712;    Ionto                                   mins   30861  Self Care                             mins   60979  Canalith Repos                   mins  96533  Tests & Measures              mins   21070      Un-Timed:  Electrical Stimulation:         mins  08649 ( );  Dry Needling          mins 90512/80355  Traction          mins 17771  Low Eval          Mins  85820  Mod Eval          Mins  46661  High Eval                            Mins  35477  Re-Eval                               mins  62385    Timed Treatment:   55   mins   Total Treatment:     55   mins    Gabriela Lovelace, PT, DPT, cert. DN  Physical Therapist  [place] Lic # [id number]

## 2023-05-19 ENCOUNTER — TREATMENT (OUTPATIENT)
Dept: PHYSICAL THERAPY | Facility: CLINIC | Age: 73
End: 2023-05-19
Payer: MEDICARE

## 2023-05-19 DIAGNOSIS — R26.2 DIFFICULTY WALKING: Primary | ICD-10-CM

## 2023-05-19 DIAGNOSIS — R42 VERTIGO: ICD-10-CM

## 2023-05-19 DIAGNOSIS — Z74.09 IMPAIRED FUNCTIONAL MOBILITY, BALANCE, GAIT, AND ENDURANCE: ICD-10-CM

## 2023-05-19 DIAGNOSIS — R26.81 UNSTEADINESS ON FEET: ICD-10-CM

## 2023-05-19 DIAGNOSIS — H83.2X3 VESTIBULAR HYPOFUNCTION OF BOTH EARS: ICD-10-CM

## 2023-05-19 DIAGNOSIS — R29.898 WEAKNESS OF BOTH LOWER EXTREMITIES: ICD-10-CM

## 2023-05-19 PROCEDURE — 97112 NEUROMUSCULAR REEDUCATION: CPT | Performed by: PHYSICAL THERAPIST

## 2023-05-19 PROCEDURE — 97530 THERAPEUTIC ACTIVITIES: CPT | Performed by: PHYSICAL THERAPIST

## 2023-05-19 PROCEDURE — 97110 THERAPEUTIC EXERCISES: CPT | Performed by: PHYSICAL THERAPIST

## 2023-05-19 NOTE — PROGRESS NOTES
Physical Therapy Daily Treatment Note      Patient: Arik August   : 1950  Diagnosis/ICD-10 Code:  Difficulty walking [R26.2]  Referring practitioner: ANTONIA Tapia  Date of Initial Visit: Type: THERAPY  Noted: 2023  Today's Date: 2023  Patient seen for 11 sessions         Arik August reports: he continues to feel improved not fully better but improving. Pt. States he has even done some walking without his cane which he has not even considered doing for a while.    Objective   See Exercise, Manual, and Modality Logs for complete treatment.     Assessment/Plan   Pt. is showing improving tolerance to activity in therapy and responds well to progressively tougher balance activities challenging proprioception. Pt. Is also improving from stretches for hip flexors and calves at this time.    Plan Goals: STG to be met in 3 wk:  - Pt to report 25% improvement in frequency of dizziness. - MET 40% on 5/10/23  - Pt to tolerate NuStep x10 min for improved functional activity tolerance. - MET  - Pt to be independent with HEP. - MET  LTG to be met in 3 wk:  - Improve DHI to 25% or less impairment. - Progressing 46% on 5/10/23  - Improve sensory integration as evidenced by MCTSIB of 3/4 in order to walk to the bathroom at night. NOT MET, 2/4 on 5/10/23 (EO on firm & foam, significantly limited w/eyes closed)  - Increase L hip flex to 4-/5 or greater for improved foot clearance when walking. - MET  - Pt to stand on foam surface x1 min with WBOS, no UE support and no LOB in order to walk to communion at [employer].    Progress strengthening /stabilization /functional activity           Timed:           Therapeutic Exercise:    25     mins  05363;     Neuromuscular Jessica:    15    mins  93421;    Therapeutic Activity:     15     mins  13854;         Timed Treatment:   55   mins   Total Treatment:     55   mins    Nicole Monique PTA  Physical Therapist Assistant License #[id number]

## 2023-05-22 ENCOUNTER — CLINICAL SUPPORT (OUTPATIENT)
Dept: FAMILY MEDICINE CLINIC | Facility: CLINIC | Age: 73
End: 2023-05-22
Payer: MEDICARE

## 2023-05-22 DIAGNOSIS — E29.1 DEFICIENCY OF TESTOSTERONE BIOSYNTHESIS: ICD-10-CM

## 2023-05-22 PROCEDURE — 96372 THER/PROPH/DIAG INJ SC/IM: CPT | Performed by: PHYSICIAN ASSISTANT

## 2023-05-22 RX ADMIN — TESTOSTERONE CYPIONATE 200 MG: 200 INJECTION, SOLUTION INTRAMUSCULAR at 09:40

## 2023-05-24 ENCOUNTER — TREATMENT (OUTPATIENT)
Dept: PHYSICAL THERAPY | Facility: CLINIC | Age: 73
End: 2023-05-24
Payer: MEDICARE

## 2023-05-24 DIAGNOSIS — R26.2 DIFFICULTY WALKING: Primary | ICD-10-CM

## 2023-05-24 DIAGNOSIS — R26.81 UNSTEADINESS ON FEET: ICD-10-CM

## 2023-05-24 DIAGNOSIS — H83.2X3 VESTIBULAR HYPOFUNCTION OF BOTH EARS: ICD-10-CM

## 2023-05-24 DIAGNOSIS — Z74.09 IMPAIRED FUNCTIONAL MOBILITY, BALANCE, GAIT, AND ENDURANCE: ICD-10-CM

## 2023-05-24 DIAGNOSIS — R29.898 WEAKNESS OF BOTH LOWER EXTREMITIES: ICD-10-CM

## 2023-05-24 NOTE — PROGRESS NOTES
Physical Therapy Daily Treatment Note  [address] [facility] [address], [address], [place]  [address]    Patient: Arik August   : 1950  Diagnosis/ICD-10 Code:  Difficulty walking [R26.2]   Problems Addressed this Visit    None  Visit Diagnoses     Difficulty walking    -  Primary    Impaired functional mobility, balance, gait, and endurance        Weakness of both lower extremities        Vestibular hypofunction of both ears        Unsteadiness on feet          Diagnoses       Codes Comments    Difficulty walking    -  Primary ICD-10-CM: R26.2  ICD-9-CM: 719.7     Impaired functional mobility, balance, gait, and endurance     ICD-10-CM: Z74.09  ICD-9-CM: V49.89     Weakness of both lower extremities     ICD-10-CM: R29.898  ICD-9-CM: 729.89     Vestibular hypofunction of both ears     ICD-10-CM: H83.2X3  ICD-9-CM: 386.50     Unsteadiness on feet     ICD-10-CM: R26.81  ICD-9-CM: 781.2         Referring practitioner: ANTONIA Tapia  Date of Initial Visit: Type: THERAPY  Noted: 2023  Today's Date: 2023    VISIT#: 12    Subjective   Christophe reports he got a balance board and a foam pad to practice balance at home. He notes his balance is improving, though is still using SPC.     Objective     See Exercise, Manual, and Modality Logs for complete treatment.     Assessment/Plan  Christophe did very well w/NMR today. He reported feeling less balanced today but through progressed balance challenges including manual perturbations. Intro'd dynamic gait challenges today as well (CGA on GB) using SPC though no LOB.   Progress per Plan of Care         Timed:         Manual Therapy:    8     mins  09169;     Therapeutic Exercise:    15     mins  07829;     Neuromuscular Jessica:    30   mins  65971;    Therapeutic Activity:          mins  48167;     Gait Training:           mins  09894;     Ultrasound:          mins  56362;    Ionto                                   mins   45572  Self Care                            mins    96816  Canalith Repos                   mins  96348  Tests & Measures              mins   22887      Un-Timed:  Electrical Stimulation:         mins  17982 ( );  Dry Needling          mins 06786/98927  Traction          mins 14331  Low Eval          Mins  62642  Mod Eval          Mins  13127  High Eval                            Mins  01138  Re-Eval                               mins  28998    Timed Treatment:   53   mins   Total Treatment:     53   mins    Gabriela Lovelace, PT, DPT, cert. DN  Physical Therapist  [place] Lic # [id number]

## 2023-05-30 DIAGNOSIS — I10 ESSENTIAL HYPERTENSION: Primary | ICD-10-CM

## 2023-05-31 ENCOUNTER — TREATMENT (OUTPATIENT)
Dept: PHYSICAL THERAPY | Facility: CLINIC | Age: 73
End: 2023-05-31

## 2023-05-31 ENCOUNTER — LAB (OUTPATIENT)
Dept: FAMILY MEDICINE CLINIC | Facility: CLINIC | Age: 73
End: 2023-05-31

## 2023-05-31 DIAGNOSIS — R26.81 UNSTEADINESS ON FEET: ICD-10-CM

## 2023-05-31 DIAGNOSIS — R42 VERTIGO: ICD-10-CM

## 2023-05-31 DIAGNOSIS — R26.2 DIFFICULTY WALKING: Primary | ICD-10-CM

## 2023-05-31 DIAGNOSIS — R29.898 WEAKNESS OF BOTH LOWER EXTREMITIES: ICD-10-CM

## 2023-05-31 DIAGNOSIS — H83.2X3 VESTIBULAR HYPOFUNCTION OF BOTH EARS: ICD-10-CM

## 2023-05-31 DIAGNOSIS — Z74.09 IMPAIRED FUNCTIONAL MOBILITY, BALANCE, GAIT, AND ENDURANCE: ICD-10-CM

## 2023-05-31 DIAGNOSIS — I10 ESSENTIAL HYPERTENSION: ICD-10-CM

## 2023-05-31 LAB
ANION GAP SERPL CALCULATED.3IONS-SCNC: 11.9 MMOL/L (ref 5–15)
BUN SERPL-MCNC: 23 MG/DL (ref 8–23)
BUN/CREAT SERPL: 24 (ref 7–25)
CALCIUM SPEC-SCNC: 9.4 MG/DL (ref 8.6–10.5)
CHLORIDE SERPL-SCNC: 99 MMOL/L (ref 98–107)
CO2 SERPL-SCNC: 26.1 MMOL/L (ref 22–29)
CREAT SERPL-MCNC: 0.96 MG/DL (ref 0.76–1.27)
EGFRCR SERPLBLD CKD-EPI 2021: 84.5 ML/MIN/1.73
GLUCOSE SERPL-MCNC: 105 MG/DL (ref 65–99)
POTASSIUM SERPL-SCNC: 4.5 MMOL/L (ref 3.5–5.2)
SODIUM SERPL-SCNC: 137 MMOL/L (ref 136–145)

## 2023-05-31 PROCEDURE — 97110 THERAPEUTIC EXERCISES: CPT | Performed by: PHYSICAL THERAPIST

## 2023-05-31 PROCEDURE — 80048 BASIC METABOLIC PNL TOTAL CA: CPT | Performed by: NURSE PRACTITIONER

## 2023-05-31 PROCEDURE — 97530 THERAPEUTIC ACTIVITIES: CPT | Performed by: PHYSICAL THERAPIST

## 2023-05-31 PROCEDURE — 36415 COLL VENOUS BLD VENIPUNCTURE: CPT

## 2023-05-31 NOTE — PROGRESS NOTES
Physical Therapy Daily Treatment Note      Patient: Arik August   : 1950  Diagnosis/ICD-10 Code:  Difficulty walking [R26.2]   Problems Addressed this Visit    None  Visit Diagnoses     Difficulty walking    -  Primary    Impaired functional mobility, balance, gait, and endurance        Weakness of both lower extremities        Vestibular hypofunction of both ears        Unsteadiness on feet        Vertigo          Diagnoses       Codes Comments    Difficulty walking    -  Primary ICD-10-CM: R26.2  ICD-9-CM: 719.7     Impaired functional mobility, balance, gait, and endurance     ICD-10-CM: Z74.09  ICD-9-CM: V49.89     Weakness of both lower extremities     ICD-10-CM: R29.898  ICD-9-CM: 729.89     Vestibular hypofunction of both ears     ICD-10-CM: H83.2X3  ICD-9-CM: 386.50     Unsteadiness on feet     ICD-10-CM: R26.81  ICD-9-CM: 781.2     Vertigo     ICD-10-CM: R42  ICD-9-CM: 780.4         Referring practitioner: ANTONIA Tapia  Date of Initial Visit: Type: THERAPY  Noted: 2023  Today's Date: 2023    VISIT#: 13    Subjective : pt reports he feels like he is walking better but still has difficulty on grass and gravel. States he had a dizzy spell this morning and had to lay down for a while. Saw his surgeon yesterday and states he has a broken mitchel but they are not going to do anything about it right now.    Objective : After being on NuStep and doing a couple standing LE ther ex, pt requested to sit down for a minute. Pt reported he was having chest pain and took nitro. Chest pain improved within 3-4 min of taking nitro. PT assessed vitals after pt took nitro: , O2 98%, /94. After resting for ~5 min, BP then 162/78. Pt requested to end session and PT in agreement. Pt declined need to go to the hospital but stated he planned to drive to cardiologist office when leaving PT clinic. Pt reports this was the first time his chest pain has happened in public.     See Exercise, Manual,  and Modality Logs for complete treatment.     Assessment/Plan : Poor tolerance to ther ex due to chest pain that improved with nitro. Will resume ther ex next visit as tolerated.    Progress per Plan of Care         Timed:         Manual Therapy:         mins  29474;     Therapeutic Exercise:    15     mins  33559;     Neuromuscular Jessica:        mins  26738;    Therapeutic Activity:     8     mins  53958;     Gait Training:           mins  50892;     Ultrasound:          mins  27135;    Ionto                                   mins   87908  Self Care                            mins   29017    Un-Timed:  Electrical Stimulation:         mins  64496 ( );  Dry Needling          mins 46157/21449  Traction          mins 28757  Canalith Repos                   mins  51711  Low Eval          Mins  97791  Mod Eval          Mins  93814  High Eval                            Mins  79628  Re-Eval                               mins  67273    Timed Treatment:   23   mins   Total Treatment:     23   mins    Sulema Richter PT, CLT, Cert DN  Physical Therapist  [place] Lic # [id number]

## 2023-06-02 ENCOUNTER — TREATMENT (OUTPATIENT)
Dept: PHYSICAL THERAPY | Facility: CLINIC | Age: 73
End: 2023-06-02

## 2023-06-02 DIAGNOSIS — Z74.09 IMPAIRED FUNCTIONAL MOBILITY, BALANCE, GAIT, AND ENDURANCE: ICD-10-CM

## 2023-06-02 DIAGNOSIS — R26.81 UNSTEADINESS ON FEET: ICD-10-CM

## 2023-06-02 DIAGNOSIS — R26.2 DIFFICULTY WALKING: Primary | ICD-10-CM

## 2023-06-02 DIAGNOSIS — R29.898 WEAKNESS OF BOTH LOWER EXTREMITIES: ICD-10-CM

## 2023-06-02 DIAGNOSIS — H83.2X3 VESTIBULAR HYPOFUNCTION OF BOTH EARS: ICD-10-CM

## 2023-06-02 NOTE — PROGRESS NOTES
Physical Therapy Daily Treatment Note  [address] [facility] [address], [address], [place]  [address]    Patient: Arik August   : 1950  Diagnosis/ICD-10 Code:  Difficulty walking [R26.2]   Problems Addressed this Visit    None  Visit Diagnoses     Difficulty walking    -  Primary    Impaired functional mobility, balance, gait, and endurance        Weakness of both lower extremities        Vestibular hypofunction of both ears        Unsteadiness on feet          Diagnoses       Codes Comments    Difficulty walking    -  Primary ICD-10-CM: R26.2  ICD-9-CM: 719.7     Impaired functional mobility, balance, gait, and endurance     ICD-10-CM: Z74.09  ICD-9-CM: V49.89     Weakness of both lower extremities     ICD-10-CM: R29.898  ICD-9-CM: 729.89     Vestibular hypofunction of both ears     ICD-10-CM: H83.2X3  ICD-9-CM: 386.50     Unsteadiness on feet     ICD-10-CM: R26.81  ICD-9-CM: 781.2         Referring practitioner: ANTONIA Tapia  Date of Initial Visit: Type: THERAPY  Noted: 2023  Today's Date: 2023    VISIT#: 14    Subjective   Christophe notes he has felt perfectly normal since angina episode last tx session. He went straight to his doctor's office after PT and they inc'd his medication (isosorbide mononitrate) and he has f/u w/cardiologist on Wed.     Objective     See Exercise, Manual, and Modality Logs for complete treatment.     Assessment/Plan  Christophe is demonstrating dec'd difficulty with dynamic gait & balance in general. He denied any chest pains or SOB throughout today's session.   Progress per Plan of Care         Timed:         Manual Therapy:         mins  53120;     Therapeutic Exercise:    15     mins  16888;     Neuromuscular Jessica:    20    mins  09612;    Therapeutic Activity:     15     mins  37348;     Gait Training:      10     mins  80739;     Ultrasound:          mins  47957;    Ionto                                   mins   84339  Self Care                            mins   32694  Joon  Repos                   mins  78867  Tests & Measures              mins   36135      Un-Timed:  Electrical Stimulation:         mins  07099 ( );  Dry Needling          mins 50800/75832  Traction          mins 74192  Low Eval          Mins  95514  Mod Eval          Mins  56473  High Eval                            Mins  26783  Re-Eval                               mins  05382    Timed Treatment:   60   mins   Total Treatment:     60   mins    Gabriela Lovelace, PT, DPT, cert. DN  Physical Therapist  [place] Lic # [id number]

## 2023-06-05 ENCOUNTER — CLINICAL SUPPORT (OUTPATIENT)
Dept: FAMILY MEDICINE CLINIC | Facility: CLINIC | Age: 73
End: 2023-06-05
Payer: MEDICARE

## 2023-06-05 RX ADMIN — TESTOSTERONE CYPIONATE 200 MG: 200 INJECTION, SOLUTION INTRAMUSCULAR at 09:20

## 2023-06-07 ENCOUNTER — TREATMENT (OUTPATIENT)
Dept: PHYSICAL THERAPY | Facility: CLINIC | Age: 73
End: 2023-06-07
Payer: MEDICARE

## 2023-06-07 DIAGNOSIS — Z74.09 IMPAIRED FUNCTIONAL MOBILITY, BALANCE, GAIT, AND ENDURANCE: ICD-10-CM

## 2023-06-07 DIAGNOSIS — R26.81 UNSTEADINESS ON FEET: ICD-10-CM

## 2023-06-07 DIAGNOSIS — H83.2X3 VESTIBULAR HYPOFUNCTION OF BOTH EARS: ICD-10-CM

## 2023-06-07 DIAGNOSIS — R26.2 DIFFICULTY WALKING: Primary | ICD-10-CM

## 2023-06-07 DIAGNOSIS — R42 VERTIGO: ICD-10-CM

## 2023-06-07 DIAGNOSIS — R29.898 WEAKNESS OF BOTH LOWER EXTREMITIES: ICD-10-CM

## 2023-06-07 PROCEDURE — 97110 THERAPEUTIC EXERCISES: CPT | Performed by: PHYSICAL THERAPIST

## 2023-06-07 PROCEDURE — 97112 NEUROMUSCULAR REEDUCATION: CPT | Performed by: PHYSICAL THERAPIST

## 2023-06-07 PROCEDURE — 97530 THERAPEUTIC ACTIVITIES: CPT | Performed by: PHYSICAL THERAPIST

## 2023-06-07 NOTE — PROGRESS NOTES
Physical Therapy Daily Treatment Note      Patient: Arik August   : 1950  Diagnosis/ICD-10 Code:  Difficulty walking [R26.2]  Referring practitioner: ANTONIA Tapia  Date of Initial Visit: Type: THERAPY  Noted: 2023  Today's Date: 2023  Patient seen for 15 sessions         Arik August reports: he continues to improve slowly with balance but states the heart stuff that happened last visit scared him and he has an MD follow up to make sure everything is checking out at this time.    Objective   See Exercise, Manual, and Modality Logs for complete treatment.     Assessment/Plan  Pt. Tolerates exercise well and has no chest pain or other symptoms during treatment this date. Pt. is showing improvement in balance this date noticed when one LE was elevated to treadmill and balance was maintained for 1 min on each side without failure and without UE support.    Plan Goals: STG to be met in 3 wk:  - Pt to report 25% improvement in frequency of dizziness. - MET 40% on 5/10/23  - Pt to tolerate NuStep x10 min for improved functional activity tolerance. - MET  - Pt to be independent with HEP. - MET  LTG to be met in 3 wk:  - Improve DHI to 25% or less impairment. - Progressing 46% on 5/10/23  - Improve sensory integration as evidenced by MCTSIB of 3/4 in order to walk to the bathroom at night. NOT MET, 2/4 on 5/10/23 (EO on firm & foam, significantly limited w/eyes closed)  - Increase L hip flex to 4-/5 or greater for improved foot clearance when walking. - MET  - Pt to stand on foam surface x1 min with WBOS, no UE support and no LOB in order to walk to communion at [facility].     Progress strengthening /stabilization /functional activity           Timed:           Therapeutic Exercise:    15     mins  35441;     Neuromuscular Jessica:    15    mins  81316;    Therapeutic Activity:     15     mins  30832;       Timed Treatment:   45   mins   Total Treatment:     45   mins    Nicole Monique PTA  Physical  Therapist Assistant License #[id number]

## 2023-06-12 RX ORDER — BUMETANIDE 2 MG/1
TABLET ORAL
Qty: 180 TABLET | Refills: 0 | Status: SHIPPED | OUTPATIENT
Start: 2023-06-12

## 2023-06-14 RX ORDER — ROSUVASTATIN CALCIUM 20 MG/1
TABLET, COATED ORAL
Qty: 90 TABLET | Refills: 1 | Status: SHIPPED | OUTPATIENT
Start: 2023-06-14

## 2023-06-19 ENCOUNTER — CLINICAL SUPPORT (OUTPATIENT)
Dept: FAMILY MEDICINE CLINIC | Facility: CLINIC | Age: 73
End: 2023-06-19
Payer: MEDICARE

## 2023-06-19 DIAGNOSIS — R79.89 LOW TESTOSTERONE: Primary | ICD-10-CM

## 2023-06-19 RX ADMIN — TESTOSTERONE CYPIONATE 200 MG: 200 INJECTION, SOLUTION INTRAMUSCULAR at 09:59

## 2023-07-25 ENCOUNTER — TREATMENT (OUTPATIENT)
Dept: PHYSICAL THERAPY | Facility: CLINIC | Age: 73
End: 2023-07-25
Payer: MEDICARE

## 2023-07-25 DIAGNOSIS — R26.2 DIFFICULTY WALKING: ICD-10-CM

## 2023-07-25 DIAGNOSIS — R42 VERTIGO: ICD-10-CM

## 2023-07-25 DIAGNOSIS — Z74.09 IMPAIRED FUNCTIONAL MOBILITY, BALANCE, GAIT, AND ENDURANCE: Primary | ICD-10-CM

## 2023-07-25 DIAGNOSIS — R29.898 WEAKNESS OF BOTH LOWER EXTREMITIES: ICD-10-CM

## 2023-07-25 DIAGNOSIS — R26.81 UNSTEADINESS ON FEET: ICD-10-CM

## 2023-07-25 DIAGNOSIS — H83.2X3 VESTIBULAR HYPOFUNCTION OF BOTH EARS: ICD-10-CM

## 2023-07-25 PROCEDURE — 97112 NEUROMUSCULAR REEDUCATION: CPT | Performed by: PHYSICAL THERAPIST

## 2023-07-25 PROCEDURE — 97140 MANUAL THERAPY 1/> REGIONS: CPT | Performed by: PHYSICAL THERAPIST

## 2023-07-25 PROCEDURE — 97110 THERAPEUTIC EXERCISES: CPT | Performed by: PHYSICAL THERAPIST

## 2023-07-25 PROCEDURE — 97530 THERAPEUTIC ACTIVITIES: CPT | Performed by: PHYSICAL THERAPIST

## 2023-07-25 NOTE — PROGRESS NOTES
Physical Therapy Daily Treatment Note      Patient: Arik August   : 1950  Diagnosis/ICD-10 Code:  Impaired functional mobility, balance, gait, and endurance [Z74.09]  Referring practitioner: ANTONIA Tapia  Date of Initial Visit: Type: THERAPY  Noted: 2023  Today's Date: 2023  Patient seen for 23 sessions         Arik August reports: he has been very sore but states it is because they are working him really hard at cardiac rehab and he has been doing a lot of work at his house as well. Pt. States his hip flexors continue to be significantly tight    Objective   See Exercise, Manual, and Modality Logs for complete treatment.     Assessment/Plan  Pt. Tolerates treatment well and shows good response to balance activities. Pt. Steadiness is improving with each session. Pt. Also ambulates with greater foot clearance. Pt. will continue to benefit from exercise and balance retraining for gait without AD eventually.      Plan Goals: STG to be met in 3 wk:  - Pt to report 25% improvement in frequency of dizziness. - MET 40% on 5/10/23  - Pt to tolerate NuStep x10 min for improved functional activity tolerance. - MET  - Pt to be independent with HEP. - MET  LTG to be met in 3 wk:  - Improve DHI to 25% or less impairment. - Progressing 46% on 5/10/23; 46% on 23  - Improve sensory integration as evidenced by MCTSIB of 3/4 in order to walk to the bathroom at night. NOT MET, 2/4 on 5/10/23 (EO on firm & foam, significantly limited w/eyes closed)  - Increase L hip flex to 4-/5 or greater for improved foot clearance when walking. - MET  - Pt to stand on foam surface x1 min with WBOS, no UE support and no LOB in order to walk to communion at [facility].   New Goals     Long-term goals (LTG): Pt will demonstrate at SLS at least 5s R & L for walking without cane     Progress strengthening /stabilization /functional activity           Timed:         Manual Therapy:    15     mins  60245;     Therapeutic  Exercise:    15     mins  43193;     Neuromuscular Jessica:    15    mins  49409;    Therapeutic Activity:     15     mins  74937;         Timed Treatment:   60   mins   Total Treatment:     60   mins    Nicole Monique PTA  Physical Therapist Assistant License #[id number]

## 2023-07-31 ENCOUNTER — CLINICAL SUPPORT (OUTPATIENT)
Dept: FAMILY MEDICINE CLINIC | Facility: CLINIC | Age: 73
End: 2023-07-31
Payer: MEDICARE

## 2023-07-31 DIAGNOSIS — E29.1 DEFICIENCY OF TESTOSTERONE BIOSYNTHESIS: Primary | ICD-10-CM

## 2023-07-31 PROCEDURE — 96372 THER/PROPH/DIAG INJ SC/IM: CPT | Performed by: NURSE PRACTITIONER

## 2023-07-31 RX ORDER — MAGNESIUM OXIDE TAB 400 MG (241.3 MG ELEMENTAL MG) 400 (241.3 MG) MG
TAB ORAL
Qty: 90 TABLET | Refills: 0 | Status: SHIPPED | OUTPATIENT
Start: 2023-07-31

## 2023-07-31 RX ADMIN — TESTOSTERONE CYPIONATE 200 MG: 200 INJECTION, SOLUTION INTRAMUSCULAR at 09:23

## 2023-08-01 ENCOUNTER — TREATMENT (OUTPATIENT)
Dept: PHYSICAL THERAPY | Facility: CLINIC | Age: 73
End: 2023-08-01
Payer: MEDICARE

## 2023-08-01 DIAGNOSIS — H83.2X3 VESTIBULAR HYPOFUNCTION OF BOTH EARS: ICD-10-CM

## 2023-08-01 DIAGNOSIS — R42 VERTIGO: ICD-10-CM

## 2023-08-01 DIAGNOSIS — R29.898 WEAKNESS OF BOTH LOWER EXTREMITIES: ICD-10-CM

## 2023-08-01 DIAGNOSIS — R26.81 UNSTEADINESS ON FEET: ICD-10-CM

## 2023-08-01 DIAGNOSIS — Z74.09 IMPAIRED FUNCTIONAL MOBILITY, BALANCE, GAIT, AND ENDURANCE: Primary | ICD-10-CM

## 2023-08-01 DIAGNOSIS — R26.2 DIFFICULTY WALKING: ICD-10-CM

## 2023-08-01 PROCEDURE — 97110 THERAPEUTIC EXERCISES: CPT | Performed by: PHYSICAL THERAPIST

## 2023-08-01 PROCEDURE — 97112 NEUROMUSCULAR REEDUCATION: CPT | Performed by: PHYSICAL THERAPIST

## 2023-08-01 PROCEDURE — 97530 THERAPEUTIC ACTIVITIES: CPT | Performed by: PHYSICAL THERAPIST

## 2023-08-01 PROCEDURE — 97140 MANUAL THERAPY 1/> REGIONS: CPT | Performed by: PHYSICAL THERAPIST

## 2023-08-03 ENCOUNTER — TREATMENT (OUTPATIENT)
Dept: PHYSICAL THERAPY | Facility: CLINIC | Age: 73
End: 2023-08-03
Payer: MEDICARE

## 2023-08-03 DIAGNOSIS — R29.898 WEAKNESS OF BOTH LOWER EXTREMITIES: ICD-10-CM

## 2023-08-03 DIAGNOSIS — R42 VERTIGO: ICD-10-CM

## 2023-08-03 DIAGNOSIS — Z74.09 IMPAIRED FUNCTIONAL MOBILITY, BALANCE, GAIT, AND ENDURANCE: Primary | ICD-10-CM

## 2023-08-03 DIAGNOSIS — H83.2X3 VESTIBULAR HYPOFUNCTION OF BOTH EARS: ICD-10-CM

## 2023-08-03 DIAGNOSIS — R26.2 DIFFICULTY WALKING: ICD-10-CM

## 2023-08-03 DIAGNOSIS — R26.81 UNSTEADINESS ON FEET: ICD-10-CM

## 2023-08-03 PROCEDURE — 97112 NEUROMUSCULAR REEDUCATION: CPT | Performed by: PHYSICAL THERAPIST

## 2023-08-03 PROCEDURE — 97140 MANUAL THERAPY 1/> REGIONS: CPT | Performed by: PHYSICAL THERAPIST

## 2023-08-03 PROCEDURE — 97530 THERAPEUTIC ACTIVITIES: CPT | Performed by: PHYSICAL THERAPIST

## 2023-08-03 PROCEDURE — 97110 THERAPEUTIC EXERCISES: CPT | Performed by: PHYSICAL THERAPIST

## 2023-08-14 ENCOUNTER — CLINICAL SUPPORT (OUTPATIENT)
Dept: FAMILY MEDICINE CLINIC | Facility: CLINIC | Age: 73
End: 2023-08-14
Payer: MEDICARE

## 2023-08-14 DIAGNOSIS — E34.9 TESTOSTERONE DEFICIENCY: Primary | ICD-10-CM

## 2023-08-14 PROCEDURE — 96372 THER/PROPH/DIAG INJ SC/IM: CPT | Performed by: NURSE PRACTITIONER

## 2023-08-14 RX ORDER — TAMSULOSIN HYDROCHLORIDE 0.4 MG/1
CAPSULE ORAL
Qty: 90 CAPSULE | Refills: 0 | Status: SHIPPED | OUTPATIENT
Start: 2023-08-14

## 2023-08-14 RX ADMIN — TESTOSTERONE CYPIONATE 200 MG: 200 INJECTION, SOLUTION INTRAMUSCULAR at 09:43

## 2023-08-17 ENCOUNTER — TREATMENT (OUTPATIENT)
Dept: PHYSICAL THERAPY | Facility: CLINIC | Age: 73
End: 2023-08-17
Payer: MEDICARE

## 2023-08-17 DIAGNOSIS — R26.81 UNSTEADINESS ON FEET: ICD-10-CM

## 2023-08-17 DIAGNOSIS — H83.2X3 VESTIBULAR HYPOFUNCTION OF BOTH EARS: ICD-10-CM

## 2023-08-17 DIAGNOSIS — Z74.09 IMPAIRED FUNCTIONAL MOBILITY, BALANCE, GAIT, AND ENDURANCE: Primary | ICD-10-CM

## 2023-08-17 DIAGNOSIS — R29.898 WEAKNESS OF BOTH LOWER EXTREMITIES: ICD-10-CM

## 2023-08-17 DIAGNOSIS — R26.2 DIFFICULTY WALKING: ICD-10-CM

## 2023-08-17 NOTE — PROGRESS NOTES
Physical Therapy Progress Note/Reassessment                              [address] [place] [address] [address], [address], [place]  [address]    Patient: Arik August   : 1950  Diagnosis/ICD-10 Code:  Impaired functional mobility, balance, gait, and endurance [Z74.09]  Referring practitioner: ANTONIA Tapia  Date of Initial Evaluation:  Type: THERAPY  Noted: 2023  Patient seen for 26 sessions      Subjective:   Visit Diagnoses:    ICD-10-CM ICD-9-CM   1. Impaired functional mobility, balance, gait, and endurance  Z74.09 V49.89   2. Weakness of both lower extremities  R29.898 729.89   3. Vestibular hypofunction of both ears  H83.2X3 386.50   4. Unsteadiness on feet  R26.81 781.2   5. Difficulty walking  R26.2 719.7         Arik August reports his main concern is being able to walk. He notes sporadic dizziness from prescription med which makes walking difficult but inconsistently.   Subjective Questionnaire: PSFS:  =2.2  Clinical Progress: improved  Home Program Compliance: Yes  Treatment has included: therapeutic exercise, neuromuscular re-education, therapeutic activity, and gait training      Subjective       Objective   SLS R 5s  L 3s    Assessment/Plan  Christophe noted today was significantly more challenging than past sessions. He had 1 near fall when stepping onto dynadisc w/LLE, PT min A & pt used cane to avoid GLF. Progress dynamic balance challenges to include eyes closed on carpet (which is very difficult for pt), inc'd lifting challenges.     Plan Goals: STG to be met in 3 wk:  - Pt to report 25% improvement in frequency of dizziness. - MET 40% on 5/10/23  - Pt to tolerate NuStep x10 min for improved functional activity tolerance. - MET  - Pt to be independent with HEP. - MET  LTG to be met in 3 wk:  - Improve DHI to 25% or less impairment. - Progressing 46% on 5/10/23; 46% on 23  - Improve sensory integration as evidenced by MCTSIB of 3/4 in order to walk to the bathroom at night. NOT MET, 2/4 on  5/10/23 (EO on firm & foam, significantly limited w/eyes closed)  - Increase L hip flex to 4-/5 or greater for improved foot clearance when walking. - MET  - Pt to stand on foam surface x1 min with WBOS, no UE support and no LOB in order to walk to communion at [facility].   -Pt will demonstrate at SLS at least 5s R & L for walking without cane.   Recommendations: Continue with recommendations Progress resistance & balance challenges, low light/low vision challenges  Timeframe: 2 months  Prognosis to achieve goals: good      Timed:         Manual Therapy:         mins  10298;     Therapeutic Exercise:    10     mins  61458;     Neuromuscular Jessica:    20    mins  21096;    Therapeutic Activity:     15     mins  06023;     Gait Training:           mins  82506;     Ultrasound:          mins  50795;    Ionto                                   mins  28735  Self Care                            mins  56473  Canalith Repos         mins  35665  Tests & Measures         10      mins  76454     Un-Timed:  Electrical Stimulation:         mins  59413 (MC );  Dry Needling          mins self-pay  Traction          mins 40415      Timed Treatment:   55   mins   Total Treatment:     55   mins    PT Signature: Gabriela Lovelace, PT, DPT, cert. DN  [place] License: [id number]

## 2023-08-22 ENCOUNTER — TREATMENT (OUTPATIENT)
Dept: PHYSICAL THERAPY | Facility: CLINIC | Age: 73
End: 2023-08-22
Payer: MEDICARE

## 2023-08-22 DIAGNOSIS — R26.81 UNSTEADINESS ON FEET: ICD-10-CM

## 2023-08-22 DIAGNOSIS — H83.2X3 VESTIBULAR HYPOFUNCTION OF BOTH EARS: ICD-10-CM

## 2023-08-22 DIAGNOSIS — R42 VERTIGO: ICD-10-CM

## 2023-08-22 DIAGNOSIS — R26.2 DIFFICULTY WALKING: ICD-10-CM

## 2023-08-22 DIAGNOSIS — Z74.09 IMPAIRED FUNCTIONAL MOBILITY, BALANCE, GAIT, AND ENDURANCE: Primary | ICD-10-CM

## 2023-08-22 DIAGNOSIS — R29.898 WEAKNESS OF BOTH LOWER EXTREMITIES: ICD-10-CM

## 2023-08-22 NOTE — PROGRESS NOTES
Physical Therapy Daily Treatment Note      Patient: Arik August   : 1950  Diagnosis/ICD-10 Code:  Impaired functional mobility, balance, gait, and endurance [Z74.09]  Referring practitioner: ANTONIA Tapia  Date of Initial Visit: Type: THERAPY  Noted: 2023  Today's Date: 2023  Patient seen for 27 sessions         Arik August reports: he is very tired today and very sore and states his balance has seemed worse over the weekend and yesterday. Pt. States it has been a bit discouraging but he keeps doing his therapy and cardiac rehab trying to get better.    Objective   See Exercise, Manual, and Modality Logs for complete treatment.     Assessment/Plan  Pt. Tolerates treatment well this visit despite tiredness and soreness Pt. Completes exercises reporting stretches were significant today. Pt. completes balance activities without loss of balance but fatigue is noted throughout session  and seated rest breaks are given intermittently. Pt. Shows good progress with form on dead lifts this date.    Plan Goals: STG to be met in 3 wk:  - Pt to report 25% improvement in frequency of dizziness. - MET 40% on 5/10/23  - Pt to tolerate NuStep x10 min for improved functional activity tolerance. - MET  - Pt to be independent with HEP. - MET  LTG to be met in 3 wk:  - Improve DHI to 25% or less impairment. - Progressing 46% on 5/10/23; 46% on 23  - Improve sensory integration as evidenced by MCTSIB of 3/4 in order to walk to the bathroom at night. NOT MET, 2/4 on 5/10/23 (EO on firm & foam, significantly limited w/eyes closed)  - Increase L hip flex to 4-/5 or greater for improved foot clearance when walking. - MET  - Pt to stand on foam surface x1 min with WBOS, no UE support and no LOB in order to walk to communion at [employer].   -Pt will demonstrate at SLS at least 5s R & L for walking without cane.         Recommendations: Continue with recommendations Progress resistance & balance challenges,  low light/low vision challenges  Timeframe: 2 months  Prognosis to achieve goals: good    Progress strengthening /stabilization /functional activity           Timed:         Manual Therapy:    10     mins  04241;     Therapeutic Exercise:    15     mins  34739;     Neuromuscular Jessica:    20    mins  91403;    Therapeutic Activity:     15     mins  68797;         Timed Treatment:   60   mins   Total Treatment:     60   mins    Nicole Monique PTA  Physical Therapist Assistant License #[id number]

## 2023-08-24 ENCOUNTER — TREATMENT (OUTPATIENT)
Dept: PHYSICAL THERAPY | Facility: CLINIC | Age: 73
End: 2023-08-24
Payer: MEDICARE

## 2023-08-24 DIAGNOSIS — H83.2X3 VESTIBULAR HYPOFUNCTION OF BOTH EARS: ICD-10-CM

## 2023-08-24 DIAGNOSIS — Z74.09 IMPAIRED FUNCTIONAL MOBILITY, BALANCE, GAIT, AND ENDURANCE: Primary | ICD-10-CM

## 2023-08-24 DIAGNOSIS — R26.2 DIFFICULTY WALKING: ICD-10-CM

## 2023-08-24 DIAGNOSIS — R29.898 WEAKNESS OF BOTH LOWER EXTREMITIES: ICD-10-CM

## 2023-08-24 DIAGNOSIS — R26.81 UNSTEADINESS ON FEET: ICD-10-CM

## 2023-08-24 NOTE — PROGRESS NOTES
Physical Therapy Daily Treatment Note  [address], [address], [place]  [address]    Patient: Arik August   : 1950  Diagnosis/ICD-10 Code:  Impaired functional mobility, balance, gait, and endurance [Z74.09]   Problems Addressed this Visit    None  Visit Diagnoses       Impaired functional mobility, balance, gait, and endurance    -  Primary    Weakness of both lower extremities        Vestibular hypofunction of both ears        Unsteadiness on feet        Difficulty walking              Diagnoses         Codes Comments    Impaired functional mobility, balance, gait, and endurance    -  Primary ICD-10-CM: Z74.09  ICD-9-CM: V49.89     Weakness of both lower extremities     ICD-10-CM: R29.898  ICD-9-CM: 729.89     Vestibular hypofunction of both ears     ICD-10-CM: H83.2X3  ICD-9-CM: 386.50     Unsteadiness on feet     ICD-10-CM: R26.81  ICD-9-CM: 781.2     Difficulty walking     ICD-10-CM: R26.2  ICD-9-CM: 719.7           Referring practitioner: ANTONIA Tapia  Date of Initial Visit: Type: THERAPY  Noted: 2023  Today's Date: 2023    VISIT#: 28    Subjective   Christophe reports his back has been sore the last few days, is unsure why. The progressed PT challenges aren't painful. He is encouraged by working on more strengthening and dynamic balance, though remarks he thought he'd do better than that on the obstacle course.     Objective     See Exercise, Manual, and Modality Logs for complete treatment.     Gait belt donned throughout today's session, SBA/CGA during balance challenges.     Assessment/Plan  Christophe cont to have difficulty with obstacle course, though required no assistance from PT to avoid fall. He had several minor/mod LOB and predominately utilized stepping strategy to correct. Will cont to challenge static & dynamic balance.            Timed:         Manual Therapy:         mins  16296;     Therapeutic Exercise:    15     mins  68846;     Neuromuscular Jessica:    30    mins   76577;    Therapeutic Activity:     15     mins  48062;     Gait Training:           mins  52372;     Ultrasound:          mins  44120;    Ionto                                   mins   06585  Self Care                            mins   77904  Canalith Repos                   mins  62182  Tests & Measures              mins   92782      Un-Timed:  Electrical Stimulation:         mins  03270 ( );  Dry Needling          mins 78095/30969  Traction          mins 17566  Low Eval          Mins  03184  Mod Eval          Mins  18906  High Eval                            Mins  26414  Re-Eval                               mins  20552    Timed Treatment:   60   mins   Total Treatment:     60   mins    Gabriela Lovelace, PT, DPT, cert. DN  Physical Therapist  [place] Lic # [id number]

## 2023-08-28 ENCOUNTER — CLINICAL SUPPORT (OUTPATIENT)
Dept: FAMILY MEDICINE CLINIC | Facility: CLINIC | Age: 73
End: 2023-08-28
Payer: MEDICARE

## 2023-08-28 DIAGNOSIS — E34.9 TESTOSTERONE DEFICIENCY: Primary | ICD-10-CM

## 2023-08-28 PROCEDURE — 96372 THER/PROPH/DIAG INJ SC/IM: CPT | Performed by: NURSE PRACTITIONER

## 2023-08-28 RX ADMIN — TESTOSTERONE CYPIONATE 200 MG: 200 INJECTION, SOLUTION INTRAMUSCULAR at 09:50

## 2023-08-31 ENCOUNTER — TREATMENT (OUTPATIENT)
Dept: PHYSICAL THERAPY | Facility: CLINIC | Age: 73
End: 2023-08-31
Payer: MEDICARE

## 2023-08-31 DIAGNOSIS — R26.81 UNSTEADINESS ON FEET: ICD-10-CM

## 2023-08-31 DIAGNOSIS — H83.2X3 VESTIBULAR HYPOFUNCTION OF BOTH EARS: ICD-10-CM

## 2023-08-31 DIAGNOSIS — R29.898 WEAKNESS OF BOTH LOWER EXTREMITIES: ICD-10-CM

## 2023-08-31 DIAGNOSIS — Z74.09 IMPAIRED FUNCTIONAL MOBILITY, BALANCE, GAIT, AND ENDURANCE: Primary | ICD-10-CM

## 2023-08-31 DIAGNOSIS — R42 VERTIGO: ICD-10-CM

## 2023-08-31 DIAGNOSIS — R26.2 DIFFICULTY WALKING: ICD-10-CM

## 2023-08-31 NOTE — PROGRESS NOTES
Physical Therapy Daily Treatment Note      Patient: Arik August   : 1950  Diagnosis/ICD-10 Code:  Impaired functional mobility, balance, gait, and endurance [Z74.09]  Referring practitioner: ANTONIA Tapia  Date of Initial Visit: Type: THERAPY  Noted: 2023  Today's Date: 2023  Patient seen for 29 sessions         Arik August reports: he continues to feel rough being on his medication but states his MD is taking him off of it but it will take a month to get it out of his system. Pt. States this morning his back is hurting bad today.    DHI = 58    Objective   See Exercise, Manual, and Modality Logs for complete treatment.     Assessment/Plan  Pt. Is tolerating treatment well today and shows good response to perturbations this date. Pt. Has no complaints of pain throughout but it is apparent during transitions this date pt. Is guarded due to back pain.    Plan Goals: STG to be met in 3 wk:  - Pt to report 25% improvement in frequency of dizziness. - MET 40% on 5/10/23  - Pt to tolerate NuStep x10 min for improved functional activity tolerance. - MET  - Pt to be independent with HEP. - MET  LTG to be met in 3 wk:  - Improve DHI to 25% or less impairment. - Progressing 46% on 5/10/23; 46% on 23  - Improve sensory integration as evidenced by MCTSIB of 3/4 in order to walk to the bathroom at night. NOT MET, 2/4 on 5/10/23 (EO on firm & foam, significantly limited w/eyes closed)  - Increase L hip flex to 4-/5 or greater for improved foot clearance when walking. - MET  - Pt to stand on foam surface x1 min with WBOS, no UE support and no LOB in order to walk to communion at [employer].   -Pt will demonstrate at SLS at least 5s R & L for walking without cane.         Recommendations: Continue with recommendations Progress resistance & balance challenges, low light/low vision challenges    Progress strengthening /stabilization /functional activity           Timed:         Manual Therapy:   10       mins  08047;     Therapeutic Exercise:    15     mins  14585;     Neuromuscular Jessica:    15    mins  00780;    Therapeutic Activity:     15     mins  26463;         Timed Treatment:   55   mins   Total Treatment:     55   mins    Nicole Monique PTA  Physical Therapist Assistant License #[id number]

## 2023-09-07 ENCOUNTER — TREATMENT (OUTPATIENT)
Dept: PHYSICAL THERAPY | Facility: CLINIC | Age: 73
End: 2023-09-07
Payer: MEDICARE

## 2023-09-07 DIAGNOSIS — R42 VERTIGO: ICD-10-CM

## 2023-09-07 DIAGNOSIS — R29.898 WEAKNESS OF BOTH LOWER EXTREMITIES: ICD-10-CM

## 2023-09-07 DIAGNOSIS — Z74.09 IMPAIRED FUNCTIONAL MOBILITY, BALANCE, GAIT, AND ENDURANCE: Primary | ICD-10-CM

## 2023-09-07 DIAGNOSIS — H83.2X3 VESTIBULAR HYPOFUNCTION OF BOTH EARS: ICD-10-CM

## 2023-09-07 DIAGNOSIS — R26.2 DIFFICULTY WALKING: ICD-10-CM

## 2023-09-07 DIAGNOSIS — R26.81 UNSTEADINESS ON FEET: ICD-10-CM

## 2023-09-07 NOTE — PROGRESS NOTES
Physical Therapy Daily Treatment Note      Patient: Arik August   : 1950  Diagnosis/ICD-10 Code:  Impaired functional mobility, balance, gait, and endurance [Z74.09]  Referring practitioner: ANTONIA Tapia  Date of Initial Visit: Type: THERAPY  Noted: 2023  Today's Date: 2023  Patient seen for 30 sessions         Arik August reports: he is feeling off today and he has had a couple days where he has just felt in a funk and his blood pressure has fluctuated stating he had low blood pressure for a couple of days. Pt. States he would like his BP taken today before exercise just to settle his mind.     Objective   See Exercise, Manual, and Modality Logs for complete treatment.     /71 mmHg   Resting HR 71 BPM    Assessment/Plan  Pt. Tolerates treatment well today despite feeling off as exercise was completed Pt. seemed to improve a bit and his BP and HR remained the same throughout session. Pt. did well with balance challenges as well and had some weakness in L LE with weightbearing but did well overall.    Plan Goals: STG to be met in 3 wk:  - Pt to report 25% improvement in frequency of dizziness. - MET 40% on 5/10/23  - Pt to tolerate NuStep x10 min for improved functional activity tolerance. - MET  - Pt to be independent with HEP. - MET  LTG to be met in 3 wk:  - Improve DHI to 25% or less impairment. - Progressing 46% on 5/10/23; 46% on 23  - Improve sensory integration as evidenced by MCTSIB of 3/4 in order to walk to the bathroom at night. NOT MET, 2/4 on 5/10/23 (EO on firm & foam, significantly limited w/eyes closed)  - Increase L hip flex to 4-/5 or greater for improved foot clearance when walking. - MET  - Pt to stand on foam surface x1 min with WBOS, no UE support and no LOB in order to walk to communion at [employer].   -Pt will demonstrate at SLS at least 5s R & L for walking without cane.         Recommendations: Continue with recommendations Progress resistance &  balance challenges, low light/low vision challenges    Progress strengthening /stabilization /functional activity           Timed:         Manual Therapy:   10      mins  35721;     Therapeutic Exercise:    15     mins  74415;     Neuromuscular Jessica:    15    mins  68206;    Therapeutic Activity:     15     mins  41035;       Timed Treatment:   55   mins   Total Treatment:     55   mins    Nicole Monique PTA  Physical Therapist Assistant License #[id number]

## 2023-09-11 ENCOUNTER — CLINICAL SUPPORT (OUTPATIENT)
Dept: FAMILY MEDICINE CLINIC | Facility: CLINIC | Age: 73
End: 2023-09-11
Payer: MEDICARE

## 2023-09-11 DIAGNOSIS — E34.9 TESTOSTERONE DEFICIENCY: Primary | ICD-10-CM

## 2023-09-11 PROCEDURE — 96372 THER/PROPH/DIAG INJ SC/IM: CPT | Performed by: NURSE PRACTITIONER

## 2023-09-11 RX ORDER — BUMETANIDE 2 MG/1
TABLET ORAL
Qty: 180 TABLET | Refills: 0 | Status: SHIPPED | OUTPATIENT
Start: 2023-09-11

## 2023-09-11 RX ADMIN — TESTOSTERONE CYPIONATE 200 MG: 200 INJECTION, SOLUTION INTRAMUSCULAR at 10:09

## 2023-09-12 ENCOUNTER — TREATMENT (OUTPATIENT)
Dept: PHYSICAL THERAPY | Facility: CLINIC | Age: 73
End: 2023-09-12
Payer: MEDICARE

## 2023-09-12 DIAGNOSIS — R42 VERTIGO: ICD-10-CM

## 2023-09-12 DIAGNOSIS — R26.81 UNSTEADINESS ON FEET: ICD-10-CM

## 2023-09-12 DIAGNOSIS — Z74.09 IMPAIRED FUNCTIONAL MOBILITY, BALANCE, GAIT, AND ENDURANCE: Primary | ICD-10-CM

## 2023-09-12 DIAGNOSIS — H83.2X3 VESTIBULAR HYPOFUNCTION OF BOTH EARS: ICD-10-CM

## 2023-09-12 DIAGNOSIS — R26.2 DIFFICULTY WALKING: ICD-10-CM

## 2023-09-12 DIAGNOSIS — R29.898 WEAKNESS OF BOTH LOWER EXTREMITIES: ICD-10-CM

## 2023-09-12 NOTE — PROGRESS NOTES
"     Physical Therapy Daily Treatment Note      Patient: Arik August   : 1950  Diagnosis/ICD-10 Code:  Impaired functional mobility, balance, gait, and endurance [Z74.09]  Referring practitioner: ANTONIA Tapia  Date of Initial Visit: Type: THERAPY  Noted: 2023  Today's Date: 2023  Patient seen for 31 sessions         Arik August reports: he is doing better regarding his BP today and doesn't have the \"weird\" feeling he had last session but states his back and neck are significantly more sore due to sitting on bleachers to view a Volleyball game.     Objective   See Exercise, Manual, and Modality Logs for complete treatment.     Assessment/Plan  Pt. Has wonderful performance in therapy this date exhibiting improved stepping reaction during perturbations and during static balance. Pt. Will continue to benefit form strengthening and stretching to improve overall mobility and QOL.    Progress strengthening /stabilization /functional activity           Timed:         Manual Therapy:    10     mins  97737;     Therapeutic Exercise:    15     mins  55356;     Neuromuscular Jessica:    15    mins  97558;        Timed Treatment:   40   mins   Total Treatment:     40   mins    Nicole Monique PTA  Physical Therapist Assistant License #[id number]      "

## 2023-09-19 ENCOUNTER — TREATMENT (OUTPATIENT)
Dept: PHYSICAL THERAPY | Facility: CLINIC | Age: 73
End: 2023-09-19
Payer: MEDICARE

## 2023-09-19 DIAGNOSIS — R26.2 DIFFICULTY WALKING: ICD-10-CM

## 2023-09-19 DIAGNOSIS — R29.898 WEAKNESS OF BOTH LOWER EXTREMITIES: ICD-10-CM

## 2023-09-19 DIAGNOSIS — R26.81 UNSTEADINESS ON FEET: ICD-10-CM

## 2023-09-19 DIAGNOSIS — H83.2X3 VESTIBULAR HYPOFUNCTION OF BOTH EARS: ICD-10-CM

## 2023-09-19 DIAGNOSIS — Z74.09 IMPAIRED FUNCTIONAL MOBILITY, BALANCE, GAIT, AND ENDURANCE: Primary | ICD-10-CM

## 2023-09-19 DIAGNOSIS — R42 VERTIGO: ICD-10-CM

## 2023-09-19 NOTE — PROGRESS NOTES
Physical Therapy Daily Treatment Note  [address], [address], [place]  [address]    Patient: Arik August   : 1950  Diagnosis/ICD-10 Code:  Impaired functional mobility, balance, gait, and endurance [Z74.09]   Problems Addressed this Visit    None  Visit Diagnoses       Impaired functional mobility, balance, gait, and endurance    -  Primary    Weakness of both lower extremities        Vestibular hypofunction of both ears        Unsteadiness on feet        Vertigo        Difficulty walking              Diagnoses         Codes Comments    Impaired functional mobility, balance, gait, and endurance    -  Primary ICD-10-CM: Z74.09  ICD-9-CM: V49.89     Weakness of both lower extremities     ICD-10-CM: R29.898  ICD-9-CM: 729.89     Vestibular hypofunction of both ears     ICD-10-CM: H83.2X3  ICD-9-CM: 386.50     Unsteadiness on feet     ICD-10-CM: R26.81  ICD-9-CM: 781.2     Vertigo     ICD-10-CM: R42  ICD-9-CM: 780.4     Difficulty walking     ICD-10-CM: R26.2  ICD-9-CM: 719.7           Referring practitioner: ANTONIA Tapia  Date of Initial Visit: Type: THERAPY  Noted: 2023  Today's Date: 2023    VISIT#: 32    Subjective   Christophe reports he has been doing cardiac rehab 3 days a week, PT here in [place] for balance, and is about to start massage therapy. He notes he has been progressing in strength, able to tolerate resistance 6 on Nustep bike now. However, his dizziness persists and he cont to have to use the cane. He notes he has tried to bring his ongoing dizziness and imbalance to the attention of his cardiologist and team, though it seems to fall on deaf ears.     Objective     See Exercise, Manual, and Modality Logs for complete treatment.     Intermittent UE touch on TM arm bar during NMR. He had no significant LOB during dynamic gait, though did utilize SPC.   Gait belt donned for NMR    Assessment/Plan  Christophe cont to exhibit static & dynamic imbalance, though seems to require less UE  support throughout compared to previous sessions with this PT. Christophe has 2 appts on schedule, PT advised him he was nearing CMS threshold and scheduling more would be at his discretion. PROGRESS NOTE NEXT SESSION  Progress strengthening /stabilization /functional activity         Timed:         Manual Therapy:         mins  93303;     Therapeutic Exercise:    15     mins  29479;     Neuromuscular Jessica:    40    mins  42779;    Therapeutic Activity:          mins  82877;     Gait Training:           mins  32551;     Ultrasound:          mins  56076;    Ionto                                   mins   88696  Self Care                            mins   87515  Canalith Repos                   mins  60691  Tests & Measures              mins   02034      Un-Timed:  Electrical Stimulation:         mins  32723 ( );  Dry Needling          mins 36597/26685  Traction          mins 29736  Low Eval          Mins  62614  Mod Eval          Mins  23220  High Eval                            Mins  35194  Re-Eval                               mins  59148    Timed Treatment:   55   mins   Total Treatment:     55   mins    Gabriela Lovelace, PT, DPT, cert. DN  Physical Therapist  [place] Lic # [id number]

## 2023-09-25 ENCOUNTER — CLINICAL SUPPORT (OUTPATIENT)
Dept: FAMILY MEDICINE CLINIC | Facility: CLINIC | Age: 73
End: 2023-09-25

## 2023-09-25 DIAGNOSIS — E34.9 TESTOSTERONE DEFICIENCY: Primary | ICD-10-CM

## 2023-09-25 RX ADMIN — TESTOSTERONE CYPIONATE 200 MG: 200 INJECTION, SOLUTION INTRAMUSCULAR at 09:44

## 2023-09-28 ENCOUNTER — OFFICE VISIT (OUTPATIENT)
Dept: FAMILY MEDICINE CLINIC | Facility: CLINIC | Age: 73
End: 2023-09-28
Payer: MEDICARE

## 2023-09-28 VITALS
RESPIRATION RATE: 15 BRPM | WEIGHT: 209 LBS | SYSTOLIC BLOOD PRESSURE: 134 MMHG | HEIGHT: 72 IN | OXYGEN SATURATION: 96 % | BODY MASS INDEX: 28.31 KG/M2 | DIASTOLIC BLOOD PRESSURE: 76 MMHG | TEMPERATURE: 97.5 F | HEART RATE: 64 BPM

## 2023-09-28 DIAGNOSIS — E87.1 HYPONATREMIA: ICD-10-CM

## 2023-09-28 DIAGNOSIS — I25.5 ISCHEMIC CARDIOMYOPATHY: Primary | ICD-10-CM

## 2023-09-28 DIAGNOSIS — E03.9 ACQUIRED HYPOTHYROIDISM: ICD-10-CM

## 2023-09-28 DIAGNOSIS — R29.898 WEAKNESS OF BOTH LEGS: ICD-10-CM

## 2023-09-28 DIAGNOSIS — N52.9 ERECTILE DYSFUNCTION, UNSPECIFIED ERECTILE DYSFUNCTION TYPE: ICD-10-CM

## 2023-09-28 DIAGNOSIS — Z23 NEED FOR IMMUNIZATION AGAINST INFLUENZA: ICD-10-CM

## 2023-09-28 PROCEDURE — 1159F MED LIST DOCD IN RCRD: CPT | Performed by: NURSE PRACTITIONER

## 2023-09-28 PROCEDURE — 90662 IIV NO PRSV INCREASED AG IM: CPT | Performed by: NURSE PRACTITIONER

## 2023-09-28 PROCEDURE — 3075F SYST BP GE 130 - 139MM HG: CPT | Performed by: NURSE PRACTITIONER

## 2023-09-28 PROCEDURE — 1160F RVW MEDS BY RX/DR IN RCRD: CPT | Performed by: NURSE PRACTITIONER

## 2023-09-28 PROCEDURE — 3078F DIAST BP <80 MM HG: CPT | Performed by: NURSE PRACTITIONER

## 2023-09-28 PROCEDURE — G0008 ADMIN INFLUENZA VIRUS VAC: HCPCS | Performed by: NURSE PRACTITIONER

## 2023-09-28 PROCEDURE — 99214 OFFICE O/P EST MOD 30 MIN: CPT | Performed by: NURSE PRACTITIONER

## 2023-09-28 PROCEDURE — 3044F HG A1C LEVEL LT 7.0%: CPT | Performed by: NURSE PRACTITIONER

## 2023-09-28 RX ORDER — TADALAFIL 10 MG/1
10 TABLET ORAL DAILY PRN
Qty: 90 TABLET | Refills: 0 | Status: SHIPPED | OUTPATIENT
Start: 2023-09-28

## 2023-09-28 RX ORDER — POTASSIUM CHLORIDE 750 MG/1
20 TABLET, FILM COATED, EXTENDED RELEASE ORAL DAILY
Qty: 90 TABLET | Refills: 1
Start: 2023-09-28

## 2023-09-28 RX ORDER — CLOPIDOGREL BISULFATE 75 MG
1 TABLET ORAL DAILY
COMMUNITY
Start: 2023-08-30

## 2023-09-28 RX ORDER — METOPROLOL SUCCINATE 25 MG/1
50 TABLET, EXTENDED RELEASE ORAL DAILY
Qty: 90 TABLET | Refills: 1
Start: 2023-09-28

## 2023-09-28 NOTE — PROGRESS NOTES
"Chief Complaint  Hypertension and Hypothyroidism  Subjective        Arik August presents to [facility]  History of Present Illness  Pt comes in today for routine follow up.  Had interventional cath done on 6/13 with PCI x2.   Has been going to PT 5 days/week. 3 days of cardiac rehab and 2 days for vertigo.   Has been going to 2 different places.  Feels like he is getting worse. More weak.   Swelling and redness in lower legs. Bumex is up to 2mg bid.   Spoke to Dr. Green and he is having some testing done on Monday and then follow up with him on Wednesday.   Uses cane.   Has had lumbar spine surgery. Sees Dr. Zendejsa and will see him on Wednesday as well. Rheumatologist worried about poss pinched nerve.   Has RA.  Getting ready to start massage therapy as well.      Objective     Vital Signs:   /76   Pulse 64   Temp 97.5 °F (36.4 °C)   Resp 15   Ht 182.9 cm (72\")   Wt 94.8 kg (209 lb)   SpO2 96%   BMI 28.35 kg/m²       BP Readings from Last 3 Encounters:   09/28/23 134/76   06/21/23 122/60   03/30/23 138/68       Wt Readings from Last 3 Encounters:   09/28/23 94.8 kg (209 lb)   06/21/23 92.5 kg (204 lb)   03/30/23 99.8 kg (220 lb)     Physical Exam  Constitutional:       Appearance: He is well-developed.   Eyes:      Pupils: Pupils are equal, round, and reactive to light.   Cardiovascular:      Rate and Rhythm: Normal rate and regular rhythm.   Pulmonary:      Effort: Pulmonary effort is normal.      Breath sounds: Normal breath sounds.   Musculoskeletal:      Right lower leg: Edema present.      Left lower leg: Edema present.   Neurological:      Mental Status: He is alert and oriented to person, place, and time.      Result Review :                 Assessment and Plan    Diagnoses and all orders for this visit:    1. Ischemic cardiomyopathy (Primary)    2. Need for immunization against influenza  -     Fluzone High-Dose 65+yrs ([id number])    3. Acquired " hypothyroidism  -     TSH; Future    4. Hyponatremia  -     Comprehensive Metabolic Panel; Future    5. Weakness of both legs    6. Erectile dysfunction, unspecified erectile dysfunction type    Other orders  -     metoprolol succinate XL (TOPROL-XL) 25 MG 24 hr tablet; Take 2 tablets by mouth Daily.  Dispense: 90 tablet; Refill: 1  -     potassium chloride (K-DUR,KLOR-CON) 10 MEQ ER tablet; Take 2 tablets by mouth Daily.  Dispense: 90 tablet; Refill: 1  -     tadalafil (Cialis) 10 MG tablet; Take 1 tablet by mouth Daily As Needed for Erectile Dysfunction.  Dispense: 90 tablet; Refill: 0    Flu vaccine  Check labs. Pt is going to have these done at [facility] with upcoming labs  Follow up with cardiology and neurosurgery as scheduled next week  During this office visit, we discussed the pertinent aspects of the visit and treatment recommendations. Pt verbalizes understanding. Follow up was discussed. Patient was given the opportunity to ask questions and discuss other concerns.         Follow Up   Return in about 6 months (around 3/28/2024), or if symptoms worsen or fail to improve, for Medicare Wellness.  Patient was given instructions and counseling regarding his condition or for health maintenance advice. Please see specific information pulled into the AVS if appropriate.

## 2023-10-06 ENCOUNTER — TELEPHONE (OUTPATIENT)
Dept: FAMILY MEDICINE CLINIC | Facility: CLINIC | Age: 73
End: 2023-10-06
Payer: MEDICARE

## 2023-10-06 RX ORDER — LEVOTHYROXINE SODIUM 0.1 MG/1
100 TABLET ORAL DAILY
Qty: 90 TABLET | Refills: 0 | Status: SHIPPED | OUTPATIENT
Start: 2023-10-06

## 2023-10-09 ENCOUNTER — CLINICAL SUPPORT (OUTPATIENT)
Dept: FAMILY MEDICINE CLINIC | Facility: CLINIC | Age: 73
End: 2023-10-09
Payer: MEDICARE

## 2023-10-09 DIAGNOSIS — E29.1 DEFICIENCY OF TESTOSTERONE BIOSYNTHESIS: Primary | ICD-10-CM

## 2023-10-09 PROCEDURE — 96372 THER/PROPH/DIAG INJ SC/IM: CPT | Performed by: NURSE PRACTITIONER

## 2023-10-09 RX ADMIN — TESTOSTERONE CYPIONATE 200 MG: 200 INJECTION, SOLUTION INTRAMUSCULAR at 10:27

## 2023-10-17 ENCOUNTER — DOCUMENTATION (OUTPATIENT)
Dept: PHYSICAL THERAPY | Facility: CLINIC | Age: 73
End: 2023-10-17
Payer: MEDICARE

## 2023-10-17 DIAGNOSIS — R29.898 WEAKNESS OF BOTH LOWER EXTREMITIES: ICD-10-CM

## 2023-10-17 DIAGNOSIS — Z74.09 IMPAIRED FUNCTIONAL MOBILITY, BALANCE, GAIT, AND ENDURANCE: Primary | ICD-10-CM

## 2023-10-17 DIAGNOSIS — H83.2X3 VESTIBULAR HYPOFUNCTION OF BOTH EARS: ICD-10-CM

## 2023-10-17 DIAGNOSIS — R26.81 UNSTEADINESS ON FEET: ICD-10-CM

## 2023-10-17 DIAGNOSIS — R26.2 DIFFICULTY WALKING: ICD-10-CM

## 2023-10-17 NOTE — PROGRESS NOTES
Discharge Summary  Discharge Summary from Physical Therapy Report                               [address], [address], [place]  [address]    Dates  PT visit: 4/5/2023-9/19/2023     VISIT#: 32      Discharge Status of Patient: See Progress Note dated 8/17/23    Goals: Partially Met  Plan Goals: STG to be met in 3 wk:  - Pt to report 25% improvement in frequency of dizziness. - MET 40% on 5/10/23  - Pt to tolerate NuStep x10 min for improved functional activity tolerance. - MET  - Pt to be independent with HEP. - MET  LTG to be met in 3 wk:  - Improve DHI to 25% or less impairment. - Progressing 46% on 5/10/23; 46% on 6/29/23  - Improve sensory integration as evidenced by MCTSIB of 3/4 in order to walk to the bathroom at night. NOT MET, 2/4 on 5/10/23 (EO on firm & foam, significantly limited w/eyes closed)  - Increase L hip flex to 4-/5 or greater for improved foot clearance when walking. - MET  - Pt to stand on foam surface x1 min with WBOS, no UE support and no LOB in order to walk to communion at [employer].   -Pt will demonstrate at SLS at least 5s R & L for walking without cane  Discharge Plan: Continue with current home exercise program as instructed    Comments Pt is not doing well, he'd like to DC at this time.     Date of Discharge 10/17/23        Gabriela Lovelace, PT, DPT, cert. DN  Physical Therapist  [place] Lic# [id number]

## 2023-10-23 ENCOUNTER — CLINICAL SUPPORT (OUTPATIENT)
Dept: FAMILY MEDICINE CLINIC | Facility: CLINIC | Age: 73
End: 2023-10-23
Payer: MEDICARE

## 2023-10-23 DIAGNOSIS — E34.9 TESTOSTERONE DEFICIENCY: Primary | ICD-10-CM

## 2023-10-23 PROCEDURE — 96372 THER/PROPH/DIAG INJ SC/IM: CPT | Performed by: NURSE PRACTITIONER

## 2023-10-23 RX ADMIN — TESTOSTERONE CYPIONATE 200 MG: 200 INJECTION, SOLUTION INTRAMUSCULAR at 10:01

## 2023-10-29 NOTE — TELEPHONE ENCOUNTER
Patient would like a call  When the testosterone serum is in.    Patient call back [phone]   Perfect[employer] message to Dr. Anni Ferrara with new BMP results.

## 2023-10-31 RX ORDER — MAGNESIUM OXIDE TAB 400 MG (241.3 MG ELEMENTAL MG) 400 (241.3 MG) MG
1 TAB ORAL DAILY
Qty: 90 TABLET | Refills: 1 | Status: SHIPPED | OUTPATIENT
Start: 2023-10-31

## 2023-11-02 ENCOUNTER — HOSPITAL ENCOUNTER (OUTPATIENT)
Dept: CARDIOLOGY | Facility: HOSPITAL | Age: 73
Discharge: HOME | End: 2023-11-02
Payer: MEDICARE

## 2023-11-02 ENCOUNTER — OFFICE VISIT (OUTPATIENT)
Dept: FAMILY MEDICINE CLINIC | Facility: CLINIC | Age: 73
End: 2023-11-02
Payer: MEDICARE

## 2023-11-02 VITALS
RESPIRATION RATE: 18 BRPM | OXYGEN SATURATION: 96 % | HEIGHT: 72 IN | SYSTOLIC BLOOD PRESSURE: 126 MMHG | BODY MASS INDEX: 28.61 KG/M2 | WEIGHT: 211.2 LBS | HEART RATE: 90 BPM | DIASTOLIC BLOOD PRESSURE: 86 MMHG

## 2023-11-02 DIAGNOSIS — L03.116 CELLULITIS OF LEFT LEG: ICD-10-CM

## 2023-11-02 DIAGNOSIS — M79.605 LEFT LEG PAIN: ICD-10-CM

## 2023-11-02 DIAGNOSIS — R60.0 EDEMA OF LEFT LOWER LEG: ICD-10-CM

## 2023-11-02 DIAGNOSIS — L03.116 CELLULITIS OF LEFT LEG: Primary | ICD-10-CM

## 2023-11-02 LAB
BH CV LOWER VASCULAR LEFT COMMON FEMORAL AUGMENT: NORMAL
BH CV LOWER VASCULAR LEFT COMMON FEMORAL COMPETENT: NORMAL
BH CV LOWER VASCULAR LEFT COMMON FEMORAL COMPRESS: NORMAL
BH CV LOWER VASCULAR LEFT COMMON FEMORAL PHASIC: NORMAL
BH CV LOWER VASCULAR LEFT COMMON FEMORAL SPONT: NORMAL
BH CV LOWER VASCULAR LEFT DISTAL FEMORAL COMPRESS: NORMAL
BH CV LOWER VASCULAR LEFT GASTRONEMIUS COMPRESS: NORMAL
BH CV LOWER VASCULAR LEFT GREATER SAPH AK COMPRESS: NORMAL
BH CV LOWER VASCULAR LEFT GREATER SAPH BK COMPRESS: NORMAL
BH CV LOWER VASCULAR LEFT LESSER SAPH COMPRESS: NORMAL
BH CV LOWER VASCULAR LEFT MID FEMORAL AUGMENT: NORMAL
BH CV LOWER VASCULAR LEFT MID FEMORAL COMPETENT: NORMAL
BH CV LOWER VASCULAR LEFT MID FEMORAL COMPRESS: NORMAL
BH CV LOWER VASCULAR LEFT MID FEMORAL PHASIC: NORMAL
BH CV LOWER VASCULAR LEFT MID FEMORAL SPONT: NORMAL
BH CV LOWER VASCULAR LEFT PERONEAL COMPRESS: NORMAL
BH CV LOWER VASCULAR LEFT POPLITEAL AUGMENT: NORMAL
BH CV LOWER VASCULAR LEFT POPLITEAL COMPETENT: NORMAL
BH CV LOWER VASCULAR LEFT POPLITEAL COMPRESS: NORMAL
BH CV LOWER VASCULAR LEFT POPLITEAL PHASIC: NORMAL
BH CV LOWER VASCULAR LEFT POPLITEAL SPONT: NORMAL
BH CV LOWER VASCULAR LEFT POSTERIOR TIBIAL COMPRESS: NORMAL
BH CV LOWER VASCULAR LEFT PROXIMAL FEMORAL COMPRESS: NORMAL
BH CV LOWER VASCULAR LEFT SAPHENOFEMORAL JUNCTION COMPRESS: NORMAL
BH CV LOWER VASCULAR RIGHT COMMON FEMORAL AUGMENT: NORMAL
BH CV LOWER VASCULAR RIGHT COMMON FEMORAL COMPETENT: NORMAL
BH CV LOWER VASCULAR RIGHT COMMON FEMORAL COMPRESS: NORMAL
BH CV LOWER VASCULAR RIGHT COMMON FEMORAL PHASIC: NORMAL
BH CV LOWER VASCULAR RIGHT COMMON FEMORAL SPONT: NORMAL
BH CV VAS PRELIMINARY FINDINGS SCRIPTING: 1

## 2023-11-02 PROCEDURE — 93971 EXTREMITY STUDY: CPT

## 2023-11-02 PROCEDURE — 3074F SYST BP LT 130 MM HG: CPT | Performed by: NURSE PRACTITIONER

## 2023-11-02 PROCEDURE — 3044F HG A1C LEVEL LT 7.0%: CPT | Performed by: NURSE PRACTITIONER

## 2023-11-02 PROCEDURE — 1159F MED LIST DOCD IN RCRD: CPT | Performed by: NURSE PRACTITIONER

## 2023-11-02 PROCEDURE — 3079F DIAST BP 80-89 MM HG: CPT | Performed by: NURSE PRACTITIONER

## 2023-11-02 PROCEDURE — 99214 OFFICE O/P EST MOD 30 MIN: CPT | Performed by: NURSE PRACTITIONER

## 2023-11-02 PROCEDURE — 1160F RVW MEDS BY RX/DR IN RCRD: CPT | Performed by: NURSE PRACTITIONER

## 2023-11-02 RX ORDER — POTASSIUM CHLORIDE 750 MG/1
20 TABLET, FILM COATED, EXTENDED RELEASE ORAL DAILY
Qty: 90 TABLET | Refills: 0 | Status: SHIPPED | OUTPATIENT
Start: 2023-11-02 | End: 2023-11-06 | Stop reason: SDUPTHER

## 2023-11-02 RX ORDER — CEPHALEXIN 500 MG/1
500 CAPSULE ORAL 3 TIMES DAILY
Qty: 30 CAPSULE | Refills: 0 | Status: SHIPPED | OUTPATIENT
Start: 2023-11-02 | End: 2023-11-02 | Stop reason: SDUPTHER

## 2023-11-02 RX ORDER — CEPHALEXIN 500 MG/1
500 CAPSULE ORAL 3 TIMES DAILY
Qty: 30 CAPSULE | Refills: 0 | Status: SHIPPED | OUTPATIENT
Start: 2023-11-02

## 2023-11-02 NOTE — PROGRESS NOTES
"Chief Complaint  Leg Pain (Left leg swelling and redness)  Subjective        Arik August presents to [facility]  History of Present Illness  Pt comes in today with c/o cellulitis in left leg. Went to Urgent care on Monday and was given anbx injection and started on doxycycline bid. Doesn't seem to be getting any better.   Rash, redness, and swelling started on Saturday.   Leg Pain          Objective     Vital Signs:   /86   Pulse 90   Resp 18   Ht 182.9 cm (72\")   Wt 95.8 kg (211 lb 3.2 oz)   SpO2 96%   BMI 28.64 kg/m²       BP Readings from Last 3 Encounters:   11/02/23 126/86   09/28/23 134/76   06/21/23 122/60       Wt Readings from Last 3 Encounters:   11/02/23 95.8 kg (211 lb 3.2 oz)   09/28/23 94.8 kg (209 lb)   06/21/23 92.5 kg (204 lb)     Physical Exam  Constitutional:       Appearance: He is well-developed.   Eyes:      Pupils: Pupils are equal, round, and reactive to light.   Cardiovascular:      Rate and Rhythm: Normal rate and regular rhythm.   Pulmonary:      Effort: Pulmonary effort is normal.      Breath sounds: Normal breath sounds.   Musculoskeletal:      Comments: Left lower leg swollen, red, tender to touch, warm    Neurological:      Mental Status: He is alert and oriented to person, place, and time.        Result Review :                 Assessment and Plan    Diagnoses and all orders for this visit:    1. Cellulitis of left leg (Primary)  -     Duplex Venous Lower Extremity - Left CAR; Future  -     cephalexin (Keflex) 500 MG capsule; Take 1 capsule by mouth 3 (Three) Times a Day.  Dispense: 30 capsule; Refill: 0    2. Edema of left lower leg  -     Duplex Venous Lower Extremity - Left CAR; Future    3. Left leg pain  -     Duplex Venous Lower Extremity - Left CAR; Future    Venous doppler  Add keflex  During this office visit, we discussed the pertinent aspects of the visit and treatment recommendations. Pt verbalizes understanding. Follow up " was discussed. Patient was given the opportunity to ask questions and discuss other concerns.         Follow Up   Return if symptoms worsen or fail to improve.  Patient was given instructions and counseling regarding his condition or for health maintenance advice. Please see specific information pulled into the AVS if appropriate.

## 2023-11-02 NOTE — TELEPHONE ENCOUNTER
"Caller: Arik August \"JOEY\"    Relationship: Self    Best call back number: [phone]     Requested Prescriptions:   Requested Prescriptions     Pending Prescriptions Disp Refills    potassium chloride (K-DUR,KLOR-CON) 10 MEQ ER tablet       Sig: Take 2 tablets by mouth Daily.        Pharmacy where request should be sent: [facility] [id number] [facility], [place] - [address] - [phone]  - [phone] FX     Last office visit with prescribing clinician: 11/2/2023   Last telemedicine visit with prescribing clinician: Visit date not found   Next office visit with prescribing clinician: 12/21/2023     Additional details provided by patient: PATIENT IS OUT OF THIS MEDICATION    Does the patient have less than a 3 day supply:  [x] Yes  [] No    Would you like a call back once the refill request has been completed: [] Yes [x] No    If the office needs to give you a call back, can they leave a voicemail: [] Yes [x] No    Jeimy Dickey Rep   11/02/23 11:57 EDT         "

## 2023-11-02 NOTE — TELEPHONE ENCOUNTER
"    Caller: Arik August \"JOEY\"    Relationship: Self    Best call back number: [phone]     What medication are you requesting: CEPHALEXIN      If a prescription is needed, what is your preferred pharmacy and phone number: [facility] [id number] - [place], [place] - [address] - [phone]  - [phone] FX     Additional notes:  [facility] STATES THEY HAVE NOT RECEIVED THE PRESCRIPTION. PLEASE RESEND PRESCRIPTION        "

## 2023-11-06 ENCOUNTER — CLINICAL SUPPORT (OUTPATIENT)
Dept: FAMILY MEDICINE CLINIC | Facility: CLINIC | Age: 73
End: 2023-11-06
Payer: MEDICARE

## 2023-11-06 ENCOUNTER — TELEPHONE (OUTPATIENT)
Dept: FAMILY MEDICINE CLINIC | Facility: CLINIC | Age: 73
End: 2023-11-06

## 2023-11-06 DIAGNOSIS — E34.9 TESTOSTERONE DEFICIENCY: Primary | ICD-10-CM

## 2023-11-06 RX ORDER — POTASSIUM CHLORIDE 750 MG/1
20 TABLET, FILM COATED, EXTENDED RELEASE ORAL DAILY
Qty: 90 TABLET | Refills: 1 | Status: SHIPPED | OUTPATIENT
Start: 2023-11-06 | End: 2023-11-06 | Stop reason: SDUPTHER

## 2023-11-06 RX ORDER — POTASSIUM CHLORIDE 750 MG/1
20 TABLET, FILM COATED, EXTENDED RELEASE ORAL DAILY
Qty: 180 TABLET | Refills: 1 | Status: SHIPPED | OUTPATIENT
Start: 2023-11-06

## 2023-11-06 RX ADMIN — TESTOSTERONE CYPIONATE 200 MG: 200 INJECTION, SOLUTION INTRAMUSCULAR at 09:22

## 2023-11-06 NOTE — TELEPHONE ENCOUNTER
ON 11/2 PATIENT'S P[facility]ASSI[facility] P[facility].

## 2023-11-13 RX ORDER — TAMSULOSIN HYDROCHLORIDE 0.4 MG/1
1 CAPSULE ORAL DAILY
Qty: 90 CAPSULE | Refills: 0 | Status: SHIPPED | OUTPATIENT
Start: 2023-11-13

## 2023-11-13 RX ORDER — PANTOPRAZOLE SODIUM 40 MG/1
TABLET, DELAYED RELEASE ORAL
Qty: 90 TABLET | Refills: 1 | Status: SHIPPED | OUTPATIENT
Start: 2023-11-13

## 2023-11-20 ENCOUNTER — CLINICAL SUPPORT (OUTPATIENT)
Dept: FAMILY MEDICINE CLINIC | Facility: CLINIC | Age: 73
End: 2023-11-20
Payer: MEDICARE

## 2023-11-20 DIAGNOSIS — E34.9 TESTOSTERONE DEFICIENCY: Primary | ICD-10-CM

## 2023-11-20 RX ADMIN — TESTOSTERONE CYPIONATE 200 MG: 200 INJECTION, SOLUTION INTRAMUSCULAR at 09:39

## 2023-11-29 ENCOUNTER — OFFICE VISIT (OUTPATIENT)
Dept: FAMILY MEDICINE CLINIC | Facility: CLINIC | Age: 73
End: 2023-11-29
Payer: MEDICARE

## 2023-11-29 VITALS
DIASTOLIC BLOOD PRESSURE: 78 MMHG | OXYGEN SATURATION: 94 % | RESPIRATION RATE: 18 BRPM | SYSTOLIC BLOOD PRESSURE: 110 MMHG | HEART RATE: 78 BPM

## 2023-11-29 DIAGNOSIS — I87.2 VENOUS STASIS DERMATITIS OF LEFT LOWER EXTREMITY: ICD-10-CM

## 2023-11-29 DIAGNOSIS — I87.2 VENOUS INSUFFICIENCY OF LEFT LOWER EXTREMITY: ICD-10-CM

## 2023-11-29 DIAGNOSIS — M77.8 RIGHT WRIST TENDONITIS: Primary | ICD-10-CM

## 2023-11-29 RX ORDER — METHYLPREDNISOLONE 4 MG/1
TABLET ORAL
Qty: 21 TABLET | Refills: 0 | Status: SHIPPED | OUTPATIENT
Start: 2023-11-29

## 2023-11-29 NOTE — PROGRESS NOTES
[facility]   Established Patient Visit    Patient Name:Arik August  : 1950  MRN: [id number]  Primary Care Physician: Mary Busby APRN    Subjective    SUBJECTIVE     Chief Complaint  Chief Complaint   Patient presents with    Pain     hand       History of Present Illness  Arik August is a 73 y.o. male who presents to clinic  pain in R hand .     R Forearm/Elbow/Wrist/Hand pain for the past several weeks; started in elbow and now radiating to wrist; progressively worsening; hx of arthritis and pseudogout and prior surgery on that wrist. Sometimes radiates to elbow.     Also has persistent redness of LLE and some swelling; was treated with two separate antibiotics and overall improved but concerned infection still present    Review of Systems  A medically appropriate and patient-specific review of systems was performed. Pertinent findings are mentioned in the HPI, with no additional significant findings beyond those already noted.    Patient History   The following portions of the patient's history were reviewed and updated as appropriate:   allergies, current medications, problem list, PMHx, PSHx, PFHx, past social history     Objective   OBJECTIVE     Vitals:    23 1042   BP: 110/78   Pulse: 78   Resp: 18   SpO2: 94%        Physical Exam   Constitutional: Alert, well-appearing, no acute distress  HENT: NCAT, mucous membranes moist  Neck: Supple  Respiratory: No respiratory distress  Cardiovascular: RRR, BLE present with stasis dermatitis but no acute infection  MSK: Chronic deformity from prior surgery on R wrist/forearm; tenderness along medial aspect of forearm and into elbow; no edema or erythema  Psychiatric: Appropriate mood and affect, cooperative  Skin: No apparent rashes or lesions         Assessment    ASSESSMENT & PLAN     Diagnoses and all orders for this visit:    Right wrist tendonitis  Comments:  Medrol dose pack for inflammation. Discussed  potential PT if not improving. Continue current pain regimen.  Orders:  -     methylPREDNISolone (MEDROL) 4 MG dose pack; Take as directed on package instructions.    Venous insufficiency of left lower extremity  Comments:  Chronic, stable  Provided reassurance, no evidence of infection. Recommended compression stockings if concerned.    Venous stasis dermatitis of left lower extremity        Follow Up   Follow up with PCP PRN      This medical documentation was produced in part using speech recognition software (Dragon Dictation System) and may contain errors related to that system including errors in grammar, punctuation, and spelling, as well as words and phrases that may be inappropriate and not intentional --- If there are any questions or concerns please feel free to contact me, the dictating provider, for clarification.      Yareli Downing MD

## 2023-12-04 ENCOUNTER — CLINICAL SUPPORT (OUTPATIENT)
Dept: FAMILY MEDICINE CLINIC | Facility: CLINIC | Age: 73
End: 2023-12-04
Payer: MEDICARE

## 2023-12-04 DIAGNOSIS — E34.9 TESTOSTERONE DEFICIENCY: Primary | ICD-10-CM

## 2023-12-04 RX ADMIN — TESTOSTERONE CYPIONATE 200 MG: 200 INJECTION, SOLUTION INTRAMUSCULAR at 09:36

## 2023-12-07 ENCOUNTER — TELEPHONE (OUTPATIENT)
Dept: FAMILY MEDICINE CLINIC | Facility: CLINIC | Age: 73
End: 2023-12-07
Payer: MEDICARE

## 2023-12-07 RX ORDER — TADALAFIL 20 MG/1
20 TABLET ORAL DAILY PRN
Qty: 30 TABLET | Refills: 0 | Status: SHIPPED | OUTPATIENT
Start: 2023-12-07 | End: 2023-12-08 | Stop reason: SDUPTHER

## 2023-12-07 NOTE — TELEPHONE ENCOUNTER
"    Caller: Arik August \"JOEY\"    Relationship: Self    Best call back number: [phone]     What medication are you requesting: TADALAFIL 20 MG    If a prescription is needed, what is your preferred pharmacy and phone number:  PATIENT WILL  WRITTEN PRESCRIPTION     Additional notes: PATIENT NEEDS THIS CHANGED TO 20 MG, PLEASE ADVISE WHEN READY FOR . HE USES A [facility].     "

## 2023-12-08 DIAGNOSIS — N52.9 ERECTILE DYSFUNCTION, UNSPECIFIED ERECTILE DYSFUNCTION TYPE: Primary | ICD-10-CM

## 2023-12-08 RX ORDER — TADALAFIL 20 MG/1
20 TABLET ORAL DAILY PRN
Qty: 30 TABLET | Refills: 0 | Status: SHIPPED | OUTPATIENT
Start: 2023-12-08

## 2023-12-10 RX ORDER — ROSUVASTATIN CALCIUM 20 MG/1
20 TABLET, COATED ORAL DAILY
Qty: 90 TABLET | Refills: 1 | Status: SHIPPED | OUTPATIENT
Start: 2023-12-10

## 2023-12-10 RX ORDER — BUMETANIDE 2 MG/1
2 TABLET ORAL 2 TIMES DAILY PRN
Qty: 180 TABLET | Refills: 0 | Status: SHIPPED | OUTPATIENT
Start: 2023-12-10

## 2023-12-12 ENCOUNTER — TELEPHONE (OUTPATIENT)
Dept: FAMILY MEDICINE CLINIC | Facility: CLINIC | Age: 73
End: 2023-12-12
Payer: MEDICARE

## 2023-12-12 NOTE — TELEPHONE ENCOUNTER
HUB TO READ     30 tablets is a 90 day  (10 tablets per month)    I need more than a phone number or a fax number. These numbers are not in our system. I need pharmacy info  such as name, address, etc..    If he has a patient ID from an account there that he has set up, I will need that as well.

## 2023-12-12 NOTE — TELEPHONE ENCOUNTER
"Caller: Arik August \"JOEY\"    Relationship: Self    Best call back number: [phone]    Which medication are you concerned about: tadalafil (Cialis) 20 MG tablet     What are your concerns: REQUESTING SCRIPT CHANGED TO 90 DAYS AND SENT TO [facility] PHONE # [phone]  FAX # [phone]    "

## 2023-12-18 ENCOUNTER — CLINICAL SUPPORT (OUTPATIENT)
Dept: FAMILY MEDICINE CLINIC | Facility: CLINIC | Age: 73
End: 2023-12-18
Payer: MEDICARE

## 2023-12-18 DIAGNOSIS — E34.9 TESTOSTERONE DEFICIENCY: Primary | ICD-10-CM

## 2023-12-18 RX ORDER — DICLOFENAC SODIUM 75 MG/1
TABLET, DELAYED RELEASE ORAL
Qty: 180 TABLET | Refills: 1 | OUTPATIENT
Start: 2023-12-18

## 2023-12-18 RX ADMIN — TESTOSTERONE CYPIONATE 200 MG: 200 INJECTION, SOLUTION INTRAMUSCULAR at 09:40

## 2023-12-19 ENCOUNTER — TELEPHONE (OUTPATIENT)
Dept: FAMILY MEDICINE CLINIC | Facility: CLINIC | Age: 73
End: 2023-12-19

## 2023-12-19 NOTE — TELEPHONE ENCOUNTER
We received the fax from them. I gave to Dr. Felipe for signature and it needs to be faxed back. This was yesterday.    I realize [facility] is PCP now on chart but Matteo put in order and fax was in his name..       An, I am just checking to see if it got faxed back?     This is Cialis 20mg  #90 with good rx is       $27.46    For #30 with ED dx is 6.00 cash pay    Is he taking this for ED as directions state or  for CAD or both?     ED is #10 per  month, #30 per 90 days  This may be the problem.  The fax did not look professional at all.  Maybe we do not need to mess with his place.

## 2023-12-19 NOTE — TELEPHONE ENCOUNTER
"  Caller: Arik August \"JOEY\"    Relationship: Self    Best call back number: [phone]       What was the call regarding: PATIENT HAS HAD TROUBLE GETTING THE tadalafil (Cialis) 20 MG tablet FILLED AT THE PHARMACY. THE PHARMACY IS IN [place] AND PATIENT IS NEEDING A NEW RX WRITTEN FOR HIM TO .     IT NEEDS TO BE FOR WRITTEN AS 20 MG AND QUANTITY OF 90 . (NOT AS 90 DAY SUPPLY)    THERE WERE SEVERAL ERRORS PREVIOUSLY AND THE PHARMACY TOLD HIM THIS WOULD BE THE EASIEST WAY TO FIX IT.    PLEASE CALL TO   "

## 2023-12-26 ENCOUNTER — TELEPHONE (OUTPATIENT)
Dept: FAMILY MEDICINE CLINIC | Facility: CLINIC | Age: 73
End: 2023-12-26
Payer: MEDICARE

## 2023-12-26 NOTE — TELEPHONE ENCOUNTER
"    Caller: Arik August \"JOEY\"    Relationship: Self    Best call back number: [phone]    What medication are you requesting: DICLOFENAC 75 MG  2 TIMES A DAY 3 MONTH SUPPLY     What are your current symptoms:     How long have you been experiencing symptoms:     Have you had these symptoms before:    [x] Yes  [] No    Have you been treated for these symptoms before:   [x] Yes  [] No    If a prescription is needed, what is your preferred pharmacy and phone number: [facility] [id number] - [place], [place] - [address] - [phone] [facility] [phone]      Additional notes:   IS OUT OF MEDICATION       "

## 2023-12-27 RX ORDER — DICLOFENAC SODIUM 75 MG/1
75 TABLET, DELAYED RELEASE ORAL 2 TIMES DAILY
Qty: 180 TABLET | Refills: 2 | Status: SHIPPED | OUTPATIENT
Start: 2023-12-27

## 2024-01-02 ENCOUNTER — CLINICAL SUPPORT (OUTPATIENT)
Dept: FAMILY MEDICINE CLINIC | Facility: CLINIC | Age: 74
End: 2024-01-02
Payer: MEDICARE

## 2024-01-02 DIAGNOSIS — E34.9 TESTOSTERONE DEFICIENCY: Primary | ICD-10-CM

## 2024-01-02 RX ADMIN — TESTOSTERONE CYPIONATE 200 MG: 200 INJECTION, SOLUTION INTRAMUSCULAR at 09:17

## 2024-01-03 ENCOUNTER — OFFICE VISIT (OUTPATIENT)
Dept: FAMILY MEDICINE CLINIC | Facility: CLINIC | Age: 74
End: 2024-01-03
Payer: MEDICARE

## 2024-01-03 VITALS
BODY MASS INDEX: 29.69 KG/M2 | DIASTOLIC BLOOD PRESSURE: 84 MMHG | OXYGEN SATURATION: 98 % | HEART RATE: 67 BPM | RESPIRATION RATE: 16 BRPM | HEIGHT: 72 IN | SYSTOLIC BLOOD PRESSURE: 130 MMHG | WEIGHT: 219.2 LBS

## 2024-01-03 DIAGNOSIS — I25.10 CORONARY ARTERY DISEASE INVOLVING NATIVE CORONARY ARTERY OF NATIVE HEART WITHOUT ANGINA PECTORIS: ICD-10-CM

## 2024-01-03 DIAGNOSIS — E11.65 TYPE 2 DIABETES MELLITUS WITH HYPERGLYCEMIA, WITHOUT LONG-TERM CURRENT USE OF INSULIN: ICD-10-CM

## 2024-01-03 DIAGNOSIS — E78.2 MIXED HYPERLIPIDEMIA: ICD-10-CM

## 2024-01-03 DIAGNOSIS — I10 ESSENTIAL HYPERTENSION: Primary | ICD-10-CM

## 2024-01-03 PROCEDURE — 1160F RVW MEDS BY RX/DR IN RCRD: CPT | Performed by: NURSE PRACTITIONER

## 2024-01-03 PROCEDURE — 1159F MED LIST DOCD IN RCRD: CPT | Performed by: NURSE PRACTITIONER

## 2024-01-03 PROCEDURE — 3079F DIAST BP 80-89 MM HG: CPT | Performed by: NURSE PRACTITIONER

## 2024-01-03 PROCEDURE — 99214 OFFICE O/P EST MOD 30 MIN: CPT | Performed by: NURSE PRACTITIONER

## 2024-01-03 PROCEDURE — 3075F SYST BP GE 130 - 139MM HG: CPT | Performed by: NURSE PRACTITIONER

## 2024-01-03 RX ORDER — LEVOTHYROXINE SODIUM 0.1 MG/1
100 TABLET ORAL DAILY
Qty: 90 TABLET | Refills: 0 | Status: SHIPPED | OUTPATIENT
Start: 2024-01-03

## 2024-01-03 RX ORDER — LISINOPRIL 10 MG/1
10 TABLET ORAL DAILY
Qty: 90 TABLET | Refills: 1
Start: 2024-01-03

## 2024-01-03 NOTE — PROGRESS NOTES
"Chief Complaint  Follow-up (HTN, diabetes)  Subjective        Arik August presents to [facility]  History of Present Illness  Pt comes in today for 6 month follow up on HTN and DM.  Getting ready to have left shoulder surgery on . Had to wait at least 8 months post PCI.   BP stable  Just had labs checked and A1C was 6.5. states his BS has been higher than normal due to holidays and eating more sweets. Will work on getting it back down.   Taking meds as prescribed w/o side effects.   Having some concerns about swelling in lower legs, left worse than right. Has been worse since having cellulitis. Wears compression socks.        Objective     Vital Signs:   /84 (BP Location: Left arm, Patient Position: Sitting, Cuff Size: Adult)   Pulse 67   Resp 16   Ht 182.9 cm (72\")   Wt 99.4 kg (219 lb 3.2 oz)   SpO2 98%   BMI 29.73 kg/m²       BP Readings from Last 3 Encounters:   24 130/84   23 110/78   23 126/86       Wt Readings from Last 3 Encounters:   24 99.4 kg (219 lb 3.2 oz)   23 95.8 kg (211 lb 3.2 oz)   23 94.8 kg (209 lb)     Physical Exam  Constitutional:       Appearance: He is well-developed.   Eyes:      Pupils: Pupils are equal, round, and reactive to light.   Cardiovascular:      Rate and Rhythm: Normal rate and regular rhythm.   Pulmonary:      Effort: Pulmonary effort is normal.      Breath sounds: Normal breath sounds.   Musculoskeletal:      Right lower leg: Edema (trace) present.      Left lower le+ Pitting Edema present.   Neurological:      Mental Status: He is alert and oriented to person, place, and time.        Result Review :     A1C Last 3 Results          3/30/2023    09:12 2023    09:42 2023    08:13   HGBA1C Last 3 Results   Hemoglobin A1C 7.60  6.40  6.5          Details          This result is from an external source.                       Assessment and Plan    Diagnoses and all orders for this " visit:    1. Essential hypertension (Primary)  Assessment & Plan:  Hypertension is improving with treatment.  Continue current treatment regimen.  Dietary sodium restriction.  Weight loss.  Regular aerobic exercise.  Continue current medications.  Blood pressure will be reassessed at the next regular appointment.      2. Coronary artery disease involving native coronary artery of native heart without angina pectoris    3. Mixed hyperlipidemia  Assessment & Plan:  Lipid abnormalities are improving with treatment.  Pharmacotherapy as ordered.  Lipids will be reassessed in 6 months.      4. Type 2 diabetes mellitus with hyperglycemia, without long-term current use of insulin  Assessment & Plan:  Diabetes is unchanged.   Continue current treatment regimen.  Reminded to bring in blood sugar diary at next visit.  Dietary recommendations for ADA diet.  Regular aerobic exercise.  Discussed foot care.  Reminded to get yearly retinal exam.  Diabetes will be reassessed in 6 months.      Other orders  -     lisinopril (PRINIVIL,ZESTRIL) 10 MG tablet; Take 1 tablet by mouth Daily.  Dispense: 90 tablet; Refill: 1    Follow up with cardiology as scheduled  Follow up with rheum as scheduled  Reviewed labs  Shoulder surgery in feb  During this office visit, we discussed the pertinent aspects of the visit and treatment recommendations. Pt verbalizes understanding. Follow up was discussed. Patient was given the opportunity to ask questions and discuss other concerns.   Discussed importance of regular exercise and recommended starting or continuing a regular exercise program for good health. The patient was also encouraged to lose weight for better health.         Follow Up   Return in about 6 months (around 7/3/2024) for Medicare Wellness.  Patient was given instructions and counseling regarding his condition or for health maintenance advice. Please see specific information pulled into the AVS if appropriate.

## 2024-01-16 ENCOUNTER — CLINICAL SUPPORT (OUTPATIENT)
Dept: FAMILY MEDICINE CLINIC | Facility: CLINIC | Age: 74
End: 2024-01-16
Payer: MEDICARE

## 2024-01-16 DIAGNOSIS — E34.9 TESTOSTERONE DEFICIENCY: Primary | ICD-10-CM

## 2024-01-16 PROCEDURE — 96372 THER/PROPH/DIAG INJ SC/IM: CPT | Performed by: NURSE PRACTITIONER

## 2024-01-16 RX ADMIN — TESTOSTERONE CYPIONATE 200 MG: 200 INJECTION, SOLUTION INTRAMUSCULAR at 09:30

## 2024-01-30 ENCOUNTER — CLINICAL SUPPORT (OUTPATIENT)
Dept: FAMILY MEDICINE CLINIC | Facility: CLINIC | Age: 74
End: 2024-01-30
Payer: MEDICARE

## 2024-01-30 DIAGNOSIS — E29.1 DEFICIENCY OF TESTOSTERONE BIOSYNTHESIS: Primary | ICD-10-CM

## 2024-01-30 RX ADMIN — TESTOSTERONE CYPIONATE 200 MG: 200 INJECTION, SOLUTION INTRAMUSCULAR at 09:33

## 2024-02-08 RX ORDER — TAMSULOSIN HYDROCHLORIDE 0.4 MG/1
1 CAPSULE ORAL DAILY
Qty: 90 CAPSULE | Refills: 0 | Status: SHIPPED | OUTPATIENT
Start: 2024-02-08

## 2024-02-13 ENCOUNTER — CLINICAL SUPPORT (OUTPATIENT)
Dept: FAMILY MEDICINE CLINIC | Facility: CLINIC | Age: 74
End: 2024-02-13
Payer: MEDICARE

## 2024-02-13 DIAGNOSIS — E34.9 TESTOSTERONE DEFICIENCY: Primary | ICD-10-CM

## 2024-02-13 PROCEDURE — 96372 THER/PROPH/DIAG INJ SC/IM: CPT | Performed by: NURSE PRACTITIONER

## 2024-02-13 RX ADMIN — TESTOSTERONE CYPIONATE 200 MG: 200 INJECTION, SOLUTION INTRAMUSCULAR at 09:35

## 2024-02-27 ENCOUNTER — CLINICAL SUPPORT (OUTPATIENT)
Dept: FAMILY MEDICINE CLINIC | Facility: CLINIC | Age: 74
End: 2024-02-27
Payer: MEDICARE

## 2024-02-27 DIAGNOSIS — E29.1 DEFICIENCY OF TESTOSTERONE BIOSYNTHESIS: Primary | ICD-10-CM

## 2024-02-27 RX ADMIN — TESTOSTERONE CYPIONATE 200 MG: 200 INJECTION, SOLUTION INTRAMUSCULAR at 09:24

## 2024-03-05 ENCOUNTER — OFFICE VISIT (OUTPATIENT)
Dept: FAMILY MEDICINE CLINIC | Facility: CLINIC | Age: 74
End: 2024-03-05
Payer: MEDICARE

## 2024-03-05 VITALS
RESPIRATION RATE: 18 BRPM | WEIGHT: 213.2 LBS | BODY MASS INDEX: 28.88 KG/M2 | HEART RATE: 86 BPM | DIASTOLIC BLOOD PRESSURE: 60 MMHG | SYSTOLIC BLOOD PRESSURE: 98 MMHG | HEIGHT: 72 IN | OXYGEN SATURATION: 100 %

## 2024-03-05 DIAGNOSIS — I11.0 HYPERTENSIVE HEART DISEASE WITH HEART FAILURE: Primary | ICD-10-CM

## 2024-03-05 PROCEDURE — 99214 OFFICE O/P EST MOD 30 MIN: CPT | Performed by: NURSE PRACTITIONER

## 2024-03-05 PROCEDURE — 1159F MED LIST DOCD IN RCRD: CPT | Performed by: NURSE PRACTITIONER

## 2024-03-05 PROCEDURE — 1160F RVW MEDS BY RX/DR IN RCRD: CPT | Performed by: NURSE PRACTITIONER

## 2024-03-05 PROCEDURE — 3074F SYST BP LT 130 MM HG: CPT | Performed by: NURSE PRACTITIONER

## 2024-03-05 PROCEDURE — 3078F DIAST BP <80 MM HG: CPT | Performed by: NURSE PRACTITIONER

## 2024-03-05 RX ORDER — LISINOPRIL 2.5 MG/1
2.5 TABLET ORAL DAILY
Qty: 30 TABLET | Refills: 1 | Status: SHIPPED | OUTPATIENT
Start: 2024-03-05

## 2024-03-05 NOTE — PROGRESS NOTES
"Chief Complaint  Hypotension  Subjective        Arik August presents to [facility]  History of Present Illness  Pt comes in today with c/o low BP.  Had surgery of left reverse shoulder. Home health nurse/therapist was out about 1 week ago.   Therapist stated his BP was too low and he was sent to hospital. Went there about 1 week ago. Was given IV fluids. BP improved. BP had been as low as 74/?  Typically running 90s systolic.  Has had some dizziness and lightheadedness. Mostly when he gets up   He is currently taking lisinopril 10mg, metoprolol 25mg, and bumex 2mg.     Hypotension         Objective     Vital Signs:   BP 98/60   Pulse 86   Resp 18   Ht 182.9 cm (72.01\")   Wt 96.7 kg (213 lb 3.2 oz)   SpO2 100%   BMI 28.91 kg/m²       BP Readings from Last 3 Encounters:   03/05/24 98/60   01/03/24 130/84   11/29/23 110/78       Wt Readings from Last 3 Encounters:   03/05/24 96.7 kg (213 lb 3.2 oz)   01/03/24 99.4 kg (219 lb 3.2 oz)   11/02/23 95.8 kg (211 lb 3.2 oz)     Physical Exam  Constitutional:       Appearance: He is well-developed.   Eyes:      Pupils: Pupils are equal, round, and reactive to light.   Cardiovascular:      Rate and Rhythm: Normal rate and regular rhythm.   Pulmonary:      Effort: Pulmonary effort is normal.      Breath sounds: Normal breath sounds.   Neurological:      Mental Status: He is alert and oriented to person, place, and time.        Result Review :                 Assessment and Plan    Diagnoses and all orders for this visit:    1. Hypertensive heart disease with heart failure (Primary)  -     lisinopril (Zestril) 2.5 MG tablet; Take 1 tablet by mouth Daily.  Dispense: 30 tablet; Refill: 1    Decrease lisinopril to 2.5mg daily  Cont to monitor BP at home and keep diary  During this office visit, we discussed the pertinent aspects of the visit and treatment recommendations. Pt verbalizes understanding. Follow up was discussed. Patient was " given the opportunity to ask questions and discuss other concerns.         Follow Up   Return in about 4 weeks (around 4/2/2024) for HTN follow up.  Patient was given instructions and counseling regarding his condition or for health maintenance advice. Please see specific information pulled into the AVS if appropriate.

## 2024-03-12 ENCOUNTER — CLINICAL SUPPORT (OUTPATIENT)
Dept: FAMILY MEDICINE CLINIC | Facility: CLINIC | Age: 74
End: 2024-03-12
Payer: MEDICARE

## 2024-03-12 DIAGNOSIS — E34.9 TESTOSTERONE DEFICIENCY: Primary | ICD-10-CM

## 2024-03-12 RX ORDER — TESTOSTERONE CYPIONATE 1000 MG/10ML
200 INJECTION, SOLUTION INTRAMUSCULAR ONCE
Status: COMPLETED | OUTPATIENT
Start: 2024-03-12 | End: 2024-03-12

## 2024-03-12 RX ADMIN — TESTOSTERONE CYPIONATE 200 MG: 1000 INJECTION, SOLUTION INTRAMUSCULAR at 09:34

## 2024-04-03 ENCOUNTER — OFFICE VISIT (OUTPATIENT)
Dept: FAMILY MEDICINE CLINIC | Facility: CLINIC | Age: 74
End: 2024-04-03
Payer: MEDICARE

## 2024-04-03 ENCOUNTER — LAB (OUTPATIENT)
Dept: FAMILY MEDICINE CLINIC | Facility: CLINIC | Age: 74
End: 2024-04-03
Payer: MEDICARE

## 2024-04-03 VITALS
SYSTOLIC BLOOD PRESSURE: 132 MMHG | BODY MASS INDEX: 28.55 KG/M2 | HEART RATE: 78 BPM | RESPIRATION RATE: 18 BRPM | WEIGHT: 210.8 LBS | DIASTOLIC BLOOD PRESSURE: 72 MMHG | HEIGHT: 72 IN

## 2024-04-03 DIAGNOSIS — R29.898 BILATERAL LEG WEAKNESS: ICD-10-CM

## 2024-04-03 DIAGNOSIS — Z00.00 MEDICARE ANNUAL WELLNESS VISIT, SUBSEQUENT: Primary | ICD-10-CM

## 2024-04-03 DIAGNOSIS — E03.9 ACQUIRED HYPOTHYROIDISM: ICD-10-CM

## 2024-04-03 DIAGNOSIS — E87.1 HYPONATREMIA: ICD-10-CM

## 2024-04-03 DIAGNOSIS — E29.1 DEFICIENCY OF TESTOSTERONE BIOSYNTHESIS: ICD-10-CM

## 2024-04-03 DIAGNOSIS — M79.605 LEFT LEG PAIN: ICD-10-CM

## 2024-04-03 DIAGNOSIS — Z00.00 MEDICARE ANNUAL WELLNESS VISIT, SUBSEQUENT: ICD-10-CM

## 2024-04-03 DIAGNOSIS — Z12.5 SCREENING FOR PROSTATE CANCER: ICD-10-CM

## 2024-04-03 DIAGNOSIS — E11.65 TYPE 2 DIABETES MELLITUS WITH HYPERGLYCEMIA, WITHOUT LONG-TERM CURRENT USE OF INSULIN: ICD-10-CM

## 2024-04-03 LAB
ALBUMIN SERPL-MCNC: 4.2 G/DL (ref 3.5–5.2)
ALBUMIN UR-MCNC: 1.4 MG/DL
ALBUMIN/GLOB SERPL: 1.6 G/DL
ALP SERPL-CCNC: 72 U/L (ref 39–117)
ALT SERPL W P-5'-P-CCNC: 22 U/L (ref 1–41)
ANION GAP SERPL CALCULATED.3IONS-SCNC: 12.5 MMOL/L (ref 5–15)
AST SERPL-CCNC: 24 U/L (ref 1–40)
BASOPHILS # BLD AUTO: 0.07 10*3/MM3 (ref 0–0.2)
BASOPHILS NFR BLD AUTO: 0.9 % (ref 0–1.5)
BILIRUB SERPL-MCNC: 0.3 MG/DL (ref 0–1.2)
BUN SERPL-MCNC: 30 MG/DL (ref 8–23)
BUN/CREAT SERPL: 21.1 (ref 7–25)
CALCIUM SPEC-SCNC: 9.5 MG/DL (ref 8.6–10.5)
CHLORIDE SERPL-SCNC: 97 MMOL/L (ref 98–107)
CHOLEST SERPL-MCNC: 156 MG/DL (ref 0–200)
CO2 SERPL-SCNC: 29.5 MMOL/L (ref 22–29)
CREAT SERPL-MCNC: 1.42 MG/DL (ref 0.76–1.27)
CREAT UR-MCNC: 102.7 MG/DL
DEPRECATED RDW RBC AUTO: 50.1 FL (ref 37–54)
EGFRCR SERPLBLD CKD-EPI 2021: 52.5 ML/MIN/1.73
EOSINOPHIL # BLD AUTO: 0.38 10*3/MM3 (ref 0–0.4)
EOSINOPHIL NFR BLD AUTO: 4.7 % (ref 0.3–6.2)
ERYTHROCYTE [DISTWIDTH] IN BLOOD BY AUTOMATED COUNT: 14.1 % (ref 12.3–15.4)
GLOBULIN UR ELPH-MCNC: 2.6 GM/DL
GLUCOSE SERPL-MCNC: 131 MG/DL (ref 65–99)
HBA1C MFR BLD: 6.5 % (ref 4.8–5.6)
HCT VFR BLD AUTO: 45.6 % (ref 37.5–51)
HDLC SERPL-MCNC: 39 MG/DL (ref 40–60)
HGB BLD-MCNC: 15 G/DL (ref 13–17.7)
IMM GRANULOCYTES # BLD AUTO: 0.03 10*3/MM3 (ref 0–0.05)
IMM GRANULOCYTES NFR BLD AUTO: 0.4 % (ref 0–0.5)
LDLC SERPL CALC-MCNC: 89 MG/DL (ref 0–100)
LDLC/HDLC SERPL: 2.17 {RATIO}
LYMPHOCYTES # BLD AUTO: 1.5 10*3/MM3 (ref 0.7–3.1)
LYMPHOCYTES NFR BLD AUTO: 18.5 % (ref 19.6–45.3)
MCH RBC QN AUTO: 32.5 PG (ref 26.6–33)
MCHC RBC AUTO-ENTMCNC: 32.9 G/DL (ref 31.5–35.7)
MCV RBC AUTO: 98.9 FL (ref 79–97)
MICROALBUMIN/CREAT UR: 13.6 MG/G (ref 0–29)
MONOCYTES # BLD AUTO: 0.73 10*3/MM3 (ref 0.1–0.9)
MONOCYTES NFR BLD AUTO: 9 % (ref 5–12)
NEUTROPHILS NFR BLD AUTO: 5.38 10*3/MM3 (ref 1.7–7)
NEUTROPHILS NFR BLD AUTO: 66.5 % (ref 42.7–76)
NRBC BLD AUTO-RTO: 0 /100 WBC (ref 0–0.2)
PLATELET # BLD AUTO: 213 10*3/MM3 (ref 140–450)
PMV BLD AUTO: 10.1 FL (ref 6–12)
POTASSIUM SERPL-SCNC: 4.5 MMOL/L (ref 3.5–5.2)
PROT SERPL-MCNC: 6.8 G/DL (ref 6–8.5)
PSA SERPL-MCNC: 1.45 NG/ML (ref 0–4)
RBC # BLD AUTO: 4.61 10*6/MM3 (ref 4.14–5.8)
SODIUM SERPL-SCNC: 139 MMOL/L (ref 136–145)
TRIGL SERPL-MCNC: 161 MG/DL (ref 0–150)
TSH SERPL DL<=0.05 MIU/L-ACNC: 1.51 UIU/ML (ref 0.27–4.2)
VLDLC SERPL-MCNC: 28 MG/DL (ref 5–40)
WBC NRBC COR # BLD AUTO: 8.09 10*3/MM3 (ref 3.4–10.8)

## 2024-04-03 PROCEDURE — 84403 ASSAY OF TOTAL TESTOSTERONE: CPT | Performed by: NURSE PRACTITIONER

## 2024-04-03 PROCEDURE — 80061 LIPID PANEL: CPT | Performed by: NURSE PRACTITIONER

## 2024-04-03 PROCEDURE — 83036 HEMOGLOBIN GLYCOSYLATED A1C: CPT | Performed by: NURSE PRACTITIONER

## 2024-04-03 PROCEDURE — 84402 ASSAY OF FREE TESTOSTERONE: CPT | Performed by: NURSE PRACTITIONER

## 2024-04-03 PROCEDURE — 36415 COLL VENOUS BLD VENIPUNCTURE: CPT

## 2024-04-03 PROCEDURE — 85025 COMPLETE CBC W/AUTO DIFF WBC: CPT | Performed by: NURSE PRACTITIONER

## 2024-04-03 PROCEDURE — 82570 ASSAY OF URINE CREATININE: CPT | Performed by: NURSE PRACTITIONER

## 2024-04-03 PROCEDURE — 82043 UR ALBUMIN QUANTITATIVE: CPT | Performed by: NURSE PRACTITIONER

## 2024-04-03 PROCEDURE — 84443 ASSAY THYROID STIM HORMONE: CPT | Performed by: NURSE PRACTITIONER

## 2024-04-03 PROCEDURE — 80053 COMPREHEN METABOLIC PANEL: CPT | Performed by: NURSE PRACTITIONER

## 2024-04-03 PROCEDURE — G0103 PSA SCREENING: HCPCS | Performed by: NURSE PRACTITIONER

## 2024-04-03 NOTE — PROGRESS NOTES
The ABCs of the Annual Wellness Visit  Subsequent Medicare Wellness Visit    Subjective    Arik August is a 73 y.o. male who presents for a Subsequent Medicare Wellness Visit.    The following portions of the patient's history were reviewed and   updated as appropriate: allergies, current medications, past family history, past medical history, past social history, past surgical history, and problem list.    Compared to one year ago, the patient feels his physical   health is the same.    Compared to one year ago, the patient feels his mental   health is the same.    Recent Hospitalizations:  He was not admitted to the hospital during the last year.       Current Medical Providers:  Patient Care Team:  Mary Busby APRN as PCP - General (Nurse Practitioner)  Marvin Mathis MD as Consulting Physician (Rheumatology)  Sven Zendejas MD as Consulting Physician (Orthopedic Surgery)  Bandar So NP (Nurse Practitioner)  Dionicio Salguero MD as Consulting Physician (Internal Medicine)  Ryan Wallace MD as Consulting Physician (Neurology)  Familia Cayr MD as Consulting Physician (Hematology and Oncology)    Outpatient Medications Prior to Visit   Medication Sig Dispense Refill   • aspirin 81 MG chewable tablet Chew 1 tablet Daily.     • bumetanide (BUMEX) 2 MG tablet TAKE 1 TABLET BY MOUTH TWICE A DAY AS NEEDED 180 tablet 0   • diclofenac (VOLTAREN) 75 MG EC tablet Take 1 tablet by mouth 2 (Two) Times a Day. 180 tablet 2   • folic acid (FOLVITE) 1 MG tablet 2 tablets daily     • levothyroxine (SYNTHROID, LEVOTHROID) 100 MCG tablet TAKE 1 TABLET BY MOUTH DAILY 90 tablet 0   • lisinopril (Zestril) 2.5 MG tablet Take 1 tablet by mouth Daily. 30 tablet 1   • magnesium oxide (MAG-OX) 400 MG tablet TAKE ONE TABLET BY MOUTH DAILY 90 tablet 0   • Magnesium Oxide -Mg Supplement (MAGnesium-Oxide) 400 (240 Mg) MG tablet TAKE 1 TABLET BY MOUTH DAILY 90 tablet 1   • Melatonin 10 MG capsule  Take  by mouth Every Night.     • methotrexate 2.5 MG tablet Take  by mouth. 6 tablets weekly     • metoprolol succinate XL (TOPROL-XL) 25 MG 24 hr tablet Take 2 tablets by mouth Daily. 90 tablet 1   • Multiple Vitamins-Minerals (DAILY MULTIVITAMIN) capsule DAILY MULTIVITAMIN CAPS     • oxyCODONE-acetaminophen (PERCOCET)  MG per tablet Every 8 (Eight) Hours.     • pantoprazole (PROTONIX) 40 MG EC tablet TAKE ONE TABLET BY MOUTH DAILY 90 tablet 1   • Plavix 75 MG tablet Take 1 tablet by mouth Daily.     • potassium chloride 10 MEQ CR tablet Take 2 tablets by mouth Daily. 180 tablet 1   • rosuvastatin (CRESTOR) 20 MG tablet TAKE 1 TABLET BY MOUTH DAILY 90 tablet 1   • Sarilumab (Kevzara) 200 MG/1.14ML solution auto-injector Inject 1.14 mL under the skin into the appropriate area as directed.     • tadalafil (Cialis) 20 MG tablet Take 1 tablet by mouth Daily As Needed for Erectile Dysfunction. 30 tablet 0   • tamsulosin (FLOMAX) 0.4 MG capsule 24 hr capsule TAKE 1 CAPSULE BY MOUTH DAILY 90 capsule 0   • vitamin B-12 (CYANOCOBALAMIN) 100 MCG tablet Take 0.5 tablets by mouth Daily.       Facility-Administered Medications Prior to Visit   Medication Dose Route Frequency Provider Last Rate Last Admin   • Testosterone Cypionate (DEPOTESTOTERONE CYPIONATE) injection 200 mg  200 mg Intramuscular Q14 Days Mary Busby, APRN   200 mg at 02/27/24 0924       Opioid medication/s are on active medication list.  and I have evaluated his active treatment plan and pain score trends (see table).  There were no vitals filed for this visit.  I have reviewed the chart for potential of high risk medication and harmful drug interactions in the elderly.          Aspirin is on active medication list. Aspirin use is indicated based on review of current medical condition/s. Pros and cons of this therapy have been discussed today. Benefits of this medication outweigh potential harm.  Patient has been encouraged to continue taking this  "medication.  .      Patient Active Problem List   Diagnosis   • S/P CABG x 5   • Ischemic cardiomyopathy   • Deficiency of testosterone biosynthesis   • Coronary artery disease involving native coronary artery of native heart without angina pectoris   • Mixed hyperlipidemia   • Essential hypertension   • Type 2 diabetes mellitus with hyperglycemia, without long-term current use of insulin   • Failed orthopedic implant   • Lumbar post-laminectomy syndrome   • Myofascial pain   • Pseudarthrosis following spinal fusion   • Thoracic back pain   • Weakness of both legs   • Thoracic spine fracture   • Pseudoarthrosis of lumbar spine   • Hypothyroidism   • Cubital tunnel syndrome   • Acute blood loss as cause of postoperative anemia   • Mechanical complication of orthopedic internal fixation device   • Rheumatoid arthritis involving multiple sites with positive rheumatoid factor   • Testosterone deficiency   • Aortic valve stenosis     Advance Care Planning   Advance Care Planning     Advance Directive is not on file.  ACP discussion was held with the patient during this visit. Patient does not have an advance directive, information provided.     Objective    Vitals:    04/03/24 0834   BP: 132/72   Pulse: 78   Resp: 18   Weight: 95.6 kg (210 lb 12.8 oz)   Height: 182.9 cm (72.01\")     Estimated body mass index is 28.58 kg/m² as calculated from the following:    Height as of this encounter: 182.9 cm (72.01\").    Weight as of this encounter: 95.6 kg (210 lb 12.8 oz).    BMI is >= 25 and <30. (Overweight) The following options were offered after discussion;: exercise counseling/recommendations and nutrition counseling/recommendations      Does the patient have evidence of cognitive impairment? No          HEALTH RISK ASSESSMENT    Smoking Status:  Social History     Tobacco Use   Smoking Status Former   • Current packs/day: 0.00   • Average packs/day: 1.5 packs/day for 15.0 years (22.5 ttl pk-yrs)   • Types: Cigarettes   • " Start date: 1977   • Quit date: 1992   • Years since quittin.9   Smokeless Tobacco Never     Alcohol Consumption:  Social History     Substance and Sexual Activity   Alcohol Use Not Currently    Comment: Occ Beer     Fall Risk Screen:    BRIANADI Fall Risk Assessment was completed, and patient is at LOW risk for falls.Assessment completed on:4/3/2024    Depression Screenin/3/2024     8:35 AM   PHQ-2/PHQ-9 Depression Screening   Little Interest or Pleasure in Doing Things 0-->not at all   Feeling Down, Depressed or Hopeless 0-->not at all   PHQ-9: Brief Depression Severity Measure Score 0       Health Habits and Functional and Cognitive Screenin/3/2024     8:35 AM   Functional & Cognitive Status   Do you have difficulty preparing food and eating? No   Do you have difficulty bathing yourself, getting dressed or grooming yourself? No   Do you have difficulty using the toilet? No   Do you have difficulty moving around from place to place? Yes   Do you have trouble with steps or getting out of a bed or a chair? No   Current Diet Limited Junk Food   Dental Exam Up to date   Eye Exam Up to date   Exercise (times per week) 2 times per week   Current Exercises Include Walking        Exercise Comment shoulder   Do you need help using the phone?  No   Are you deaf or do you have serious difficulty hearing?  No   Do you need help to go to places out of walking distance? No   Do you need help shopping? No   Do you need help preparing meals?  No   Do you need help with housework?  No   Do you need help with laundry? No   Do you need help taking your medications? No   Do you need help managing money? No   Do you ever drive or ride in a car without wearing a seat belt? No       Age-appropriate Screening Schedule:  Refer to the list below for future screening recommendations based on patient's age, sex and/or medical conditions. Orders for these recommended tests are listed in the plan section. The  "patient has been provided with a written plan.    Health Maintenance   Topic Date Due   • URINE MICROALBUMIN  03/30/2024   • HEMOGLOBIN A1C  06/27/2024   • AAA SCREEN (ONE-TIME)  04/03/2024 (Originally 8/2/2019)   • ZOSTER VACCINE (1 of 2) 04/03/2024 (Originally 7/15/2001)   • DIABETIC FOOT EXAM  04/18/2024 (Originally 12/26/2018)   • DIABETIC EYE EXAM  04/23/2024 (Originally 9/1/2023)   • COVID-19 Vaccine (5 - 2023-24 season) 06/23/2024 (Originally 9/1/2023)   • RSV Vaccine - Adults (1 - 1-dose 60+ series) 03/05/2025 (Originally 7/15/2011)   • LIPID PANEL  06/13/2024   • INFLUENZA VACCINE  08/01/2024   • ANNUAL WELLNESS VISIT  04/03/2025   • BMI FOLLOWUP  04/03/2025   • COLORECTAL CANCER SCREENING  07/30/2029   • TDAP/TD VACCINES (2 - Td or Tdap) 03/05/2031   • HEPATITIS C SCREENING  Completed   • Pneumococcal Vaccine 65+  Completed   • LUNG CANCER SCREENING  Discontinued                  CMS Preventative Services Quick Reference  Risk Factors Identified During Encounter  Chronic Pain: Natural history and expected course discussed. Questions answered.  The above risks/problems have been discussed with the patient.  Pertinent information has been shared with the patient in the After Visit Summary.  An After Visit Summary and PPPS were made available to the patient.    Follow Up:   Next Medicare Wellness visit to be scheduled in 1 year.       Additional E&M Note during same encounter follows:  Patient has multiple medical problems which are significant and separately identifiable that require additional work above and beyond the Medicare Wellness Visit.      Chief Complaint  Medicare Wellness-subsequent    Subjective        HPI  Arik August is also being seen today for MWV.   Requestintg referral to PT for strenghening. He is doing better, but wants to cont with PT.          Objective   Vital Signs:  /72   Pulse 78   Resp 18   Ht 182.9 cm (72.01\")   Wt 95.6 kg (210 lb 12.8 oz)   BMI 28.58 kg/m² "     Physical Exam  Constitutional:       Appearance: He is well-developed.   HENT:      Head: Normocephalic.   Eyes:      Conjunctiva/sclera: Conjunctivae normal.      Pupils: Pupils are equal, round, and reactive to light.   Neck:      Thyroid: No thyromegaly.   Cardiovascular:      Rate and Rhythm: Normal rate and regular rhythm.      Heart sounds: No murmur heard.  Pulmonary:      Effort: Pulmonary effort is normal.      Breath sounds: Normal breath sounds.   Abdominal:      General: Bowel sounds are normal.      Palpations: Abdomen is soft.      Tenderness: There is no abdominal tenderness.   Musculoskeletal:         General: Normal range of motion.      Cervical back: Normal range of motion and neck supple.      Right lower le+ Edema present.      Left lower le+ Edema present.   Skin:     General: Skin is warm and dry.      Findings: No lesion.   Neurological:      Mental Status: He is alert and oriented to person, place, and time.   Psychiatric:         Behavior: Behavior normal.                         Assessment and Plan   Diagnoses and all orders for this visit:    1. Medicare annual wellness visit, subsequent (Primary)  -     CBC & Differential; Future  -     Comprehensive Metabolic Panel; Future  -     Hemoglobin A1c; Future  -     Lipid Panel; Future  -     PSA Screen; Future  -     TSH; Future  -     Testosterone (Free & Total), LC / MS; Future    2. Deficiency of testosterone biosynthesis  -     Testosterone (Free & Total), LC / MS; Future    3. Screening for prostate cancer  -     PSA Screen; Future    4. Left leg pain  -     Ambulatory Referral to Physical Therapy Evaluate and treat; Stretching, Strengthening, ROM    5. Bilateral leg weakness  -     Ambulatory Referral to Physical Therapy Evaluate and treat; Stretching, Strengthening, ROM    6. Type 2 diabetes mellitus with hyperglycemia, without long-term current use of insulin  -     Microalbumin / Creatinine Urine Ratio - Urine, Clean  Catch    Check labs  Referral to PT  Discussed importance of regular exercise and recommended starting or continuing a regular exercise program for good health. The patient was also encouraged to lose weight for better health.   During this visit for their annual exam, we reviewed their personal history, social history and family history. We went over their medications and all the recommended health maintenance items for their age group. They were given the opportunity to ask questions and discuss other concerns.            Follow Up   Return in about 6 months (around 10/3/2024), or if symptoms worsen or fail to improve, for HTN follow up, Diabetes follow up.  Patient was given instructions and counseling regarding his condition or for health maintenance advice. Please see specific information pulled into the AVS if appropriate.

## 2024-04-08 LAB
TESTOST FREE SERPL-MCNC: 2.7 PG/ML (ref 6.6–18.1)
TESTOST SERPL-MCNC: 261.8 NG/DL (ref 264–916)

## 2024-04-09 ENCOUNTER — CLINICAL SUPPORT (OUTPATIENT)
Dept: FAMILY MEDICINE CLINIC | Facility: CLINIC | Age: 74
End: 2024-04-09
Payer: MEDICARE

## 2024-04-09 DIAGNOSIS — E34.9 TESTOSTERONE DEFICIENCY: Primary | ICD-10-CM

## 2024-04-09 PROCEDURE — 96372 THER/PROPH/DIAG INJ SC/IM: CPT | Performed by: NURSE PRACTITIONER

## 2024-04-09 RX ADMIN — TESTOSTERONE CYPIONATE 200 MG: 200 INJECTION, SOLUTION INTRAMUSCULAR at 09:10

## 2024-04-11 RX ORDER — LEVOTHYROXINE SODIUM 0.1 MG/1
100 TABLET ORAL DAILY
Qty: 90 TABLET | Refills: 0 | Status: SHIPPED | OUTPATIENT
Start: 2024-04-11

## 2024-04-18 ENCOUNTER — TELEPHONE (OUTPATIENT)
Dept: FAMILY MEDICINE CLINIC | Facility: CLINIC | Age: 74
End: 2024-04-18

## 2024-04-18 NOTE — TELEPHONE ENCOUNTER
"Caller: Arik August \"JOEY\"     Best call back number: [phone]     What is your medical concern?PATIENT HAD LEFT SHOULDER REPLACED FEB 20 2024, AT [facility], TODAY HE WAS DIAGNOSED WITH CELLULITIS AND GIVEN BACTRIM.    PHARMACIST SUGGESTED THAT HE ASK HIS PRIMARY CARE PROVIDER IF HE SHOULD CONTINUE TAKING HIS POTASSIUM PILL WHILE ON BACTRIM?      "

## 2024-04-23 ENCOUNTER — CLINICAL SUPPORT (OUTPATIENT)
Dept: FAMILY MEDICINE CLINIC | Facility: CLINIC | Age: 74
End: 2024-04-23
Payer: MEDICARE

## 2024-04-23 DIAGNOSIS — E34.9 TESTOSTERONE DEFICIENCY: Primary | ICD-10-CM

## 2024-04-23 PROCEDURE — 96372 THER/PROPH/DIAG INJ SC/IM: CPT | Performed by: NURSE PRACTITIONER

## 2024-04-23 RX ADMIN — TESTOSTERONE CYPIONATE 200 MG: 200 INJECTION, SOLUTION INTRAMUSCULAR at 09:27

## 2024-04-24 DIAGNOSIS — E87.6 HYPOKALEMIA: Primary | ICD-10-CM

## 2024-04-25 ENCOUNTER — LAB (OUTPATIENT)
Dept: FAMILY MEDICINE CLINIC | Facility: CLINIC | Age: 74
End: 2024-04-25
Payer: MEDICARE

## 2024-04-25 ENCOUNTER — TELEPHONE (OUTPATIENT)
Dept: FAMILY MEDICINE CLINIC | Facility: CLINIC | Age: 74
End: 2024-04-25
Payer: MEDICARE

## 2024-04-25 DIAGNOSIS — E87.6 HYPOKALEMIA: ICD-10-CM

## 2024-04-25 LAB — POTASSIUM SERPL-SCNC: 4.9 MMOL/L (ref 3.5–5.2)

## 2024-04-25 PROCEDURE — 36415 COLL VENOUS BLD VENIPUNCTURE: CPT

## 2024-04-25 PROCEDURE — 84132 ASSAY OF SERUM POTASSIUM: CPT | Performed by: NURSE PRACTITIONER

## 2024-04-29 DIAGNOSIS — I11.0 HYPERTENSIVE HEART DISEASE WITH HEART FAILURE: ICD-10-CM

## 2024-04-29 RX ORDER — LISINOPRIL 2.5 MG/1
2.5 TABLET ORAL DAILY
Qty: 90 TABLET | Refills: 1 | Status: SHIPPED | OUTPATIENT
Start: 2024-04-29

## 2024-04-29 RX ORDER — MAGNESIUM OXIDE TAB 400 MG (241.3 MG ELEMENTAL MG) 400 (241.3 MG) MG
1 TAB ORAL DAILY
Qty: 90 TABLET | Refills: 1 | Status: SHIPPED | OUTPATIENT
Start: 2024-04-29

## 2024-05-06 ENCOUNTER — CLINICAL SUPPORT (OUTPATIENT)
Dept: FAMILY MEDICINE CLINIC | Facility: CLINIC | Age: 74
End: 2024-05-06
Payer: MEDICARE

## 2024-05-06 DIAGNOSIS — E34.9 TESTOSTERONE DEFICIENCY: Primary | ICD-10-CM

## 2024-05-06 PROCEDURE — 96372 THER/PROPH/DIAG INJ SC/IM: CPT | Performed by: NURSE PRACTITIONER

## 2024-05-06 RX ADMIN — TESTOSTERONE CYPIONATE 200 MG: 200 INJECTION, SOLUTION INTRAMUSCULAR at 15:22

## 2024-05-13 RX ORDER — PANTOPRAZOLE SODIUM 40 MG/1
40 TABLET, DELAYED RELEASE ORAL DAILY
Qty: 90 TABLET | Refills: 1 | Status: SHIPPED | OUTPATIENT
Start: 2024-05-13

## 2024-05-13 RX ORDER — TAMSULOSIN HYDROCHLORIDE 0.4 MG/1
1 CAPSULE ORAL DAILY
Qty: 90 CAPSULE | Refills: 1 | Status: SHIPPED | OUTPATIENT
Start: 2024-05-13

## 2024-05-13 RX ORDER — POTASSIUM CHLORIDE 750 MG/1
20 TABLET, FILM COATED, EXTENDED RELEASE ORAL DAILY
Qty: 180 TABLET | Refills: 1 | Status: SHIPPED | OUTPATIENT
Start: 2024-05-13

## 2024-05-21 ENCOUNTER — CLINICAL SUPPORT (OUTPATIENT)
Dept: FAMILY MEDICINE CLINIC | Facility: CLINIC | Age: 74
End: 2024-05-21
Payer: MEDICARE

## 2024-05-21 DIAGNOSIS — E34.9 TESTOSTERONE DEFICIENCY: Primary | ICD-10-CM

## 2024-05-21 PROCEDURE — 96372 THER/PROPH/DIAG INJ SC/IM: CPT | Performed by: NURSE PRACTITIONER

## 2024-05-21 RX ADMIN — TESTOSTERONE CYPIONATE 200 MG: 200 INJECTION, SOLUTION INTRAMUSCULAR at 09:35

## 2024-06-04 ENCOUNTER — CLINICAL SUPPORT (OUTPATIENT)
Dept: FAMILY MEDICINE CLINIC | Facility: CLINIC | Age: 74
End: 2024-06-04
Payer: MEDICARE

## 2024-06-04 DIAGNOSIS — E34.9 TESTOSTERONE DEFICIENCY: Primary | ICD-10-CM

## 2024-06-04 PROCEDURE — 96372 THER/PROPH/DIAG INJ SC/IM: CPT | Performed by: NURSE PRACTITIONER

## 2024-06-04 RX ADMIN — TESTOSTERONE CYPIONATE 200 MG: 200 INJECTION, SOLUTION INTRAMUSCULAR at 09:19

## 2024-06-06 RX ORDER — ROSUVASTATIN CALCIUM 20 MG/1
20 TABLET, COATED ORAL DAILY
Qty: 90 TABLET | Refills: 1 | Status: SHIPPED | OUTPATIENT
Start: 2024-06-06

## 2024-06-18 ENCOUNTER — CLINICAL SUPPORT (OUTPATIENT)
Dept: FAMILY MEDICINE CLINIC | Facility: CLINIC | Age: 74
End: 2024-06-18
Payer: MEDICARE

## 2024-06-18 DIAGNOSIS — E34.9 TESTOSTERONE DEFICIENCY: Primary | ICD-10-CM

## 2024-06-18 PROCEDURE — 96372 THER/PROPH/DIAG INJ SC/IM: CPT | Performed by: NURSE PRACTITIONER

## 2024-06-18 RX ADMIN — TESTOSTERONE CYPIONATE 200 MG: 200 INJECTION, SOLUTION INTRAMUSCULAR at 09:48

## 2024-07-02 ENCOUNTER — CLINICAL SUPPORT (OUTPATIENT)
Dept: FAMILY MEDICINE CLINIC | Facility: CLINIC | Age: 74
End: 2024-07-02
Payer: MEDICARE

## 2024-07-02 DIAGNOSIS — E34.9 TESTOSTERONE DEFICIENCY: Primary | ICD-10-CM

## 2024-07-02 PROCEDURE — 96372 THER/PROPH/DIAG INJ SC/IM: CPT | Performed by: NURSE PRACTITIONER

## 2024-07-02 RX ADMIN — TESTOSTERONE CYPIONATE 200 MG: 200 INJECTION, SOLUTION INTRAMUSCULAR at 09:06

## 2024-07-08 RX ORDER — LEVOTHYROXINE SODIUM 0.1 MG/1
100 TABLET ORAL DAILY
Qty: 90 TABLET | Refills: 0 | Status: SHIPPED | OUTPATIENT
Start: 2024-07-08

## 2024-07-16 ENCOUNTER — CLINICAL SUPPORT (OUTPATIENT)
Dept: FAMILY MEDICINE CLINIC | Facility: CLINIC | Age: 74
End: 2024-07-16
Payer: MEDICARE

## 2024-07-16 DIAGNOSIS — E34.9 TESTOSTERONE DEFICIENCY: Primary | ICD-10-CM

## 2024-07-16 PROCEDURE — 96372 THER/PROPH/DIAG INJ SC/IM: CPT | Performed by: NURSE PRACTITIONER

## 2024-07-16 RX ADMIN — TESTOSTERONE CYPIONATE 200 MG: 200 INJECTION, SOLUTION INTRAMUSCULAR at 09:05

## 2024-07-30 ENCOUNTER — CLINICAL SUPPORT (OUTPATIENT)
Dept: FAMILY MEDICINE CLINIC | Facility: CLINIC | Age: 74
End: 2024-07-30
Payer: MEDICARE

## 2024-07-30 ENCOUNTER — TREATMENT (OUTPATIENT)
Dept: PHYSICAL THERAPY | Facility: CLINIC | Age: 74
End: 2024-07-30
Payer: MEDICARE

## 2024-07-30 DIAGNOSIS — Z47.89 ORTHOPEDIC AFTERCARE: Primary | ICD-10-CM

## 2024-07-30 DIAGNOSIS — M25.512 ACUTE PAIN OF LEFT SHOULDER: ICD-10-CM

## 2024-07-30 DIAGNOSIS — E34.9 TESTOSTERONE DEFICIENCY: Primary | ICD-10-CM

## 2024-07-30 DIAGNOSIS — R29.898 WEAKNESS OF LEFT UPPER EXTREMITY: ICD-10-CM

## 2024-07-30 PROCEDURE — 96372 THER/PROPH/DIAG INJ SC/IM: CPT | Performed by: NURSE PRACTITIONER

## 2024-07-30 RX ADMIN — TESTOSTERONE CYPIONATE 200 MG: 200 INJECTION, SOLUTION INTRAMUSCULAR at 09:25

## 2024-07-30 NOTE — PROGRESS NOTES
Physical Therapy Initial Evaluation and Plan of Care  [address] [place] [address] [place] [address]    Patient: Arik August   : 1950  Diagnosis/ICD-10 Code:  Orthopedic aftercare [Z47.89]  Referring practitioner: BOBBI Almanza  Date of Initial Visit: 2024  Today's Date: 2024  Patient seen for 1 sessions           Subjective Questionnaire: PT Functional Test: Quick DASH = 63% impairment      Subjective Evaluation    History of Present Illness  Mechanism of injury: Pt reports he had L reverse TSR on 2024. Pt states that ~1 mo after starting PT he ended up with a L scapular fx and had to stop therapy x6 wk and put back in sling. Pt is not sure how it happened. States he had a lot of pain during and after PT. Pt stopped PT ~2 wk ago due to wanting to go to different PT clinic. States he had skin infection at surgical site 2x. Completed 2 rounds of antibiotics and that was completed over a month ago. Pt is not able to reach overhead with L UE or into overhead cabinets. Difficulty with ADLs. Difficulty reaching back of head. Difficulty sleeping some nights. Having pain from L shoulder to neck. 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 Pt reports he has had a knot at front of shoulder for a couple days. Pt has compound cream that he uses on L shoulder that takes about 30 min to work and it does help. Diclofenac cream helps some. Takes oxycodone 4x/day and has been on this for years. Pt with extensive spinal fusion, C2-pelvis. Has N/T in B hands that he had prior to surgery. Has a tear in R shoulder but not bad enough to do surgery yet.     Pt had L superficial peroneal nerve and peroneus muscle biopsy in attempt to find cause of difficulty walking. Pt reports he is trying to get into [employer].      Patient Occupation: retired Quality of life: good    Pain  Current pain ratin  At best pain ratin  At worst pain ratin  Location: L shoulder  Quality:  discomfort and sharp  Relieving factors: medications and rest  Aggravating factors: outstretched reach, sleeping, overhead activity, movement and lifting  Progression: no change    Social Support  Lives in: one-story house  Lives with: spouse    Hand dominance: right    Treatments  Previous treatment: physical therapy  Patient Goals  Patient goals for therapy: decreased edema, decreased pain, improved balance, increased motion, increased strength, independence with ADLs/IADLs and return to sport/leisure activities           Objective          Observations     Additional Shoulder Observation Details  Gait with use of standard cane in R hand.     Palpation   Left   Hypertonic in the upper trapezius.   Tenderness of the upper trapezius.     Active Range of Motion   Left Shoulder   Flexion: 80 degrees with pain  Abduction: 80 degrees with pain  External rotation BTH: Active external rotation behind the head: to just behind L ear. with pain  Internal rotation BTB: Active internal rotation behind the back: to lateral edge of back pocket. with pain    Right Shoulder   Flexion: 145 degrees   Abduction: 140 degrees   External rotation BTH: C7   Internal rotation BTB: L4       Upon assessment of scar mobility, pt with large rounded and firm area at anterior shoulder. PT fears that shoulder replacement may be displaced. Further PT assessment stopped at that time.  PT called ortho office, informed MA of concerns. MA scheduled appt for Thurs, 8/1/2024 at 8:30 and pt in agreement with this. MA instructed for pt to go back into his sling until appt and PT informed pt of this. Pt verbalized understanding.    Pt voiced disappointed but thanked PT for calling MD and happy is able to get quickly.     Further assessment to be completed when able pending ortho appt outcome.      Assessment & Plan       Assessment  Impairments: abnormal coordination, abnormal muscle firing, abnormal muscle tone, abnormal or restricted ROM, activity  "intolerance, impaired physical strength, lacks appropriate home exercise program and pain with function   Functional limitations: carrying objects, lifting, sleeping, pulling, pushing, uncomfortable because of pain, moving in bed, reaching behind back, reaching overhead and unable to perform repetitive tasks   Assessment details: Pt is 73 yo male ~5 mo s/p L reverse TSR. Pt with post-op complications including \"skin\" infection x2 and L scapular fx noted at 3 mo post-op ortho appt. Pt is having difficulty with all functional use of L UE. Not able to reach overhead. Difficulty with all ADLs. PT suspects displacement of shoulder after AROM assessment and MD office notified. Will complete further assessment at future visits as able. Quick DASH indicates 63% impairment.    Patient presents with the impairments listed above and based on the objective findings and the physical therapy evaluation, the patient's condition has the potential to improve in response to therapy.   The patient's condition and/or services required are at a level of complexity that necessitates the skill & supervision of a physical therapist.    Prognosis: good    Goals  Plan Goals: STG to be met in 3 wk:  - PT to assess PROM next visit.  - PT to assess strength next visit pending ortho appt.  - Pt to report 50% decrease in L UT pain.  LTG to be met in 12 wk:  - Improve Quick DASH to 25% or less impairment.  - Pt to demonstrate L shoulder flex AROM of 140 deg or better in order to reach into overhead cabinets.  - Pt to demonstrate L shoulder strength of 4/5 or greater in all directions for improved ADLs.  - Pt to be independent with HEP.    Plan  Therapy options: will be seen for skilled therapy services  Planned modality interventions: electrical stimulation/Russian stimulation and thermotherapy (hydrocollator packs)  Other planned modality interventions: modalities as indicated  Planned therapy interventions: manual therapy, body mechanics " training, flexibility, functional ROM exercises, home exercise program, joint mobilization, postural training, soft tissue mobilization, spinal/joint mobilization, strengthening, stretching and therapeutic activities  Frequency: 2x week  Duration in weeks: 12  Treatment plan discussed with: patient        Timed:         Manual Therapy:    15     mins  32081;     Therapeutic Exercise:         mins  26827;     Neuromuscular Jessica:        mins  82871;    Therapeutic Activity:          mins  46008;     Gait Training:           mins  29288;     Ultrasound:          mins  99862;    Ionto                                   mins   44175  Self - Care                          mins  14022    Un-Timed:  Electrical Stimulation:         mins  04839 ( );  Dry Needling          20561/20560  Traction          mins 48716  Can Repos          mins 88417  Low Eval          Mins  53043  Mod Eval     30     Mins  43984  High Eval                            Mins  21422      Timed Treatment:   15   mins   Total Treatment:     45   mins      PT SIGNATURE: Sulema Richter PT, CLT  [place] Lic # [id number]  Electronically signed by Sulema Richter PT, 07/30/24, 2:47 PM EDT    Medicare Initial Certification  Certification Period: 7/30/2024 thru 10/27/2024  I certify that the therapy services are furnished while this patient is under my care.  The services outlined above are required by this patient, and will be reviewed every 90 days.     PHYSICIAN: Jr Gill PA _____________________________________________________  NPI: [id number]                                      DATE:    Please sign and return via fax to [phone]. Thank you, [facility].

## 2024-08-07 ENCOUNTER — TREATMENT (OUTPATIENT)
Dept: PHYSICAL THERAPY | Facility: CLINIC | Age: 74
End: 2024-08-07
Payer: MEDICARE

## 2024-08-07 DIAGNOSIS — Z47.89 ORTHOPEDIC AFTERCARE: Primary | ICD-10-CM

## 2024-08-07 DIAGNOSIS — R29.898 WEAKNESS OF LEFT UPPER EXTREMITY: ICD-10-CM

## 2024-08-07 DIAGNOSIS — M25.512 ACUTE PAIN OF LEFT SHOULDER: ICD-10-CM

## 2024-08-07 NOTE — PROGRESS NOTES
Physical Therapy Daily Treatment Note      Patient: Arik August   : 1950  Diagnosis/ICD-10 Code:  Orthopedic aftercare [Z47.89]   Problems Addressed this Visit    None  Visit Diagnoses       Orthopedic aftercare    -  Primary    Acute pain of left shoulder        Weakness of left upper extremity              Diagnoses         Codes Comments    Orthopedic aftercare    -  Primary ICD-10-CM: Z47.89  ICD-9-CM: V54.9     Acute pain of left shoulder     ICD-10-CM: M25.512  ICD-9-CM: 719.41     Weakness of left upper extremity     ICD-10-CM: R29.898  ICD-9-CM: 729.89           Referring practitioner: BOBBI Almanza  Date of Initial Visit: Type: THERAPY  Noted: 2024  Today's Date: 2024    VISIT#: 2    Subjective : Pt reports his shoulder is not infected but MD started him on antibiotic and said to watch for redness or any changes. Pt states they did aspirate the swollen area yesterday but it was just some clear blood-tinged liquid that came out. Pt reports that ortho said for him to avoid overhead movement.    Objective :   L shoulder abd and flex PROM to 90 deg with soft endfeel and no reports of pain.    Performed PROM and AAROM in flex and scaption to 90 deg and IR and ER in painfree ranges at ~30 deg abduction.    Instructed pt in sub-max isometrics in flex, abd, and ext in neutral. Pt with good ability during these exercises. Issued copy of exercises for HEP.    Raised area present at distal incision. Bruise noted from aspiration yesterday. No redness or warmth.  See Exercise, Manual, and Modality Logs for complete treatment.     Assessment/Plan : Good tolerance to PROM and AAROM. Cues to avoid L UT overactivation. Pt with good ability during sub-max isometrics. Will continue with AAROM and isometrics and progress to AROM as tolerated.     Goals  Plan Goals: STG to be met in 3 wk:  - PT to assess PROM next visit.  - PT to assess strength next visit pending ortho appt.  - Pt to report 50%  decrease in L UT pain.  LTG to be met in 12 wk:  - Improve Quick DASH to 25% or less impairment.  - Pt to demonstrate L shoulder flex AROM of 140 deg or better in order to reach into overhead cabinets.  - Pt to demonstrate L shoulder strength of 4/5 or greater in all directions for improved ADLs.  - Pt to be independent with HEP.    Progress per Plan of Care and Progress strengthening /stabilization /functional activity      Timed:         Manual Therapy:    20     mins  34645;     Therapeutic Exercise:    10     mins  13711;     Neuromuscular Jessica:        mins  46390;    Therapeutic Activity:          mins  66240;     Gait Training:           mins  90390;     Ultrasound:          mins  36555;    Ionto                                   mins   03272  Self Care                            mins   96261    Un-Timed:  Electrical Stimulation:    15     mins  11803 ( );  Dry Needling          mins 00449/78272  Traction          mins 25707  Canalith Repos                   mins  51803  Low Eval          Mins  99115  Mod Eval          Mins  36525  High Eval                            Mins  05323  Re-Eval                               mins  98012    Timed Treatment:   30   mins   Total Treatment:     45   mins    Sulema Richter PT, CLT, Cert DN  Physical Therapist  [place] Lic # [id number]

## 2024-08-09 ENCOUNTER — TREATMENT (OUTPATIENT)
Dept: PHYSICAL THERAPY | Facility: CLINIC | Age: 74
End: 2024-08-09
Payer: MEDICARE

## 2024-08-09 DIAGNOSIS — R29.898 WEAKNESS OF LEFT UPPER EXTREMITY: ICD-10-CM

## 2024-08-09 DIAGNOSIS — Z47.89 ORTHOPEDIC AFTERCARE: Primary | ICD-10-CM

## 2024-08-09 DIAGNOSIS — M25.512 ACUTE PAIN OF LEFT SHOULDER: ICD-10-CM

## 2024-08-09 NOTE — PROGRESS NOTES
Physical Therapy Daily Treatment Note      Patient: Arik August   : 1950  Diagnosis/ICD-10 Code:  Orthopedic aftercare [Z47.89]   Problems Addressed this Visit    None  Visit Diagnoses       Orthopedic aftercare    -  Primary    Acute pain of left shoulder        Weakness of left upper extremity              Diagnoses         Codes Comments    Orthopedic aftercare    -  Primary ICD-10-CM: Z47.89  ICD-9-CM: V54.9     Acute pain of left shoulder     ICD-10-CM: M25.512  ICD-9-CM: 719.41     Weakness of left upper extremity     ICD-10-CM: R29.898  ICD-9-CM: 729.89           Referring practitioner: BOBBI Almanza  Date of Initial Visit: Type: THERAPY  Noted: 2024  Today's Date: 2024    VISIT#: 3    Subjective : Pt states his shoulder is feeling good. Feels like knot is the same size but more firm. Pt states he had his wife shave his shoulder and upper back for taping today.    Objective : added bent over rows, bicep curls and resisted elbow ext this date. Issued copy of exercises and red Tband for HEP.    Flex PROM in suping to ~110 deg with no pain, soft endfeel.  See Exercise, Manual, and Modality Logs for complete treatment.     Assessment/Plan : Increased strength noted during sub-max isometrics this date. Attempted sidelying ER AROM but very difficult, then performed in supine with difficulty during eccentric control. Will continue with AAROM and isometrics and progress to AROM as tolerated. Assess response to kinesiotaping next visit.     Goals  Plan Goals: STG to be met in 3 wk:  - PT to assess PROM next visit.  - PT to assess strength next visit pending ortho appt.  - Pt to report 50% decrease in L UT pain.  LTG to be met in 12 wk:  - Improve Quick DASH to 25% or less impairment.  - Pt to demonstrate L shoulder flex AROM of 140 deg or better in order to reach into overhead cabinets.  - Pt to demonstrate L shoulder strength of 4/5 or greater in all directions for improved ADLs.  - Pt to be  independent with HEP.     Progress per Plan of Care and Progress strengthening /stabilization /functional activity      Timed:         Manual Therapy:    20     mins  89686;     Therapeutic Exercise:     10    mins  56406;     Neuromuscular Jessica:    8    mins  54691;    Therapeutic Activity:          mins  43147;     Gait Training:           mins  72329;     Ultrasound:          mins  88381;    Ionto                                   mins   83422  Self Care                            mins   88913    Un-Timed:  Electrical Stimulation:    15     mins  57191 ( );  Dry Needling          mins 48515/51205  Traction          mins 58952  Canalith Repos                   mins  05211  Low Eval          Mins  18254  Mod Eval          Mins  07769  High Eval                            Mins  70027  Re-Eval                               mins  94425    Timed Treatment:   38   mins   Total Treatment:     53   mins    Sulema Richter PT, CLT, Cert DN  Physical Therapist  [place] Lic # [id number]

## 2024-08-12 ENCOUNTER — TREATMENT (OUTPATIENT)
Dept: PHYSICAL THERAPY | Facility: CLINIC | Age: 74
End: 2024-08-12
Payer: MEDICARE

## 2024-08-12 DIAGNOSIS — Z47.89 ORTHOPEDIC AFTERCARE: Primary | ICD-10-CM

## 2024-08-12 DIAGNOSIS — M25.512 ACUTE PAIN OF LEFT SHOULDER: ICD-10-CM

## 2024-08-12 DIAGNOSIS — R29.898 WEAKNESS OF LEFT UPPER EXTREMITY: ICD-10-CM

## 2024-08-12 PROCEDURE — 97140 MANUAL THERAPY 1/> REGIONS: CPT | Performed by: PHYSICAL THERAPIST

## 2024-08-12 PROCEDURE — 97110 THERAPEUTIC EXERCISES: CPT | Performed by: PHYSICAL THERAPIST

## 2024-08-12 PROCEDURE — 97112 NEUROMUSCULAR REEDUCATION: CPT | Performed by: PHYSICAL THERAPIST

## 2024-08-12 PROCEDURE — G0283 ELEC STIM OTHER THAN WOUND: HCPCS | Performed by: PHYSICAL THERAPIST

## 2024-08-12 NOTE — PROGRESS NOTES
Physical Therapy Daily Treatment Note      Patient: Arik August   : 1950  Diagnosis/ICD-10 Code:  Orthopedic aftercare [Z47.89]   Problems Addressed this Visit    None  Visit Diagnoses       Orthopedic aftercare    -  Primary    Acute pain of left shoulder        Weakness of left upper extremity              Diagnoses         Codes Comments    Orthopedic aftercare    -  Primary ICD-10-CM: Z47.89  ICD-9-CM: V54.9     Acute pain of left shoulder     ICD-10-CM: M25.512  ICD-9-CM: 719.41     Weakness of left upper extremity     ICD-10-CM: R29.898  ICD-9-CM: 729.89           Referring practitioner: BOBBI Almanza  Date of Initial Visit: Type: THERAPY  Noted: 2024  Today's Date: 2024    VISIT#: 4    Subjective : Feels like taping helped. Pt reports his shoulder is feeling good this morning with no pain. States he did not feel good Fri and Sat and was in bed most of the day with chest pain, weakness, and not feeling good. Took nitro and chest pain when away. Just saw cardiologist last week and was given these instructions. [place] good yesterday and worked in his garage on a old tractor for several hours. Going to start painting tractor this week.     Objective : added supine AAROM press up and flex with dowel mitchel. Also added supine circles in 90 deg flex.  Kinesiotape still in place, PT removed. Skin intact and no signs of irritation. Reapplied kinesiotape at end of session.    See Exercise, Manual, and Modality Logs for complete treatment.     Assessment/Plan : No L shoulder pain at start of session. Good tolerance to ther ex progression with decreased control and coordination noted during shoulder circles.  Will continue with AAROM and isometrics and progress to AROM as tolerated. Assess response to kinesiotaping next visit.     Goals  Plan Goals: STG to be met in 3 wk:  - PT to assess PROM next visit.  - PT to assess strength next visit pending ortho appt.  - Pt to report 50% decrease in L UT  pain.  LTG to be met in 12 wk:  - Improve Quick DASH to 25% or less impairment.  - Pt to demonstrate L shoulder flex AROM of 140 deg or better in order to reach into overhead cabinets.  - Pt to demonstrate L shoulder strength of 4/5 or greater in all directions for improved ADLs.  - Pt to be independent with HEP.    Progress per Plan of Care and Progress strengthening /stabilization /functional activity      Timed:         Manual Therapy:    15     mins  76332;     Therapeutic Exercise:    15     mins  86281;     Neuromuscular Jessica:    8    mins  03807;    Therapeutic Activity:          mins  67960;     Gait Training:           mins  69232;     Ultrasound:          mins  80337;    Ionto                                   mins   87387  Self Care                            mins   35499    Un-Timed:  Electrical Stimulation:    15     mins  56309 ( );  Dry Needling          mins 87493/40047  Traction          mins 19513  Canalith Repos                   mins  74362  Low Eval          Mins  42813  Mod Eval          Mins  64156  High Eval                            Mins  63808  Re-Eval                               mins  44540    Timed Treatment:   38   mins   Total Treatment:     53   mins    Sulema Richter PT, CLT, Cert DN  Physical Therapist  [place] Lic # [id number]

## 2024-08-13 ENCOUNTER — CLINICAL SUPPORT (OUTPATIENT)
Dept: FAMILY MEDICINE CLINIC | Facility: CLINIC | Age: 74
End: 2024-08-13
Payer: MEDICARE

## 2024-08-13 DIAGNOSIS — E34.9 TESTOSTERONE DEFICIENCY: Primary | ICD-10-CM

## 2024-08-13 PROCEDURE — 96372 THER/PROPH/DIAG INJ SC/IM: CPT | Performed by: NURSE PRACTITIONER

## 2024-08-13 RX ADMIN — TESTOSTERONE CYPIONATE 200 MG: 200 INJECTION, SOLUTION INTRAMUSCULAR at 09:14

## 2024-08-14 ENCOUNTER — TREATMENT (OUTPATIENT)
Dept: PHYSICAL THERAPY | Facility: CLINIC | Age: 74
End: 2024-08-14
Payer: MEDICARE

## 2024-08-14 DIAGNOSIS — M25.512 ACUTE PAIN OF LEFT SHOULDER: ICD-10-CM

## 2024-08-14 DIAGNOSIS — R29.898 WEAKNESS OF LEFT UPPER EXTREMITY: ICD-10-CM

## 2024-08-14 DIAGNOSIS — Z47.89 ORTHOPEDIC AFTERCARE: Primary | ICD-10-CM

## 2024-08-14 NOTE — PROGRESS NOTES
Physical Therapy Daily Treatment Note      Patient: Arik August   : 1950  Diagnosis/ICD-10 Code:  Orthopedic aftercare [Z47.89]   Problems Addressed this Visit    None  Visit Diagnoses       Orthopedic aftercare    -  Primary    Acute pain of left shoulder        Weakness of left upper extremity              Diagnoses         Codes Comments    Orthopedic aftercare    -  Primary ICD-10-CM: Z47.89  ICD-9-CM: V54.9     Acute pain of left shoulder     ICD-10-CM: M25.512  ICD-9-CM: 719.41     Weakness of left upper extremity     ICD-10-CM: R29.898  ICD-9-CM: 729.89           Referring practitioner: BOBBI Almanza  Date of Initial Visit: Type: THERAPY  Noted: 2024  Today's Date: 2024    VISIT#: 5    Subjective : Christophe reports some muscle soreness after last visit but did not last long. States L shoulder is feeling ok today. States knot seems the same, firmer. He is supposed to call [facility] today.    Objective : Kinesiotape left in place.  Continued firmness of raised area under incision.    Progressed supine ER AROM to include 1#. Added lat pulls.    See Exercise, Manual, and Modality Logs for complete treatment.     Assessment/Plan : Good tolerance to ther ex progression. Improved control and coordination noted during shoulder circles this date.  Christophe is making steady strength gains. Will continue with AAROM and isometrics and progress to AROM as tolerated.      Goals  Plan Goals: STG to be met in 3 wk:  - PT to assess PROM next visit.  - PT to assess strength next visit pending ortho appt.  - Pt to report 50% decrease in L UT pain.  LTG to be met in 12 wk:  - Improve Quick DASH to 25% or less impairment.  - Pt to demonstrate L shoulder flex AROM of 140 deg or better in order to reach into overhead cabinets.  - Pt to demonstrate L shoulder strength of 4/5 or greater in all directions for improved ADLs.  - Pt to be independent with HEP.    Progress per Plan of Care and Progress strengthening  /stabilization /functional activity      Timed:         Manual Therapy:    15     mins  85307;     Therapeutic Exercise:    15     mins  14878;     Neuromuscular Jessica:    8    mins  75249;    Therapeutic Activity:          mins  61025;     Gait Training:           mins  22774;     Ultrasound:          mins  40326;    Ionto                                   mins   69773  Self Care                            mins   65230    Un-Timed:  Electrical Stimulation:    15     mins  15904 ( );  Dry Needling          mins 21560/50656  Traction          mins 65311  Canalith Repos                   mins  70246  Low Eval          Mins  95645  Mod Eval          Mins  06542  High Eval                            Mins  03423  Re-Eval                               mins  81645    Timed Treatment:   38   mins   Total Treatment:     53   mins    Sulema Richter, PT, CLT, Cert DN  Physical Therapist  [place] Lic # [id number]

## 2024-08-19 ENCOUNTER — TREATMENT (OUTPATIENT)
Dept: PHYSICAL THERAPY | Facility: CLINIC | Age: 74
End: 2024-08-19
Payer: MEDICARE

## 2024-08-19 DIAGNOSIS — Z47.89 ORTHOPEDIC AFTERCARE: Primary | ICD-10-CM

## 2024-08-19 DIAGNOSIS — R29.898 WEAKNESS OF LEFT UPPER EXTREMITY: ICD-10-CM

## 2024-08-19 DIAGNOSIS — M25.512 ACUTE PAIN OF LEFT SHOULDER: ICD-10-CM

## 2024-08-19 NOTE — PROGRESS NOTES
Physical Therapy Daily Treatment Note      Patient: Arik August   : 1950  Diagnosis/ICD-10 Code:  Orthopedic aftercare [Z47.89]   Problems Addressed this Visit    None  Visit Diagnoses       Orthopedic aftercare    -  Primary    Acute pain of left shoulder        Weakness of left upper extremity              Diagnoses         Codes Comments    Orthopedic aftercare    -  Primary ICD-10-CM: Z47.89  ICD-9-CM: V54.9     Acute pain of left shoulder     ICD-10-CM: M25.512  ICD-9-CM: 719.41     Weakness of left upper extremity     ICD-10-CM: R29.898  ICD-9-CM: 729.89           Referring practitioner: BOBBI Almanza  Date of Initial Visit: Type: THERAPY  Noted: 2024  Today's Date: 2024    VISIT#: 6    Subjective : Christophe reports that his L shoulder is sore. Painted a tractor over the weekend and used R arm mostly but had to use L hand for a short time and states hold pain sprayer was difficult (weighs ~3#). Just took tape off this morning. Wants to see how she does without taping today. Having R hip pain from sitting on stool and rolling around while painting also. His case is still in review at [facility].      Objective : held wt for supine press--up and flexion and just used dowel mitchel.     See Exercise, Manual, and Modality Logs for complete treatment.     Assessment/Plan : Knot under incision seemed to soften some with pressure. Increased L shoulder pain at start of session from work over the weekend. Good tolerance to ther ex. Christophe is making steady strength gains. Will continue with AAROM and isometrics and progress to AROM as tolerated.      Goals  Plan Goals: STG to be met in 3 wk:  - PT to assess PROM next visit.  - PT to assess strength next visit pending ortho appt.  - Pt to report 50% decrease in L UT pain.  LTG to be met in 12 wk:  - Improve Quick DASH to 25% or less impairment.  - Pt to demonstrate L shoulder flex AROM of 140 deg or better in order to reach into overhead cabinets.  - Pt to  demonstrate L shoulder strength of 4/5 or greater in all directions for improved ADLs.  - Pt to be independent with HEP.    Progress per Plan of Care and Progress strengthening /stabilization /functional activity         Timed:         Manual Therapy:    15     mins  66866;     Therapeutic Exercise:    15     mins  87871;     Neuromuscular Jessica:    8    mins  85039;    Therapeutic Activity:          mins  91636;     Gait Training:           mins  07120;     Ultrasound:          mins  63578;    Ionto                                   mins   49849  Self Care                            mins   51153    Un-Timed:  Electrical Stimulation:    15     mins  45762 ( );  Dry Needling          mins 19794/99586  Traction          mins 86319  Canalith Repos                   mins  61884  Low Eval          Mins  15663  Mod Eval          Mins  44710  High Eval                            Mins  15007  Re-Eval                               mins  44016    Timed Treatment:   38   mins   Total Treatment:     53   mins    Sulema Richter PT, CLT, Cert DN  Physical Therapist  [place] Lic # [id number]

## 2024-08-21 ENCOUNTER — TREATMENT (OUTPATIENT)
Dept: PHYSICAL THERAPY | Facility: CLINIC | Age: 74
End: 2024-08-21
Payer: MEDICARE

## 2024-08-21 DIAGNOSIS — R29.898 WEAKNESS OF LEFT UPPER EXTREMITY: ICD-10-CM

## 2024-08-21 DIAGNOSIS — M25.512 ACUTE PAIN OF LEFT SHOULDER: ICD-10-CM

## 2024-08-21 DIAGNOSIS — Z47.89 ORTHOPEDIC AFTERCARE: Primary | ICD-10-CM

## 2024-08-21 NOTE — PROGRESS NOTES
Physical Therapy Daily Treatment Note      Patient: Arik August   : 1950  Diagnosis/ICD-10 Code:  Orthopedic aftercare [Z47.89]   Problems Addressed this Visit    None  Visit Diagnoses       Orthopedic aftercare    -  Primary    Acute pain of left shoulder        Weakness of left upper extremity              Diagnoses         Codes Comments    Orthopedic aftercare    -  Primary ICD-10-CM: Z47.89  ICD-9-CM: V54.9     Acute pain of left shoulder     ICD-10-CM: M25.512  ICD-9-CM: 719.41     Weakness of left upper extremity     ICD-10-CM: R29.898  ICD-9-CM: 729.89           Referring practitioner: BOBBI Almanza  Date of Initial Visit: Type: THERAPY  Noted: 2024  Today's Date: 2024    VISIT#: 7    Subjective : Pt reports he felt good after last visit. Feels like pressure over enlarged area provided some relief. States he did well without having kinesiotape on. Sees ortho next Wed.      Objective          Palpation   Left   Tenderness of the levator scapulae and supraspinatus.     Tenderness     Left Shoulder   Tenderness in the medial scapula.     Active Range of Motion   Left Shoulder   Flexion: 80 degrees   Abduction: 80 degrees     Passive Range of Motion   Left Shoulder   Flexion: 135 degrees   Abduction: 145 degrees   External rotation 45°: 48 degrees   Internal rotation 45°: 68 degrees     Strength/Myotome Testing     Left Shoulder     Planes of Motion   Flexion: 3-   Abduction: 3-   External rotation at 0°: 3-   Internal rotation at 0°: 4-       See Exercise, Manual, and Modality Logs for complete treatment.     Assessment/Plan : Decreased pain at start of session. Continued tenderness at medial scap border and supraspinatus distal muscle belly. Raised area continues to be present under incision but with decreased firmness post-tx. PROM of L shoulder is progressing well with mild discomfort at end ranges. Good tolerance to gentle ther ex progression (isometrics, supine AAROM, and  periscpular strengthening). Will continue with ther ex progression as tolerated.      Goals  Plan Goals: STG to be met in 3 wk:  - PT to assess PROM next visit. - MET  - PT to assess strength next visit pending ortho appt. - MET  - Pt to report 50% decrease in L UT pain. - Progressing  LTG to be met in 12 wk:  - Improve Quick DASH to 25% or less impairment.  - Pt to demonstrate L shoulder flex AROM of 140 deg or better in order to reach into overhead cabinets.  - Pt to demonstrate L shoulder strength of 4/5 or greater in all directions for improved ADLs.  - Pt to be independent with HEP.    Progress per Plan of Care and Progress strengthening /stabilization /functional activity      Timed:         Manual Therapy:    20     mins  53578;     Therapeutic Exercise:    15     mins  88757;     Neuromuscular Jessica:    8    mins  13000;    Therapeutic Activity:          mins  98127;     Gait Training:           mins  18251;     Ultrasound:          mins  04948;    Ionto                                   mins   97943  Self Care                            mins   98026    Un-Timed:  Electrical Stimulation:         mins  54573 ( );  Dry Needling          mins 33872/90824  Traction          mins 35711  Canalith Repos                   mins  18206  Low Eval          Mins  59180  Mod Eval          Mins  60430  High Eval                            Mins  55870  Re-Eval                               mins  42752    Timed Treatment:   43   mins   Total Treatment:     43   mins    Sulema Richter PT, CLT, Cert DN  Physical Therapist  [place] Lic # [id number]

## 2024-08-27 ENCOUNTER — CLINICAL SUPPORT (OUTPATIENT)
Dept: FAMILY MEDICINE CLINIC | Facility: CLINIC | Age: 74
End: 2024-08-27
Payer: MEDICARE

## 2024-08-27 DIAGNOSIS — E34.9 TESTOSTERONE DEFICIENCY: Primary | ICD-10-CM

## 2024-08-27 PROCEDURE — 96372 THER/PROPH/DIAG INJ SC/IM: CPT | Performed by: NURSE PRACTITIONER

## 2024-08-27 RX ADMIN — TESTOSTERONE CYPIONATE 200 MG: 200 INJECTION, SOLUTION INTRAMUSCULAR at 09:19

## 2024-09-06 ENCOUNTER — READMISSION MANAGEMENT (OUTPATIENT)
Dept: CALL CENTER | Facility: HOSPITAL | Age: 74
End: 2024-09-06
Payer: MEDICARE

## 2024-09-06 NOTE — OUTREACH NOTE
Prep Survey      Flowsheet Row Responses   [facility] facility patient discharged from? Non-BH   Is LACE score < 7 ? 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 Total Joint Replacement   Does the patient have Home health ordered? Yes   Is there a DME ordered? No   Prep survey completed? Yes            MYESHA GLEZ - Registered Nurse

## 2024-09-09 ENCOUNTER — TRANSITIONAL CARE MANAGEMENT TELEPHONE ENCOUNTER (OUTPATIENT)
Dept: CALL CENTER | Facility: HOSPITAL | Age: 74
End: 2024-09-09
Payer: MEDICARE

## 2024-09-09 ENCOUNTER — TELEPHONE (OUTPATIENT)
Dept: FAMILY MEDICINE CLINIC | Facility: CLINIC | Age: 74
End: 2024-09-09
Payer: MEDICARE

## 2024-09-09 NOTE — OUTREACH NOTE
Call Center TCM Note      Flowsheet Row Responses   [facility] patient discharged from? Non-BH   Does the patient have one of the following disease processes/diagnoses(primary or secondary)? Other   TCM attempt successful? No   Unsuccessful attempts Attempt 1            Tonia Cooley MA    9/9/2024, 15:27 EDT

## 2024-09-09 NOTE — OUTREACH NOTE
Call Center TCM Note      Flowsheet Row Responses   [facility] patient discharged from? Non-  [[facility]]   Does the patient have one of the following disease processes/diagnoses(primary or secondary)? Other   TCM attempt successful? No   Unsuccessful attempts Attempt 2            Tonia Cooley MA    9/9/2024, 16:25 EDT

## 2024-09-10 ENCOUNTER — TRANSITIONAL CARE MANAGEMENT TELEPHONE ENCOUNTER (OUTPATIENT)
Dept: CALL CENTER | Facility: HOSPITAL | Age: 74
End: 2024-09-10
Payer: MEDICARE

## 2024-09-10 NOTE — OUTREACH NOTE
Call Center TCM Note      Flowsheet Row Responses   [facility] patient discharged from? Non-  [[facility]]   Does the patient have one of the following disease processes/diagnoses(primary or secondary)? Other   TCM attempt successful? No   Unsuccessful attempts Attempt 3   Change in Health Status Readmitted  [He has been readmitted to [facility] due to chest pain]            Ngozi Samaniego LPN    9/10/2024, 12:02 EDT

## 2024-09-11 ENCOUNTER — READMISSION MANAGEMENT (OUTPATIENT)
Dept: CALL CENTER | Facility: HOSPITAL | Age: 74
End: 2024-09-11
Payer: MEDICARE

## 2024-09-11 NOTE — OUTREACH NOTE
Prep Survey      Flowsheet Row Responses   [facility] facility patient discharged from? Non-BH   Is LACE score < 7 ? Non-BH Discharge   Eligibility [facility]   Date of Admission 09/09/24   Date of Discharge 09/11/24   Discharge Disposition Home or Self Care   Discharge diagnosis NSTEMI (non-ST elevated myocardial infarction)   Does the patient have one of the following disease processes/diagnoses(primary or secondary)? Acute MI (STEMI,NSTEMI)   Does the patient have Home health ordered? Yes   What is the Home health agency?  [facility]   Prep survey completed? Yes            Carine ESCAMILLA - Registered Nurse

## 2024-09-12 ENCOUNTER — TRANSITIONAL CARE MANAGEMENT TELEPHONE ENCOUNTER (OUTPATIENT)
Dept: CALL CENTER | Facility: HOSPITAL | Age: 74
End: 2024-09-12
Payer: MEDICARE

## 2024-09-12 NOTE — OUTREACH NOTE
Call Center TCM Note      Flowsheet Row Responses   [facility] patient discharged from? Non-  [[place]]   Does the patient have one of the following disease processes/diagnoses(primary or secondary)? Other   TCM attempt successful? Yes   Call start time 1551   Call end time 1601   Discharge diagnosis NSTEMI (non-ST elevated myocardial infarction)   Person spoke with today (if not patient) and relationship wife   Meds reviewed with patient/caregiver? Yes   Is the patient having any side effects they believe may be caused by any medication additions or changes? No   Does the patient have all medications ordered at discharge? Yes   Is the patient taking all medications as directed (includes completed medication regime)? Yes   Comments Scheduled a hospital followup with Mary Busby on 9/16/2024. He was hospitalized at [place]   Does the patient have an appointment with their PCP within 7-14 days of discharge? Yes   Psychosocial issues? No   Did the patient receive a copy of their discharge instructions? Yes   Nursing interventions Reviewed instructions with patient   What is the patient's perception of their health status since discharge? Improving   Is the patient/caregiver able to teach back signs and symptoms related to disease process for when to call PCP? Yes   Is the patient/caregiver able to teach back signs and symptoms related to disease process for when to call 911? Yes   Is the patient/caregiver able to teach back the hierarchy of who to call/visit for symptoms/problems? PCP, Specialist, Home health nurse, Urgent Care, ED, 911 Yes   If the patient is a current smoker, are they able to teach back resources for cessation? Not a smoker   TCM call completed? Yes   Call end time 1601   Would this patient benefit from a Referral to Amb Social Work? No   Is the patient interested in additional calls from an ambulatory ? No            Americo Pastor RN    9/12/2024, 16:01 EDT

## 2024-09-17 ENCOUNTER — READMISSION MANAGEMENT (OUTPATIENT)
Dept: CALL CENTER | Facility: HOSPITAL | Age: 74
End: 2024-09-17
Payer: MEDICARE

## 2024-09-18 ENCOUNTER — TRANSITIONAL CARE MANAGEMENT TELEPHONE ENCOUNTER (OUTPATIENT)
Dept: CALL CENTER | Facility: HOSPITAL | Age: 74
End: 2024-09-18
Payer: MEDICARE

## 2024-09-18 ENCOUNTER — TELEPHONE (OUTPATIENT)
Dept: FAMILY MEDICINE CLINIC | Facility: CLINIC | Age: 74
End: 2024-09-18

## 2024-09-18 NOTE — TELEPHONE ENCOUNTER
Patient is seeing Mary on Monday 9/23. It looks like there are reports from the hospital stay at [facility] under [facility]. You [facility] want to check and if it all isn't there, you can request them to be faxed before appt.

## 2024-09-18 NOTE — TELEPHONE ENCOUNTER
"  Caller: Arik August \"JOEY\"    Relationship to patient: Self    Best call back number: [phone]     New or established patient?  [] New  [x] Established    Date of discharge: 9/17/24    Facility discharged from: [facility]    Diagnosis/Symptoms:     Length of stay (If applicable): 9/14/24-9/17/24    Specialty Only: Did you see a [facility] provider?    [] Yes  [x] No  If so, who?         "

## 2024-09-23 ENCOUNTER — LAB (OUTPATIENT)
Dept: FAMILY MEDICINE CLINIC | Facility: CLINIC | Age: 74
End: 2024-09-23
Payer: MEDICARE

## 2024-09-23 ENCOUNTER — OFFICE VISIT (OUTPATIENT)
Dept: FAMILY MEDICINE CLINIC | Facility: CLINIC | Age: 74
End: 2024-09-23
Payer: MEDICARE

## 2024-09-23 VITALS
RESPIRATION RATE: 16 BRPM | HEIGHT: 72 IN | BODY MASS INDEX: 28.44 KG/M2 | SYSTOLIC BLOOD PRESSURE: 138 MMHG | WEIGHT: 210 LBS | OXYGEN SATURATION: 96 % | DIASTOLIC BLOOD PRESSURE: 80 MMHG | HEART RATE: 84 BPM

## 2024-09-23 DIAGNOSIS — N17.9 ACUTE KIDNEY INJURY: ICD-10-CM

## 2024-09-23 DIAGNOSIS — F41.9 ANXIETY: ICD-10-CM

## 2024-09-23 DIAGNOSIS — N40.1 BENIGN PROSTATIC HYPERPLASIA WITH URINARY HESITANCY: ICD-10-CM

## 2024-09-23 DIAGNOSIS — R39.11 BENIGN PROSTATIC HYPERPLASIA WITH URINARY HESITANCY: ICD-10-CM

## 2024-09-23 DIAGNOSIS — Z09 HOSPITAL DISCHARGE FOLLOW-UP: Primary | ICD-10-CM

## 2024-09-23 DIAGNOSIS — M62.838 MUSCLE SPASM: ICD-10-CM

## 2024-09-23 LAB
ANION GAP SERPL CALCULATED.3IONS-SCNC: 11.8 MMOL/L (ref 5–15)
BUN SERPL-MCNC: 22 MG/DL (ref 8–23)
BUN/CREAT SERPL: 10.9 (ref 7–25)
CALCIUM SPEC-SCNC: 9.2 MG/DL (ref 8.6–10.5)
CHLORIDE SERPL-SCNC: 102 MMOL/L (ref 98–107)
CO2 SERPL-SCNC: 24.2 MMOL/L (ref 22–29)
CREAT SERPL-MCNC: 2.01 MG/DL (ref 0.76–1.27)
EGFRCR SERPLBLD CKD-EPI 2021: 34.4 ML/MIN/1.73
GLUCOSE SERPL-MCNC: 94 MG/DL (ref 65–99)
POTASSIUM SERPL-SCNC: 4 MMOL/L (ref 3.5–5.2)
SODIUM SERPL-SCNC: 138 MMOL/L (ref 136–145)

## 2024-09-23 PROCEDURE — 80048 BASIC METABOLIC PNL TOTAL CA: CPT | Performed by: NURSE PRACTITIONER

## 2024-09-23 PROCEDURE — 36415 COLL VENOUS BLD VENIPUNCTURE: CPT

## 2024-09-23 RX ORDER — METOPROLOL SUCCINATE 25 MG/1
25 TABLET, EXTENDED RELEASE ORAL DAILY
COMMUNITY
Start: 2024-07-10 | End: 2024-09-23 | Stop reason: SDUPTHER

## 2024-09-23 RX ORDER — ISOSORBIDE MONONITRATE 30 MG/1
1 TABLET, EXTENDED RELEASE ORAL DAILY
COMMUNITY

## 2024-09-23 RX ORDER — DAPTOMYCIN 50 MG/ML
INJECTION, POWDER, LYOPHILIZED, FOR SOLUTION INTRAVENOUS
COMMUNITY
Start: 2024-09-17

## 2024-09-23 RX ORDER — DIPHENHYDRAMINE HCL 25 MG
25 CAPSULE ORAL
COMMUNITY

## 2024-09-23 RX ORDER — SULFAMETHOXAZOLE/TRIMETHOPRIM 800-160 MG
TABLET ORAL
COMMUNITY
End: 2024-09-23

## 2024-09-23 RX ORDER — AMOXICILLIN 250 MG
2 CAPSULE ORAL DAILY
COMMUNITY
Start: 2024-08-30

## 2024-09-23 RX ORDER — CEFAZOLIN SODIUM 10 G/1
INJECTION, POWDER, FOR SOLUTION INTRAVENOUS
COMMUNITY
Start: 2024-09-06 | End: 2024-09-23

## 2024-09-23 RX ORDER — SARILUMAB 200 MG/1.14ML
200 INJECTION, SOLUTION SUBCUTANEOUS
COMMUNITY
Start: 2024-09-06 | End: 2024-09-23

## 2024-09-23 RX ORDER — LISINOPRIL 10 MG/1
TABLET ORAL
COMMUNITY
End: 2024-09-23

## 2024-09-23 RX ORDER — TAMSULOSIN HYDROCHLORIDE 0.4 MG/1
2 CAPSULE ORAL DAILY
Qty: 180 CAPSULE | Refills: 1 | Status: SHIPPED | OUTPATIENT
Start: 2024-09-23

## 2024-09-23 RX ORDER — NABUMETONE 500 MG/1
500 TABLET, FILM COATED ORAL DAILY
COMMUNITY
End: 2024-09-23

## 2024-09-23 RX ORDER — METOLAZONE 2.5 MG/1
1 TABLET ORAL EVERY OTHER DAY
COMMUNITY
End: 2024-09-23

## 2024-09-23 RX ORDER — ONDANSETRON 4 MG/1
4 TABLET, FILM COATED ORAL
COMMUNITY
Start: 2024-08-30 | End: 2024-09-23

## 2024-09-23 RX ORDER — ISOSORBIDE MONONITRATE 30 MG/1
30 TABLET, EXTENDED RELEASE ORAL DAILY
COMMUNITY
Start: 2024-08-09

## 2024-09-23 RX ORDER — DEXAMETHASONE SODIUM PHOSPHATE 4 MG/ML
INJECTION, SOLUTION INTRA-ARTICULAR; INTRALESIONAL; INTRAMUSCULAR; INTRAVENOUS; SOFT TISSUE
COMMUNITY
Start: 2024-06-18 | End: 2024-09-23

## 2024-09-23 RX ORDER — MELATONIN 10 MG
10 CAPSULE ORAL DAILY
COMMUNITY

## 2024-09-23 RX ORDER — NITROGLYCERIN 0.3 MG/1
0.3 TABLET SUBLINGUAL
COMMUNITY

## 2024-09-23 RX ORDER — DIAZEPAM 5 MG
TABLET ORAL
COMMUNITY
End: 2024-09-23 | Stop reason: SDUPTHER

## 2024-09-23 RX ORDER — DIAZEPAM 5 MG
5 TABLET ORAL NIGHTLY PRN
Qty: 30 TABLET | Refills: 1 | Status: SHIPPED | OUTPATIENT
Start: 2024-09-23

## 2024-09-23 RX ADMIN — TESTOSTERONE CYPIONATE 200 MG: 200 INJECTION, SOLUTION INTRAMUSCULAR at 09:41

## 2024-10-02 ENCOUNTER — READMISSION MANAGEMENT (OUTPATIENT)
Dept: CALL CENTER | Facility: HOSPITAL | Age: 74
End: 2024-10-02
Payer: MEDICARE

## 2024-10-02 NOTE — OUTREACH NOTE
Prep Survey      Flowsheet Row Responses   [facility] facility patient discharged from? Non-BH   Is LACE score < 7 ? Non-BH Discharge   Eligibility [facility]   Date of Admission 09/27/24   Date of Discharge 10/02/24   Discharge Disposition Home or Self Care   Discharge diagnosis Infection of prosthetic joint, subsequent encounter, Acute CHF, left total shoulder arthroplasty on 9/25   Does the patient have one of the following disease processes/diagnoses(primary or secondary)? CHF   Does the patient have Home health ordered? No   Prep survey completed? Yes            Helen Peguero Registered Nurse

## 2024-10-03 ENCOUNTER — TRANSITIONAL CARE MANAGEMENT TELEPHONE ENCOUNTER (OUTPATIENT)
Dept: CALL CENTER | Facility: HOSPITAL | Age: 74
End: 2024-10-03
Payer: MEDICARE

## 2024-10-03 NOTE — OUTREACH NOTE
Call Center TCM Note      Flowsheet Row Responses   [facility] patient discharged from? Non-  [[facility]]   Does the patient have one of the following disease processes/diagnoses(primary or secondary)? Other   TCM attempt successful? Yes   Call start time 1310   Call end time 1314   Discharge diagnosis Infection of prosthetic joint, subsequent encounter, Acute CHF, left total shoulder arthroplasty on 9/25   Person spoke with today (if not patient) and relationship Patient   Meds reviewed with patient/caregiver? Yes   Is the patient having any side effects they believe may be caused by any medication additions or changes? No   Does the patient have all medications ordered at discharge? Yes   Is the patient taking all medications as directed (includes completed medication regime)? Yes   Comments PCP hospital f/u appt on 10/10/24 at 3:00 PM with ANTONIA Burks.   Does the patient have an appointment with their PCP within 7-14 days of discharge? Yes   Has home health visited the patient within 72 hours of discharge? N/A   Has all DME been delivered? No   Psychosocial issues? No   Comments Advised patient to monitor b/p and weight daily. Reviewed s/s of CHF and when to seek treatment. Patient states the shoulder feels fine.   Did the patient receive a copy of their discharge instructions? Yes   Nursing interventions Reviewed instructions with patient   What is the patient's perception of their health status since discharge? Improving   Is the patient/caregiver able to teach back signs and symptoms related to disease process for when to call PCP? Yes   Is the patient/caregiver able to teach back signs and symptoms related to disease process for when to call 911? Yes   Is the patient/caregiver able to teach back the hierarchy of who to call/visit for symptoms/problems? PCP, Specialist, Home health nurse, Urgent Care, ED, 911 Yes   If the patient is a current smoker, are they able to teach back  resources for cessation? Not a smoker   TCM call completed? Yes   Wrap up additional comments Patient denies any needs or concerns at this time.  PCP [facility] f/u appt on 10/10/24 at 3:00 PM with ANTONIA Burks.   Call end time 1314   Would this patient benefit from a Referral to [facility] Social Work? No   Is the patient interested in additional calls from an ambulatory ? No            Arti Watkins RN    10/3/2024, 13:14 EDT

## 2024-10-04 ENCOUNTER — TELEPHONE (OUTPATIENT)
Dept: FAMILY MEDICINE CLINIC | Facility: CLINIC | Age: 74
End: 2024-10-04
Payer: MEDICARE

## 2024-10-04 RX ORDER — LEVOTHYROXINE SODIUM 100 UG/1
100 TABLET ORAL DAILY
Qty: 90 TABLET | Refills: 0 | Status: SHIPPED | OUTPATIENT
Start: 2024-10-04

## 2024-10-04 NOTE — TELEPHONE ENCOUNTER
Hub staff attempted to follow warm transfer process and was unsuccessful     Caller: SHELLI ISSA    Relationship to patient: Emergency Contact    Best call back number: [phone]     Patient is needing: PATIENTS WIFE IS REQUESTING TO RESCHEDULE PATIENTS UPCOMING NURSE VISIT FROM 10/8/24 TO 10/1024, THE SAME DAY HE SEES HIS PROVIDER    PLEASE ADVISE

## 2024-10-04 NOTE — TELEPHONE ENCOUNTER
Spoke with patient's son Montana (on HIPAA) and told him that patient can get his testosterone shot when he is here on 10/10/2024 to see Mary. I cancelled the [facility] [place] on 10/8/2024. He voiced understanding.

## 2024-10-10 ENCOUNTER — OFFICE VISIT (OUTPATIENT)
Dept: FAMILY MEDICINE CLINIC | Facility: CLINIC | Age: 74
End: 2024-10-10
Payer: MEDICARE

## 2024-10-10 VITALS
OXYGEN SATURATION: 96 % | DIASTOLIC BLOOD PRESSURE: 69 MMHG | HEART RATE: 89 BPM | HEIGHT: 72 IN | BODY MASS INDEX: 28.47 KG/M2 | SYSTOLIC BLOOD PRESSURE: 109 MMHG | RESPIRATION RATE: 18 BRPM

## 2024-10-10 DIAGNOSIS — Z09 HOSPITAL DISCHARGE FOLLOW-UP: Primary | ICD-10-CM

## 2024-10-10 DIAGNOSIS — E34.9 TESTOSTERONE DEFICIENCY: ICD-10-CM

## 2024-10-10 DIAGNOSIS — I25.5 ISCHEMIC CARDIOMYOPATHY: ICD-10-CM

## 2024-10-10 DIAGNOSIS — J96.01 ACUTE RESPIRATORY FAILURE WITH HYPOXIA: ICD-10-CM

## 2024-10-10 DIAGNOSIS — I50.33 ACUTE ON CHRONIC DIASTOLIC CHF (CONGESTIVE HEART FAILURE): ICD-10-CM

## 2024-10-10 RX ORDER — BUMETANIDE 2 MG/1
2 TABLET ORAL DAILY
COMMUNITY

## 2024-10-10 RX ORDER — POTASSIUM CHLORIDE 750 MG/1
10 CAPSULE, EXTENDED RELEASE ORAL 2 TIMES DAILY
COMMUNITY

## 2024-10-10 RX ADMIN — TESTOSTERONE CYPIONATE 200 MG: 200 INJECTION, SOLUTION INTRAMUSCULAR at 15:25

## 2024-10-10 NOTE — PROGRESS NOTES
"Transitional Care Follow Up Visit  Subjective     Arik August is a 74 y.o. male who presents for a transitional care management visit.    Within 48 business hours after discharge our office contacted him via telephone to coordinate his care and needs.      I reviewed and discussed the details of that call along with the discharge summary, hospital problems, inpatient lab results, inpatient diagnostic studies, and consultation reports with Arik.     Current outpatient and discharge medications have been reconciled for the patient.  Reviewed by: ANTONIA Tapia          10/2/2024     7:03 PM   Date of TCM Phone Call   [facility]   Date of Admission 9/27/2024   Date of Discharge 10/2/2024   Discharge Disposition Home or Self Care     Risk for Readmission (LACE) No data recorded    History of Present Illness   Course During Hospital Stay:  pt comes in today for follow up from recent hospital.   Went to ER on 9/27 with c/o increased edema and SOA.   Diagnosed with acute CHF exacerbation and questionable PNA.   Was recently in after having prosthetic infection s/p reverse shoulder.   Has been on anbx for that.   Also with significant hx of CAD, DM, RA, CKD, and non ischemic myocardial injury  9/29 had echo and shouwed EF 45%  Was given IV bumex and then transitioned to oral.  Was placed on augmentin, and then switched to doxy  Was discharged on 3-4L O2, but currently on 1.5L.   Has upcoming appt with PNA  Swelling is improved  Feeling better.   Breathing is better   He is currently taking 2mg bumex daily        The following portions of the patient's history were reviewed and updated as appropriate: allergies, current medications, past family history, past medical history, past social history, past surgical history, and problem list.    Review of Systems    Objective   /69   Pulse 89   Resp 18   Ht 182.9 cm (72.01\")   SpO2 96%   BMI 28.47 kg/m²   Physical Exam  Constitutional:       " Appearance: He is well-developed.   Eyes:      Pupils: Pupils are equal, round, and reactive to light.   Cardiovascular:      Rate and Rhythm: Normal rate and regular rhythm.   Pulmonary:      Effort: Pulmonary effort is normal.      Breath sounds: Normal breath sounds.   Neurological:      Mental Status: He is alert and oriented to person, place, and time.     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  The mitchel has come loose from the pelvic fixation on the right.  This is stable compared to previous imaging.       Assessment & Plan   Diagnoses and all orders for this visit:    1. Hospital discharge follow-up (Primary)    2. Ischemic cardiomyopathy    3. Acute on chronic diastolic CHF (congestive heart failure)    4. Acute respiratory failure with hypoxia    5. Testosterone deficiency      Reviewed hospital records  Follow up with pulm  Follow up with cardiology  Follow up with nephrology   Cont with bumex   Monitor weights  Sodium restriction   During this office visit, we discussed the pertinent aspects of the visit and treatment recommendations. Pt verbalizes understanding. Follow up was discussed. Patient was given the opportunity to ask questions and discuss other concerns.

## 2024-10-22 ENCOUNTER — CLINICAL SUPPORT (OUTPATIENT)
Dept: FAMILY MEDICINE CLINIC | Facility: CLINIC | Age: 74
End: 2024-10-22
Payer: MEDICARE

## 2024-10-22 DIAGNOSIS — E34.9 TESTOSTERONE DEFICIENCY: Primary | ICD-10-CM

## 2024-10-22 DIAGNOSIS — Z23 NEED FOR INFLUENZA VACCINATION: ICD-10-CM

## 2024-10-22 PROCEDURE — 96372 THER/PROPH/DIAG INJ SC/IM: CPT | Performed by: NURSE PRACTITIONER

## 2024-10-22 PROCEDURE — 90662 IIV NO PRSV INCREASED AG IM: CPT | Performed by: NURSE PRACTITIONER

## 2024-10-22 PROCEDURE — G0008 ADMIN INFLUENZA VIRUS VAC: HCPCS | Performed by: NURSE PRACTITIONER

## 2024-10-22 RX ADMIN — TESTOSTERONE CYPIONATE 200 MG: 200 INJECTION, SOLUTION INTRAMUSCULAR at 10:45

## 2024-10-27 DIAGNOSIS — I11.0 HYPERTENSIVE HEART DISEASE WITH HEART FAILURE: ICD-10-CM

## 2024-10-28 ENCOUNTER — TRANSCRIBE ORDERS (OUTPATIENT)
Dept: PHYSICAL THERAPY | Facility: CLINIC | Age: 74
End: 2024-10-28
Payer: MEDICARE

## 2024-10-28 DIAGNOSIS — M25.619 LIMITED RANGE OF MOTION (ROM) OF SHOULDER: Primary | ICD-10-CM
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
## 2024-10-28 RX ORDER — LISINOPRIL 2.5 MG/1
2.5 TABLET ORAL DAILY
Qty: 90 TABLET | Refills: 1 | OUTPATIENT
Start: 2024-10-28

## 2024-10-31 RX ORDER — BUMETANIDE 2 MG/1
2 TABLET ORAL 2 TIMES DAILY PRN
Qty: 180 TABLET | Refills: 0 | Status: SHIPPED | OUTPATIENT
Start: 2024-10-31

## 2024-11-05 ENCOUNTER — CLINICAL SUPPORT (OUTPATIENT)
Dept: FAMILY MEDICINE CLINIC | Facility: CLINIC | Age: 74
End: 2024-11-05
Payer: MEDICARE

## 2024-11-05 DIAGNOSIS — E34.9 TESTOSTERONE DEFICIENCY: Primary | ICD-10-CM

## 2024-11-05 PROCEDURE — 96372 THER/PROPH/DIAG INJ SC/IM: CPT | Performed by: NURSE PRACTITIONER

## 2024-11-05 RX ORDER — POTASSIUM CHLORIDE 750 MG/1
20 TABLET, EXTENDED RELEASE ORAL DAILY
Qty: 180 TABLET | Refills: 1 | Status: SHIPPED | OUTPATIENT
Start: 2024-11-05

## 2024-11-05 RX ORDER — PANTOPRAZOLE SODIUM 40 MG/1
40 TABLET, DELAYED RELEASE ORAL DAILY
Qty: 90 TABLET | Refills: 1 | Status: SHIPPED | OUTPATIENT
Start: 2024-11-05

## 2024-11-05 RX ADMIN — TESTOSTERONE CYPIONATE 200 MG: 200 INJECTION, SOLUTION INTRAMUSCULAR at 11:07

## 2024-11-06 ENCOUNTER — TREATMENT (OUTPATIENT)
Dept: PHYSICAL THERAPY | Facility: CLINIC | Age: 74
End: 2024-11-06
Payer: MEDICARE

## 2024-11-06 DIAGNOSIS — Z47.89 ORTHOPEDIC AFTERCARE: Primary | ICD-10-CM

## 2024-11-06 DIAGNOSIS — R29.898 WEAKNESS OF LEFT SHOULDER: ICD-10-CM

## 2024-11-06 DIAGNOSIS — R53.1 GENERAL WEAKNESS: ICD-10-CM

## 2024-11-06 DIAGNOSIS — R26.9 GAIT DISTURBANCE: ICD-10-CM

## 2024-11-06 DIAGNOSIS — R26.81 UNSTEADINESS ON FEET: ICD-10-CM

## 2024-11-06 NOTE — PROGRESS NOTES
Physical Therapy Initial Evaluation and Plan of Care  [address] [place] [address] [place] [address]    Patient: Arik August   : 1950  Diagnosis/ICD-10 Code:  Orthopedic aftercare [Z47.89]  Referring practitioner: BOBBI Almanza  Date of Initial Visit: 2024  Today's Date: 2024  Patient seen for 1 sessions           Subjective Questionnaire: PT Functional Test: Quick DASH = 73% impairment      Subjective Evaluation    History of Present Illness  Mechanism of injury: Pt underwent reverse L TSA on 2024. Then developed an infection and did 2 rounds of oral antibiotics. He developed large knot at front of shoulder. Had aspiration of L shoulder that came back negative for infection. Then was scheduled to have prosthesis removed due to pain. On , large knot ruptured and was draining. On 2024, underwent removal of prosthesis and placement of spacer.   Following that surgery, pt had following hospitalizations:   - 2024 due to cardiac issues related to stent closing up and had new stent placed on 9/10/2024 and was D/C home the next day.   - 2024 with acute renal failure (2-3 day stay)  - 2024 with pneumonia and acute CHF (5 day stay).   Pt was D/C home on O2 continuous x1-1.5 wk. Has only been using O2 at night now. Pt was d/c from home health yesterday.  Pt presents to out-PT now for continued ROM and gentle strengthening of L shoulder to maintain as much as function as possible and to address general weakness and balance deficits so that pt will be able to start cardiac rehab.     L shoulder: he is able to use L UE to assist during ADLs and can lift it a little bit. Uses long handle sponge for showering. Using shower seat and he did that previously also due to balance. Able to dress self without any aids but is difficult. Wife has to put compression socks on due to LE swelling. Difficulty getting a coat on. Not able to drive. Not having any pain in L shoulder unless he  does too much. Pain with reaching out to the side. Has chronic pain in B hands and R elbow related to RA. He is currently off RA due to kidney.    General mobility: States his balance is really bad. Has been using a w/c sometimes in the house and at all times outside of his home. Was able to walk in the community with rollator for the first time 5 days ago when he went to a  home. He has had several trips out of his home since then using his walker. He is wearing out fast when walking and have to sit down on rollator to take breaks. Difficulty with sit to stand transfers and sometimes takes a couple tries to get up. Able to get in/out of bed fine. Has a lot of trouble walking on grass. States L ankle is very weak.    Pt's case was being looked at by [facility] in regards to balance issues and weakness but he was denied. Rheumatologist has told him that when he gets stronger, she is going to try to get him into [place] or [facility] or reapply to [facility].    PLOF: ambulated with standard cane, able to drive and go out in the community on his own.        Patient Occupation: retired   Precautions and Work Restrictions: 2# lifting restriction L UEQuality of life: good    Pain  Current pain ratin  At best pain ratin  At worst pain ratin  Location: L shoulder  Quality: dull ache and sharp  Relieving factors: rest  Aggravating factors: lifting and movement  Progression: no change    Social Support  Lives in: one-story house (2 steps to enter home with rail.)  Lives with: spouse    Hand dominance: right    Treatments  Discharged from (in last 30 days): home health care  Patient Goals  Patient goals for therapy: decreased pain, improved balance, increased motion, independence with ADLs/IADLs and increased strength  Patient goal: be able to start cardiac rehab         Objective          Active Range of Motion   Left Shoulder   Flexion: 90 degrees   Abduction: 50 degrees     Additional Active  Range of Motion Details  AROM assessed in sitting.    Passive Range of Motion   Left Shoulder   Flexion: 130 degrees   Abduction: 150 degrees   External rotation 45°: 70 degrees   Internal rotation 45°: 80 degrees     Strength/Myotome Testing     Left Shoulder     Planes of Motion   Flexion: 3-   Abduction: 2+   External rotation at 0°: 2   Internal rotation at 0°: 3     Left Hip   Planes of Motion   Flexion: 3+  Extension: 4    Right Hip   Planes of Motion   Flexion: 4-  Extension: 4    Left Knee   Flexion: 4  Extension: 4    Right Knee   Flexion: 4  Extension: 4    Left Ankle/Foot   Dorsiflexion: 2  Plantar flexion: 2+  Inversion: 3  Eversion: 2    Right Ankle/Foot   Dorsiflexion: 3-  Plantar flexion: 3-  Inversion: 4-  Eversion: 4-     Gait: using rollator, decreased ildefonso, good step length, B foot drop noted with L significantly more prominent, compensating with increased L hip flexion during swing phase, foot flat contact B.     30 sec STS = 9 reps with use of B UE. C/o of B LE fatigue and legs exhausted   TUG = 21 sec with rollator  6 min walk test = 538 ft with rollator  4 m gait speed test = 2.45 m/sec with rollator (9.8 sec)    Rhomberg stance = 30 sec  Tandem stance = 2 sec with each foot in back    Assessment & Plan       Assessment  Impairments: abnormal coordination, abnormal gait, abnormal muscle tone, abnormal or restricted ROM, activity intolerance, impaired balance, impaired physical strength, lacks appropriate home exercise program and safety issue   Functional limitations: carrying objects, lifting, walking, pulling, pushing, moving in bed, standing, reaching behind back, reaching overhead and unable to perform repetitive tasks   Assessment details: Pt is 73 yo male referred to PT s/p removal of L reverse TSA prosthesis and placement of spacer. Pt with subsequent hospitalization due to heart, kidney, and respiratory issues. L shoulder pain has improved significantly following removal of  prosthesis. Pt presents with weakness of L shoulder and PROM is WFL. Pt with weakness of B LE and decreased functional activity tolerance. Pt is at high fall risk. He is have difficulty with reaching in front and to the side. Difficulty with transfers. Difficulty with all ambulation with B foot drop and has just started ambulating in community this wk vs using w/c. Pt will benefit from PT to increase L shoulder strength to improve functional use of L UE, for balance training to decrease fall risk, and general strengthening so that pt has tolerance to start cardiac rehab. Quick DASH indicates 73% impairment.    Patient presents with the impairments listed above and based on the objective findings and the physical therapy evaluation, the patient's condition has the potential to improve in response to therapy.   The patient's condition and/or services required are at a level of complexity that necessitates the skill & supervision of a physical therapist.    Prognosis: good    Goals  Plan Goals: STG to be met in 7 wk:  - Increase L shoulder flex AROM to 100 deg.  - Pt to transfers sit to stand x5 reps from chair without use of B UE for ease getting up from all chairs.  - Increase L ankle DF and eversion to 3-/5 for improved stability walking on uneven surfaces.  LTG to be met in 12 wk:  - Improve Quick DASH to 40% or less impairment.  - Increase L shoulder flex AROM to 110 deg for improved over head use of L UE.  - Improve TUG to 15 sec with use of standard cane for safe community ambulation.  - Increase L shoulder strength to 3+/5 or better in available range for ability to perform household tasks.  - Improve 30 sec STS to 11 reps without use of hands for ease getting up from all surfaces.  - Pt to be independent with HEP.    Plan  Therapy options: will be seen for skilled therapy services  Planned modality interventions: electrical stimulation/Russian stimulation and thermotherapy (hydrocollator packs)  Other planned  modality interventions: modalities as indicated  Planned therapy interventions: manual therapy, body mechanics training, flexibility, functional ROM exercises, home exercise program, joint mobilization, postural training, soft tissue mobilization, spinal/joint mobilization, strengthening, stretching, therapeutic activities, neuromuscular re-education, abdominal trunk stabilization, gait training, transfer training and balance/weight-bearing training  Frequency: 2x week  Duration in weeks: 12  Treatment plan discussed with: patient        Timed:         Manual Therapy:         mins  91476;     Therapeutic Exercise:    10     mins  12769;     Neuromuscular Jessica:        mins  43779;    Therapeutic Activity:          mins  61796;     Gait Training:           mins  08053;     Ultrasound:          mins  43785;    Ionto                                   mins   30989  Self - Care                     15     mins  12353    Un-Timed:  Electrical Stimulation:         mins  97006 ( );  Dry Needling          20561/20560  Traction          mins 47491  Can Repos          mins 61068  Low Eval          Mins  24225  Mod Eval          Mins  33328  High Eval                       45     Mins  85913      Timed Treatment:   25   mins   Total Treatment:     70   mins      PT SIGNATURE: Sulema Richter PT, CLT  [place] Lic # [id number]  Electronically signed by Sulema Richter PT, 11/06/24, 9:31 AM EST    Medicare Initial Certification  Certification Period: 11/6/2024 thru 2/3/2025  I certify that the therapy services are furnished while this patient is under my care.  The services outlined above are required by this patient, and will be reviewed every 90 days.     PHYSICIAN: Jr Gill PA _____________________________________________________  NPI: [id number]                                      DATE:    Please sign and return via fax to [phone]. Thank you, [facility].

## 2024-11-08 ENCOUNTER — TREATMENT (OUTPATIENT)
Dept: PHYSICAL THERAPY | Facility: CLINIC | Age: 74
End: 2024-11-08
Payer: MEDICARE

## 2024-11-08 DIAGNOSIS — R29.898 WEAKNESS OF LEFT SHOULDER: ICD-10-CM

## 2024-11-08 DIAGNOSIS — R26.9 GAIT DISTURBANCE: ICD-10-CM

## 2024-11-08 DIAGNOSIS — R53.1 GENERAL WEAKNESS: ICD-10-CM

## 2024-11-08 DIAGNOSIS — Z47.89 ORTHOPEDIC AFTERCARE: Primary | ICD-10-CM

## 2024-11-08 DIAGNOSIS — R26.81 UNSTEADINESS ON FEET: ICD-10-CM

## 2024-11-08 NOTE — PROGRESS NOTES
Physical Therapy Daily Treatment Note  [address], [address], [place]  [address]    Patient: Arik August   : 1950  Diagnosis/ICD-10 Code:  Orthopedic aftercare [Z47.89]   Problems Addressed this Visit    None  Visit Diagnoses       Orthopedic aftercare    -  Primary    Weakness of left shoulder        General weakness        Gait disturbance        Unsteadiness on feet              Diagnoses         Codes Comments    Orthopedic aftercare    -  Primary ICD-10-CM: Z47.89  ICD-9-CM: V54.9     Weakness of left shoulder     ICD-10-CM: R29.898  ICD-9-CM: 781.99     General weakness     ICD-10-CM: R53.1  ICD-9-CM: 780.79     Gait disturbance     ICD-10-CM: R26.9  ICD-9-CM: 781.2     Unsteadiness on feet     ICD-10-CM: R26.81  ICD-9-CM: 781.2           Referring practitioner: No ref. provider found  Date of Initial Visit: Type: THERAPY  Noted: 2024  Today's Date: 2024    VISIT#: 2    Subjective   Christophe reports he has been trying to walk a bit at home without walker, just a few steps at a time. He notes he saw cardio yesterday who advised him to lose 10#. L shoulder only bothersome with reaching.     Objective     See Exercise, Manual, and Modality Logs for complete treatment.     Assessment/Plan  Christophe reported Nustep felt good for L UE. He had no LOB. He exhibits L UE weakness and required min A for L shoulder rotation in supine. He was cued to do mirroring exercises with RUE to promote neural connectivity to LUE. Will cont to promote L UE strength (2# limit), activity tolerance, functional mobility, and balance.   Progress per Plan of Care         Timed:         Manual Therapy:         mins  98657;     Therapeutic Exercise:    10     mins  01646;     Neuromuscular Jessica:        mins  39460;    Therapeutic Activity:     15     mins  28152;     Gait Training:      15     mins  66716;     Ultrasound:          mins  91051;    Ionto                                   mins   75648  Self Care                             mins   98936  Tests & Measures              mins   77029  Macanese stim                    mins   29698    Un-Timed:  Canalith Repos                   mins  68581  Electrical Stimulation:         mins  73024 ( );  Dry Needling          mins 73039/85115  Traction          mins 18004  Low Eval          Mins  76403  Mod Eval          Mins  45852  High Eval                            Mins  49311  Re-Eval                               mins  87525    Timed Treatment:   40   mins   Total Treatment:     40   mins    Gabriela Lovelace, PT, DPT, cert. DN  Physical Therapist  [place] Lic # [id number]

## 2024-11-12 ENCOUNTER — TREATMENT (OUTPATIENT)
Dept: PHYSICAL THERAPY | Facility: CLINIC | Age: 74
End: 2024-11-12
Payer: MEDICARE

## 2024-11-12 DIAGNOSIS — R26.9 GAIT DISTURBANCE: ICD-10-CM

## 2024-11-12 DIAGNOSIS — Z47.89 ORTHOPEDIC AFTERCARE: Primary | ICD-10-CM

## 2024-11-12 DIAGNOSIS — R26.81 UNSTEADINESS ON FEET: ICD-10-CM

## 2024-11-12 DIAGNOSIS — R53.1 GENERAL WEAKNESS: ICD-10-CM

## 2024-11-12 DIAGNOSIS — R29.898 WEAKNESS OF LEFT SHOULDER: ICD-10-CM

## 2024-11-12 NOTE — PROGRESS NOTES
Physical Therapy Daily Treatment Note  [address], [address], [place]  [address]    Patient: Arik August   : 1950  Diagnosis/ICD-10 Code:  Orthopedic aftercare [Z47.89]   Problems Addressed this Visit    None  Visit Diagnoses       Orthopedic aftercare    -  Primary    Weakness of left shoulder        General weakness        Gait disturbance        Unsteadiness on feet              Diagnoses         Codes Comments    Orthopedic aftercare    -  Primary ICD-10-CM: Z47.89  ICD-9-CM: V54.9     Weakness of left shoulder     ICD-10-CM: R29.898  ICD-9-CM: 781.99     General weakness     ICD-10-CM: R53.1  ICD-9-CM: 780.79     Gait disturbance     ICD-10-CM: R26.9  ICD-9-CM: 781.2     Unsteadiness on feet     ICD-10-CM: R26.81  ICD-9-CM: 781.2           Referring practitioner: BOBBI Almanza  Date of Initial Visit: Type: THERAPY  Noted: 2024  Today's Date: 2024    VISIT#: 3    Subjective   Christophe reports he felt okay after last session, just tired. He has been trying to walk at home without AD and feels unsteady and weak. No falls nor close calls though.     Objective     See Exercise, Manual, and Modality Logs for complete treatment.     L shoulder active elevation in sittin deg    Assessment/Plan  Christophe noted difficulty with cone over ball gross motor challenge (L more difficult than R) d/t L shoulder weakness. He cont to benefit from functional strength and balance training in addition to L UE ROM & strength.   Progress strengthening /stabilization /functional activity         Timed:         Manual Therapy:         mins  33967;     Therapeutic Exercise:    15     mins  87156;     Neuromuscular Jessica:    15    mins  32058;    Therapeutic Activity:     15     mins  40345;     Gait Training:      10     mins  84967;     Ultrasound:          mins  86774;    Ionto                                   mins   01734  Self Care                            mins   42259  Tests & Measures               mins   17358  Norwegian stim                    mins   17574    Un-Timed:  Canalith Repos                   mins  97419  Electrical Stimulation:         mins  70941 ( );  Dry Needling          mins 55369/39636  Traction          mins 56901  Low Eval          Mins  01776  Mod Eval          Mins  34476  High Eval                            Mins  17320  Re-Eval                               mins  59159    Timed Treatment:   55   mins   Total Treatment:     55   mins    Gabriela Lovelace, PT, DPT, cert. DN  Physical Therapist  [place] Lic # [id number]

## 2024-11-14 ENCOUNTER — TREATMENT (OUTPATIENT)
Dept: PHYSICAL THERAPY | Facility: CLINIC | Age: 74
End: 2024-11-14
Payer: MEDICARE

## 2024-11-14 DIAGNOSIS — R26.9 GAIT DISTURBANCE: ICD-10-CM

## 2024-11-14 DIAGNOSIS — R29.898 WEAKNESS OF LEFT SHOULDER: ICD-10-CM

## 2024-11-14 DIAGNOSIS — Z47.89 ORTHOPEDIC AFTERCARE: Primary | ICD-10-CM

## 2024-11-14 DIAGNOSIS — R53.1 GENERAL WEAKNESS: ICD-10-CM

## 2024-11-14 DIAGNOSIS — R26.81 UNSTEADINESS ON FEET: ICD-10-CM

## 2024-11-14 NOTE — PROGRESS NOTES
"Physical Therapy Daily Treatment Note  [address], [address], [place]  [address]    Patient: Arik August   : 1950  Diagnosis/ICD-10 Code:  Orthopedic aftercare [Z47.89]   Problems Addressed this Visit    None  Visit Diagnoses       Orthopedic aftercare    -  Primary    Weakness of left shoulder        General weakness        Gait disturbance        Unsteadiness on feet              Diagnoses         Codes Comments    Orthopedic aftercare    -  Primary ICD-10-CM: Z47.89  ICD-9-CM: V54.9     Weakness of left shoulder     ICD-10-CM: R29.898  ICD-9-CM: 781.99     General weakness     ICD-10-CM: R53.1  ICD-9-CM: 780.79     Gait disturbance     ICD-10-CM: R26.9  ICD-9-CM: 781.2     Unsteadiness on feet     ICD-10-CM: R26.81  ICD-9-CM: 781.2           Referring practitioner: BOBBI Almanza  Date of Initial Visit: Type: THERAPY  Noted: 2024  Today's Date: 2024    VISIT#: 4    Subjective   Christophe reports he was worn out after last tx session but \"everything wears me out.\"    Objective     See Exercise, Manual, and Modality Logs for complete treatment.     4ww use    Assessment/Plan  Christophe cont to lack LUE strength/control to complete biceps curl without IR compensation. However, supine AA B shoulder flexion improving in terms of control and LUE use. He will cont to benefit from functional movement training to facilitate bathing and dressing.   Progress strengthening /stabilization /functional activity         Timed:         Manual Therapy:         mins  38080;     Therapeutic Exercise:    15     mins  16359;     Neuromuscular Jessica:        mins  71897;    Therapeutic Activity:     15     mins  30413;     Gait Training:      10     mins  01981;     Ultrasound:          mins  91423;    Ionto                                   mins   01492  Self Care                            mins   71366  Tests & Measures              mins   08999  Botswanan stim                    mins   18032    Un-Timed:  Joon " Repos                   mins  08378  Electrical Stimulation:         mins  32147 ( );  Dry Needling          mins 18284/02712  Traction          mins 74476  Low Eval          Mins  25558  Mod Eval          Mins  61492  High Eval                            Mins  23307  Re-Eval                               mins  51666    Timed Treatment:   40   mins   Total Treatment:     40   mins    Gabriela Lovelace, PT, DPT, cert. DN  Physical Therapist  [place] Lic # [id number]

## 2024-11-19 ENCOUNTER — CLINICAL SUPPORT (OUTPATIENT)
Dept: FAMILY MEDICINE CLINIC | Facility: CLINIC | Age: 74
End: 2024-11-19
Payer: MEDICARE

## 2024-11-19 DIAGNOSIS — E34.9 TESTOSTERONE DEFICIENCY: Primary | ICD-10-CM

## 2024-11-19 PROCEDURE — 96372 THER/PROPH/DIAG INJ SC/IM: CPT | Performed by: NURSE PRACTITIONER

## 2024-11-19 RX ADMIN — TESTOSTERONE CYPIONATE 200 MG: 200 INJECTION, SOLUTION INTRAMUSCULAR at 08:40

## 2024-11-20 ENCOUNTER — TREATMENT (OUTPATIENT)
Dept: PHYSICAL THERAPY | Facility: CLINIC | Age: 74
End: 2024-11-20
Payer: MEDICARE

## 2024-11-20 DIAGNOSIS — Z47.89 ORTHOPEDIC AFTERCARE: Primary | ICD-10-CM

## 2024-11-20 DIAGNOSIS — R53.1 GENERAL WEAKNESS: ICD-10-CM

## 2024-11-20 DIAGNOSIS — R29.898 WEAKNESS OF LEFT SHOULDER: ICD-10-CM

## 2024-11-20 DIAGNOSIS — R26.9 GAIT DISTURBANCE: ICD-10-CM

## 2024-11-20 DIAGNOSIS — R26.81 UNSTEADINESS ON FEET: ICD-10-CM

## 2024-11-20 NOTE — PROGRESS NOTES
Physical Therapy Daily Treatment Note      Patient: Arik August   : 1950  Diagnosis/ICD-10 Code:  Orthopedic aftercare [Z47.89]   Problems Addressed this Visit    None  Visit Diagnoses       Orthopedic aftercare    -  Primary    Weakness of left shoulder        General weakness        Gait disturbance        Unsteadiness on feet              Diagnoses         Codes Comments    Orthopedic aftercare    -  Primary ICD-10-CM: Z47.89  ICD-9-CM: V54.9     Weakness of left shoulder     ICD-10-CM: R29.898  ICD-9-CM: 781.99     General weakness     ICD-10-CM: R53.1  ICD-9-CM: 780.79     Gait disturbance     ICD-10-CM: R26.9  ICD-9-CM: 781.2     Unsteadiness on feet     ICD-10-CM: R26.81  ICD-9-CM: 781.2           Referring practitioner: BOBBI Almanza  Date of Initial Visit: Type: THERAPY  Noted: 2024  Today's Date: 2024    VISIT#: 5    Subjective : Pt reports he is feeling better. Worked in his garage for about 3 hr yesterday on his [employer] and some the day before working on [employer] lights.     Objective :     See Exercise, Manual, and Modality Logs for complete treatment.     Assessment/Plan : Christophe continues to have difficulty with supine bicep curls with IR compensation. Improved ability during supine AAROM dowel mitchel press-up and flex. Able to perform static L UE hold at 90 deg shoulder flex x5 sec. He will cont to benefit from functional movement training to facilitate bathing and dressing.     Progress per Plan of Care and Progress strengthening /stabilization /functional activity         Timed:         Manual Therapy:         mins  45288;     Therapeutic Exercise:    23     mins  03600;     Neuromuscular Jessica:    15    mins  03729;    Therapeutic Activity:      15    mins  35024;     Gait Training:           mins  01612;     Ultrasound:          mins  53190;    Ionto                                   mins   30268  Self Care                            mins   14903    Un-Timed:  Electrical  Stimulation:         mins  88694 ( );  Dry Needling          mins 35714/16398  Traction          mins 02369  Canalith Repos                   mins  98349  Low Eval          Mins  05263  Mod Eval          Mins  51194  High Eval                            Mins  58540  Re-Eval                               mins  70157    Timed Treatment:   53   mins   Total Treatment:     53   mins    Sulema Richter PT, CLT  Physical Therapist  [place] Lic # [id number]

## 2024-11-21 ENCOUNTER — TREATMENT (OUTPATIENT)
Dept: PHYSICAL THERAPY | Facility: CLINIC | Age: 74
End: 2024-11-21
Payer: MEDICARE

## 2024-11-21 DIAGNOSIS — Z47.89 ORTHOPEDIC AFTERCARE: Primary | ICD-10-CM

## 2024-11-21 DIAGNOSIS — R53.1 GENERAL WEAKNESS: ICD-10-CM

## 2024-11-21 DIAGNOSIS — R26.81 UNSTEADINESS ON FEET: ICD-10-CM

## 2024-11-21 DIAGNOSIS — R26.9 GAIT DISTURBANCE: ICD-10-CM

## 2024-11-21 DIAGNOSIS — R29.898 WEAKNESS OF LEFT SHOULDER: ICD-10-CM

## 2024-11-21 RX ORDER — LANOLIN ALCOHOL/MO/W.PET/CERES
1 CREAM (GRAM) TOPICAL DAILY
Qty: 90 TABLET | Refills: 1 | Status: SHIPPED | OUTPATIENT
Start: 2024-11-21

## 2024-11-21 NOTE — PROGRESS NOTES
Physical Therapy Daily Treatment Note  [address], [address], [place]  [address]    Patient: Arik August   : 1950  Diagnosis/ICD-10 Code:  Orthopedic aftercare [Z47.89]   Problems Addressed this Visit    None  Visit Diagnoses       Orthopedic aftercare    -  Primary    General weakness        Weakness of left shoulder        Gait disturbance        Unsteadiness on feet              Diagnoses         Codes Comments    Orthopedic aftercare    -  Primary ICD-10-CM: Z47.89  ICD-9-CM: V54.9     General weakness     ICD-10-CM: R53.1  ICD-9-CM: 780.79     Weakness of left shoulder     ICD-10-CM: R29.898  ICD-9-CM: 781.99     Gait disturbance     ICD-10-CM: R26.9  ICD-9-CM: 781.2     Unsteadiness on feet     ICD-10-CM: R26.81  ICD-9-CM: 781.2           Referring practitioner: BOBBI Almanza  Date of Initial Visit: Type: THERAPY  Noted: 2024  Today's Date: 2024    VISIT#: 6    Subjective   Christophe reports he is sore all over bc of the cold weather. He's been working on motor skills at home. He notes he finds himself shuffling.    Objective     See Exercise, Manual, and Modality Logs for complete treatment.     Assessment/Plan  Christophe demonstrated improved gross motor function today during ball grab w/cup. L shoulder ROM improving w/supine flexion 130 deg, though still requires active assist w/dowel.   Progress per Plan of Care         Timed:         Manual Therapy:         mins  74154;     Therapeutic Exercise:    10     mins  21759;     Neuromuscular Jessica:    15    mins  47828;    Therapeutic Activity:     15     mins  25913;     Gait Training:           mins  80823;     Ultrasound:          mins  72146;    Ionto                                   mins   18348  Self Care                            mins   84249  Tests & Measures              mins   51345  Togolese stim                    mins   48448    Un-Timed:  Canalith Repos                   mins  26616  Electrical Stimulation:         mins   96078 ( );  Dry Needling          mins 44697/33113  Traction          mins 75074  Low Eval          Mins  80571  Mod Eval          Mins  48233  High Eval                            Mins  96778  Re-Eval                               mins  32430    Timed Treatment:   40   mins   Total Treatment:     40   mins    Gabriela Lovelace, PT, DPT, cert. DN  Physical Therapist  [place] Lic # [id number]

## 2024-11-26 ENCOUNTER — TREATMENT (OUTPATIENT)
Dept: PHYSICAL THERAPY | Facility: CLINIC | Age: 74
End: 2024-11-26
Payer: MEDICARE

## 2024-11-26 DIAGNOSIS — R26.81 UNSTEADINESS ON FEET: ICD-10-CM

## 2024-11-26 DIAGNOSIS — R53.1 GENERAL WEAKNESS: ICD-10-CM

## 2024-11-26 DIAGNOSIS — R26.9 GAIT DISTURBANCE: ICD-10-CM

## 2024-11-26 DIAGNOSIS — Z47.89 ORTHOPEDIC AFTERCARE: Primary | ICD-10-CM

## 2024-11-26 DIAGNOSIS — R29.898 WEAKNESS OF LEFT SHOULDER: ICD-10-CM

## 2024-11-26 NOTE — PROGRESS NOTES
Physical Therapy Daily Treatment Note      Patient: Arik August   : 1950  Diagnosis/ICD-10 Code:  Orthopedic aftercare [Z47.89]   Problems Addressed this Visit    None  Visit Diagnoses       Orthopedic aftercare    -  Primary    General weakness        Weakness of left shoulder        Gait disturbance        Unsteadiness on feet              Diagnoses         Codes Comments    Orthopedic aftercare    -  Primary ICD-10-CM: Z47.89  ICD-9-CM: V54.9     General weakness     ICD-10-CM: R53.1  ICD-9-CM: 780.79     Weakness of left shoulder     ICD-10-CM: R29.898  ICD-9-CM: 781.99     Gait disturbance     ICD-10-CM: R26.9  ICD-9-CM: 781.2     Unsteadiness on feet     ICD-10-CM: R26.81  ICD-9-CM: 781.2           Referring practitioner: BOBBI Almanza  Date of Initial Visit: Type: THERAPY  Noted: 2024  Today's Date: 2024    VISIT#: 7    Subjective : No pain in L shoulder. Feels like he has a little more mobility in L shoulder. States he was on his feet a lot 3 days since last visit working on lights for [employer]. Reports he is struggling with  when opening a jar. Was getting on/off [id number]-dd-[id number] a lot. Was tired each day but felt good the next day. Sees lung doctor today. Has not needed to use O2 in weeks.    Objective : added 30 sec spurts of increased speed to 30 sec rest on NuStep. Pt able to do 3 rounds of this and not able to do 4th..  Added towel wringing to work on  and twist motion.    See Exercise, Manual, and Modality Logs for complete treatment.     Assessment/Plan : Christophe continues to have difficulty with ER AROM in supine. Improved ability during dowel mitchel press-up and flexion. Side stepping over object on floor improving with B UE support. Pt was challenged with 30 sec of increased ildefonso on NuStep and fatigued quickly. Plan to attempt next time with 30s work to 1 min rest next visit.    Progress per Plan of Care and Progress strengthening /stabilization  /functional activity         Timed:         Manual Therapy:         mins  35587;     Therapeutic Exercise:    15     mins  76470;     Neuromuscular Jessica:    10    mins  53504;    Therapeutic Activity:     15     mins  12760;     Gait Training:           mins  72441;     Ultrasound:          mins  10048;    Ionto                                   mins   00915  Self Care                            mins   56258    Un-Timed:  Electrical Stimulation:         mins  48814 ( );  Dry Needling          mins 25056/53843  Traction          mins 65294  Canalith Repos                   mins  54737  Low Eval          Mins  64424  Mod Eval          Mins  80840  High Eval                            Mins  73948  Re-Eval                               mins  71689    Timed Treatment:   40   mins   Total Treatment:     40   mins    Sulema Richter PT, CLT  Physical Therapist  [place] Lic # [id number]

## 2024-11-27 ENCOUNTER — TREATMENT (OUTPATIENT)
Dept: PHYSICAL THERAPY | Facility: CLINIC | Age: 74
End: 2024-11-27
Payer: MEDICARE

## 2024-11-27 DIAGNOSIS — R29.898 WEAKNESS OF LEFT SHOULDER: ICD-10-CM

## 2024-11-27 DIAGNOSIS — R53.1 GENERAL WEAKNESS: ICD-10-CM

## 2024-11-27 DIAGNOSIS — Z47.89 ORTHOPEDIC AFTERCARE: Primary | ICD-10-CM

## 2024-11-27 DIAGNOSIS — R26.9 GAIT DISTURBANCE: ICD-10-CM

## 2024-11-27 DIAGNOSIS — R26.81 UNSTEADINESS ON FEET: ICD-10-CM

## 2024-11-27 NOTE — PROGRESS NOTES
Physical Therapy Daily Treatment Note      Patient: Arik August   : 1950  Diagnosis/ICD-10 Code:  Orthopedic aftercare [Z47.89]   Problems Addressed this Visit    None  Visit Diagnoses       Orthopedic aftercare    -  Primary    General weakness        Weakness of left shoulder        Gait disturbance        Unsteadiness on feet              Diagnoses         Codes Comments    Orthopedic aftercare    -  Primary ICD-10-CM: Z47.89  ICD-9-CM: V54.9     General weakness     ICD-10-CM: R53.1  ICD-9-CM: 780.79     Weakness of left shoulder     ICD-10-CM: R29.898  ICD-9-CM: 781.99     Gait disturbance     ICD-10-CM: R26.9  ICD-9-CM: 781.2     Unsteadiness on feet     ICD-10-CM: R26.81  ICD-9-CM: 781.2           Referring practitioner: BOBBI Almanza  Date of Initial Visit: Type: THERAPY  Noted: 2024  Today's Date: 2024    VISIT#: 8    Subjective : pt reports he is feeling ok today. Not sore from PT appt yesterday. States he worked on some daniela lights at home last night but does not like them and plans to take then down today.     Objective : Pt with B foot drop, L>R. PT used velcro strap to simulate AFO on L foot and pt reported that walking felt much better and more secure. Talked with pt about options for AFO. Pt plans to order type from [employer] that uses velcro straps first. Provided pt with print out.     Per[facility] with 30s work to 1 min rest x4 rounds.    See Exercise, Manual, and Modality Logs for complete treatment.     Assessment/Plan : Gait significantly improved with simulation of L AFO. Pt will benefit from L AFO to improve safety during all ambulation along with improved gait pattern. Good tolerance to ther ex. F/u with pt if he ordered AFO next visit.     Progress per Plan of Care and Progress strengthening /stabilization /functional activity         Timed:         Manual Therapy:         mins  09857;     Therapeutic Exercise:    15      mins  55509;     Neuromuscular Jessica:    10    mins  19735;    Therapeutic Activity:     15     mins  65984;     Gait Training:           mins  64179;     Ultrasound:          mins  06870;    Ionto                                   mins   48134  Self Care                            mins   99479    Un-Timed:  Electrical Stimulation:         mins  52331 ( );  Dry Needling          mins 27677/85188  Traction          mins 06561  Canalith Repos                   mins  17629  Low Eval          Mins  50716  Mod Eval          Mins  93937  High Eval                            Mins  46812  Re-Eval                               mins  44712    Timed Treatment:   40   mins   Total Treatment:     40   mins    Sulema Richter PT, CLT  Physical Therapist  [place] Lic # [id number]

## 2024-12-02 RX ORDER — ROSUVASTATIN CALCIUM 20 MG/1
20 TABLET, COATED ORAL DAILY
Qty: 90 TABLET | Refills: 1 | Status: SHIPPED | OUTPATIENT
Start: 2024-12-02

## 2024-12-03 ENCOUNTER — CLINICAL SUPPORT (OUTPATIENT)
Dept: FAMILY MEDICINE CLINIC | Facility: CLINIC | Age: 74
End: 2024-12-03
Payer: MEDICARE

## 2024-12-03 DIAGNOSIS — E34.9 TESTOSTERONE DEFICIENCY: Primary | ICD-10-CM

## 2024-12-03 PROCEDURE — 96372 THER/PROPH/DIAG INJ SC/IM: CPT | Performed by: NURSE PRACTITIONER

## 2024-12-03 RX ADMIN — TESTOSTERONE CYPIONATE 200 MG: 200 INJECTION, SOLUTION INTRAMUSCULAR at 09:31

## 2024-12-04 ENCOUNTER — TREATMENT (OUTPATIENT)
Dept: PHYSICAL THERAPY | Facility: CLINIC | Age: 74
End: 2024-12-04
Payer: MEDICARE

## 2024-12-04 DIAGNOSIS — R53.1 GENERAL WEAKNESS: ICD-10-CM

## 2024-12-04 DIAGNOSIS — Z47.89 ORTHOPEDIC AFTERCARE: Primary | ICD-10-CM

## 2024-12-04 DIAGNOSIS — R29.898 WEAKNESS OF LEFT SHOULDER: ICD-10-CM

## 2024-12-04 DIAGNOSIS — R26.9 GAIT DISTURBANCE: ICD-10-CM

## 2024-12-04 DIAGNOSIS — R26.81 UNSTEADINESS ON FEET: ICD-10-CM

## 2024-12-04 PROCEDURE — 97110 THERAPEUTIC EXERCISES: CPT | Performed by: PHYSICAL THERAPIST

## 2024-12-04 PROCEDURE — 97112 NEUROMUSCULAR REEDUCATION: CPT | Performed by: PHYSICAL THERAPIST

## 2024-12-04 PROCEDURE — 97530 THERAPEUTIC ACTIVITIES: CPT | Performed by: PHYSICAL THERAPIST

## 2024-12-04 NOTE — PROGRESS NOTES
Physical Therapy Daily Treatment Note      Patient: Arik August   : 1950  Diagnosis/ICD-10 Code:  Orthopedic aftercare [Z47.89]   Problems Addressed this Visit    None  Visit Diagnoses       Orthopedic aftercare    -  Primary    General weakness        Weakness of left shoulder        Gait disturbance        Unsteadiness on feet              Diagnoses         Codes Comments    Orthopedic aftercare    -  Primary ICD-10-CM: Z47.89  ICD-9-CM: V54.9     General weakness     ICD-10-CM: R53.1  ICD-9-CM: 780.79     Weakness of left shoulder     ICD-10-CM: R29.898  ICD-9-CM: 781.99     Gait disturbance     ICD-10-CM: R26.9  ICD-9-CM: 781.2     Unsteadiness on feet     ICD-10-CM: R26.81  ICD-9-CM: 781.2           Referring practitioner: BOBBI Almanza  Date of Initial Visit: Type: THERAPY  Noted: 2024  Today's Date: 2024    VISIT#: 9    Subjective : Pt reports his shoulder is feeling fine. Bought an AFO velcro device, received it yesterday and work it all day. States it feels good. Had a round night sleeping last night, congested and could not get comfortable.     Objective :     See Exercise, Manual, and Modality Logs for complete treatment.     Assessment/Plan : Gait improved with use of foot drop device on L foot. Able to perform side stepping without UE support. Did require UE support for side stepping over noodle. Able to complete 5 rounds of increased ildefonso x30s: 1 min rest on NuStep this date. Significant weakness L shoulder ER and adduction.     Goals  Plan Goals: STG to be met in 7 wk:  - Increase L shoulder flex AROM to 100 deg.  - Pt to transfers sit to stand x5 reps from chair without use of B UE for ease getting up from all chairs.  - Increase L ankle DF and eversion to 3-/5 for improved stability walking on uneven surfaces.  LTG to be met in 12 wk:  - Improve Quick DASH to 40% or less impairment.  - Increase L shoulder flex AROM to 110 deg for improved over head use of L UE.  -  Improve TUG to 15 sec with use of standard cane for safe community ambulation.  - Increase L shoulder strength to 3+/5 or better in available range for ability to perform household tasks.  - Improve 30 sec STS to 11 reps without use of hands for ease getting up from all surfaces.  - Pt to be independent with HEP.    Progress per Plan of Care and Progress strengthening /stabilization /functional activity         Timed:         Manual Therapy:         mins  80888;     Therapeutic Exercise:    15     mins  17808;     Neuromuscular Jessica:    10    mins  71147;    Therapeutic Activity:     15     mins  16093;     Gait Training:           mins  79486;     Ultrasound:          mins  07998;    Ionto                                   mins   34873  Self Care                            mins   45520    Un-Timed:  Electrical Stimulation:         mins  34072 ( );  Dry Needling          mins 30256/40989  Traction          mins 43518  Canalith Repos                   mins  00564  Low Eval          Mins  53569  Mod Eval          Mins  16329  High Eval                            Mins  00807  Re-Eval                               mins  91673    Timed Treatment:   40   mins   Total Treatment:     40   mins    Sulema Richter PT, CLT  Physical Therapist  [place] Lic # [id number]

## 2024-12-09 ENCOUNTER — TREATMENT (OUTPATIENT)
Dept: PHYSICAL THERAPY | Facility: CLINIC | Age: 74
End: 2024-12-09
Payer: MEDICARE

## 2024-12-09 DIAGNOSIS — Z47.89 ORTHOPEDIC AFTERCARE: Primary | ICD-10-CM

## 2024-12-09 DIAGNOSIS — R29.898 WEAKNESS OF LEFT SHOULDER: ICD-10-CM

## 2024-12-09 DIAGNOSIS — R53.1 GENERAL WEAKNESS: ICD-10-CM

## 2024-12-09 DIAGNOSIS — R26.9 GAIT DISTURBANCE: ICD-10-CM

## 2024-12-09 DIAGNOSIS — R26.81 UNSTEADINESS ON FEET: ICD-10-CM

## 2024-12-09 PROCEDURE — 97110 THERAPEUTIC EXERCISES: CPT | Performed by: PHYSICAL THERAPIST

## 2024-12-09 PROCEDURE — 97530 THERAPEUTIC ACTIVITIES: CPT | Performed by: PHYSICAL THERAPIST

## 2024-12-09 PROCEDURE — 97112 NEUROMUSCULAR REEDUCATION: CPT | Performed by: PHYSICAL THERAPIST

## 2024-12-09 NOTE — PROGRESS NOTES
Physical Therapy Progress Note/Reassessment  [address] [place] [address] , [place] [address]    Patient: Arik August   : 1950  Diagnosis/ICD-10 Code:  Orthopedic aftercare [Z47.89]  Referring practitioner: BOBBI Almanza  Date of Initial Evaluation:  Type: THERAPY  Noted: 2024  Treatment date: 2024  Patient seen for 10 sessions      Subjective:   Visit Diagnoses:    ICD-10-CM ICD-9-CM   1. Orthopedic aftercare  Z47.89 V54.9   2. General weakness  R53.1 780.79   3. Weakness of left shoulder  R29.898 781.99   4. Gait disturbance  R26.9 781.2   5. Unsteadiness on feet  R26.81 781.2         Arik August reports he is feeling better compared to last week. States he had a lot of head and chest congestion. Stayed in bed for 2 days and states he almost went to the hospital because he was feeling so bad. States he has been taking some over the counter medicine and is starting to feel better. Has been monitoring his O2 every day and his weight every morning.   Subjective Questionnaire: QuickDASH: 52% impairment  Clinical Progress: improved  Home Program Compliance: Yes  Treatment has included: therapeutic exercise, neuromuscular re-education, manual therapy, therapeutic activity, and gait training      Objective   L ankle DF = 2+/5  R ankle DF = 3-/5    Performed STS x5 reps without use of hands.    L shoulder flex AROM = 93 deg seated  Abd = 88 deg seated    See Exercise, Manual, and Modality Logs for complete treatment.     Assessment/Plan : Pt is making steady progress in L shoulder strength and general strength and mobility. Quick DASH has improved from 73% impairment to 52% impairment. L shoulder abd AROM has improved from 50 deg to 88 deg. He continues to have B foot drop, L>R. He has started using a foot drop device (not AFO) and feels more steady when walking. Did not do interval training on NuStep this date due to pt not feeling well with chest and head congestion. Plan to progress strengthening,  balance and gait as tolerated.     Goals  Plan Goals: STG to be met in 7 wk:  - Increase L shoulder flex AROM to 100 deg.  - Pt to transfers sit to stand x5 reps from chair without use of B UE for ease getting up from all chairs. - MET  - Increase L ankle DF and eversion to 3-/5 for improved stability walking on uneven surfaces. - Progressing  LTG to be met in 12 wk:  - Improve Quick DASH to 40% or less impairment.  - Increase L shoulder flex AROM to 110 deg for improved over head use of L UE.  - Improve TUG to 15 sec with use of standard cane for safe community ambulation.  - Increase L shoulder strength to 3+/5 or better in available range for ability to perform household tasks.  - Improve 30 sec STS to 11 reps without use of hands for ease getting up from all surfaces.  - Pt to be independent with HEP.       Recommendations: Continue as planned  Timeframe: 2 months  Prognosis to achieve goals: good      Timed:         Manual Therapy:         mins  23451;     Therapeutic Exercise:    15     mins  61832;     Neuromuscular Jessica:    10    mins  75802;    Therapeutic Activity:     15     mins  08825;     Gait Training:           mins  27449;     Ultrasound:          mins  91492;    Ionto                                   mins   55271  Self Care                            mins   92900      Un-Timed:  Electrical Stimulation:         mins  20396 ( );  Dry Needling          mins self-pay  Traction          mins 00442  Canalith Repos         mins 78889      Timed Treatment:   40   mins   Total Treatment:     40   mins    PT Signature: Sulema Richter PT, CLT  PT License: [place] Lic # [id number]

## 2024-12-11 ENCOUNTER — TREATMENT (OUTPATIENT)
Dept: PHYSICAL THERAPY | Facility: CLINIC | Age: 74
End: 2024-12-11
Payer: MEDICARE

## 2024-12-11 DIAGNOSIS — R29.898 WEAKNESS OF LEFT SHOULDER: ICD-10-CM

## 2024-12-11 DIAGNOSIS — Z47.89 ORTHOPEDIC AFTERCARE: Primary | ICD-10-CM

## 2024-12-11 DIAGNOSIS — R26.81 UNSTEADINESS ON FEET: ICD-10-CM

## 2024-12-11 DIAGNOSIS — R53.1 GENERAL WEAKNESS: ICD-10-CM

## 2024-12-11 DIAGNOSIS — R26.9 GAIT DISTURBANCE: ICD-10-CM

## 2024-12-11 NOTE — PROGRESS NOTES
Physical Therapy Daily Treatment Note      Patient: Arik August   : 1950  Diagnosis/ICD-10 Code:  Orthopedic aftercare [Z47.89]   Problems Addressed this Visit    None  Visit Diagnoses       Orthopedic aftercare    -  Primary    General weakness        Weakness of left shoulder        Gait disturbance        Unsteadiness on feet              Diagnoses         Codes Comments    Orthopedic aftercare    -  Primary ICD-10-CM: Z47.89  ICD-9-CM: V54.9     General weakness     ICD-10-CM: R53.1  ICD-9-CM: 780.79     Weakness of left shoulder     ICD-10-CM: R29.898  ICD-9-CM: 781.99     Gait disturbance     ICD-10-CM: R26.9  ICD-9-CM: 781.2     Unsteadiness on feet     ICD-10-CM: R26.81  ICD-9-CM: 781.2           Referring practitioner: BOBBI Almanza  Date of Initial Visit: Type: THERAPY  Noted: 2024  Today's Date: 2024    VISIT#: 11    Subjective : Pt reports he was feeling better yesterday (in regards to head and chest congestion) until he was outside for ~1 hr working on community [employer] lights and feels congested again today. Had a hard time walking in the grass in the dark with walking stick. He stayed next to his qtap-iu-zsyf and hold onto it and his brother-in-law did the work.    Objective : added shoulder flex by sliding hand up board placed on wedge with focus on avoiding abduction.   Added side stepping onto foam with single UE support and CGA.    See Exercise, Manual, and Modality Logs for complete treatment.     Assessment/Plan : Good tolerance to ther ex progression. Pt needs continued L shoulder strengthening in addition to general strengthening and balance training to improve safety during all ambulation and ease with functional tasks.    Progress per Plan of Care and Progress strengthening /stabilization /functional activity         Timed:         Manual Therapy:         mins  26841;     Therapeutic Exercise:    15     mins  17356;     Neuromuscular Jessica:    10    mins   20268;    Therapeutic Activity:     15     mins  96982;     Gait Training:           mins  23310;     Ultrasound:          mins  72227;    Ionto                                  mins   57842  Self Care                            mins   24365    Un-Timed:  Electrical Stimulation:         mins  96607 ( );  Dry Needling          mins 75995/04292  Traction          mins 21212  Canalith Repos                  mins  81733  Low Eval          Mins  80605  Mod Eval          Mins  58713  High Eval                            Mins  64734  Re-Eval                               mins  45725    Timed Treatment:   40   mins   Total Treatment:     40   mins    Sulema Richter PT, CLT  Physical Therapist  [place] Lic # [id number]

## 2024-12-16 ENCOUNTER — TREATMENT (OUTPATIENT)
Dept: PHYSICAL THERAPY | Facility: CLINIC | Age: 74
End: 2024-12-16
Payer: MEDICARE

## 2024-12-16 DIAGNOSIS — R26.81 UNSTEADINESS ON FEET: ICD-10-CM

## 2024-12-16 DIAGNOSIS — R26.9 GAIT DISTURBANCE: ICD-10-CM

## 2024-12-16 DIAGNOSIS — Z47.89 ORTHOPEDIC AFTERCARE: Primary | ICD-10-CM

## 2024-12-16 DIAGNOSIS — R53.1 GENERAL WEAKNESS: ICD-10-CM

## 2024-12-16 DIAGNOSIS — R29.898 WEAKNESS OF LEFT SHOULDER: ICD-10-CM

## 2024-12-16 PROCEDURE — 97530 THERAPEUTIC ACTIVITIES: CPT | Performed by: PHYSICAL THERAPIST

## 2024-12-16 PROCEDURE — 97110 THERAPEUTIC EXERCISES: CPT | Performed by: PHYSICAL THERAPIST

## 2024-12-16 PROCEDURE — 97112 NEUROMUSCULAR REEDUCATION: CPT | Performed by: PHYSICAL THERAPIST

## 2024-12-16 NOTE — PROGRESS NOTES
Physical Therapy Daily Treatment Note      Patient: Arik August   : 1950  Diagnosis/ICD-10 Code:  Orthopedic aftercare [Z47.89]   Problems Addressed this Visit    None  Visit Diagnoses       Orthopedic aftercare    -  Primary    General weakness        Weakness of left shoulder        Gait disturbance        Unsteadiness on feet              Diagnoses         Codes Comments    Orthopedic aftercare    -  Primary ICD-10-CM: Z47.89  ICD-9-CM: V54.9     General weakness     ICD-10-CM: R53.1  ICD-9-CM: 780.79     Weakness of left shoulder     ICD-10-CM: R29.898  ICD-9-CM: 781.99     Gait disturbance     ICD-10-CM: R26.9  ICD-9-CM: 781.2     Unsteadiness on feet     ICD-10-CM: R26.81  ICD-9-CM: 781.2           Referring practitioner: BOBBI Almanza  Date of Initial Visit: Type: THERAPY  Noted: 2024  Today's Date: 2024    VISIT#: 12    Subjective : Pt reports he is doing pretty good. Has gotten over the upper respiratory congestion he had last week. Hopes to get his balance better and get off rollator.    Objective : added forward and lateral stepping reactions. Pt with 2 mild LOB during forward stepping requiring CGA to MIN A to maintain balance.    Pt states he plans to call cardiologist about cardiac rehab.     See Exercise, Manual, and Modality Logs for complete treatment.     Assessment/Plan : Good tolerance to ther ex progression but stepping reactions challenging. Pt needs continued L shoulder strengthening in addition to general strengthening and balance training to improve safety and independence during all ambulation and ease with functional tasks.     Progress strengthening /stabilization /functional activity         Timed:         Manual Therapy:         mins  02504;     Therapeutic Exercise:    15     mins  76409;     Neuromuscular Jessica:    10    mins  45798;    Therapeutic Activity:     15     mins  76885;     Gait Training:           mins  99338;     Ultrasound:          mins   36952;    Ionto                                   mins   11701  Self Care                            mins   37285    Un-Timed:  Electrical Stimulation:         mins  00300 ( );  Dry Needling          mins 77228/88662  Traction          mins 20801  Canalith Repos                   mins  23068  Low Eval          Mins  42937  Mod Eval          Mins  04009  High Eval                            Mins  66537  Re-Eval                               mins  17238    Timed Treatment:   40   mins   Total Treatment:     40   mins    Sulema Richter PT, CLT  Physical Therapist  [place] Lic # [id number]

## 2024-12-17 ENCOUNTER — CLINICAL SUPPORT (OUTPATIENT)
Dept: FAMILY MEDICINE CLINIC | Facility: CLINIC | Age: 74
End: 2024-12-17
Payer: MEDICARE

## 2024-12-17 DIAGNOSIS — E34.9 TESTOSTERONE DEFICIENCY: Primary | ICD-10-CM

## 2024-12-17 PROCEDURE — 96372 THER/PROPH/DIAG INJ SC/IM: CPT | Performed by: NURSE PRACTITIONER

## 2024-12-17 RX ADMIN — TESTOSTERONE CYPIONATE 200 MG: 200 INJECTION, SOLUTION INTRAMUSCULAR at 09:16

## 2024-12-20 ENCOUNTER — TREATMENT (OUTPATIENT)
Dept: PHYSICAL THERAPY | Facility: CLINIC | Age: 74
End: 2024-12-20
Payer: MEDICARE

## 2024-12-20 DIAGNOSIS — Z47.89 ORTHOPEDIC AFTERCARE: Primary | ICD-10-CM

## 2024-12-20 DIAGNOSIS — R53.1 GENERAL WEAKNESS: ICD-10-CM

## 2024-12-20 DIAGNOSIS — R29.898 WEAKNESS OF LEFT SHOULDER: ICD-10-CM

## 2024-12-20 DIAGNOSIS — R26.9 GAIT DISTURBANCE: ICD-10-CM

## 2024-12-20 DIAGNOSIS — R26.81 UNSTEADINESS ON FEET: ICD-10-CM

## 2024-12-20 NOTE — PROGRESS NOTES
Physical Therapy Daily Treatment Note      Patient: Arik August   : 1950  Diagnosis/ICD-10 Code:  Orthopedic aftercare [Z47.89]   Problems Addressed this Visit    None  Visit Diagnoses       Orthopedic aftercare    -  Primary    General weakness        Weakness of left shoulder        Gait disturbance        Unsteadiness on feet              Diagnoses         Codes Comments    Orthopedic aftercare    -  Primary ICD-10-CM: Z47.89  ICD-9-CM: V54.9     General weakness     ICD-10-CM: R53.1  ICD-9-CM: 780.79     Weakness of left shoulder     ICD-10-CM: R29.898  ICD-9-CM: 781.99     Gait disturbance     ICD-10-CM: R26.9  ICD-9-CM: 781.2     Unsteadiness on feet     ICD-10-CM: R26.81  ICD-9-CM: 781.2           Referring practitioner: BOBBI Almanza  Date of Initial Visit: Type: THERAPY  Noted: 2024  Today's Date: 2024    VISIT#: 13    Subjective : Pt reports he called cardiac rehab to schedule and is waiting on a call back. States foot-drop brace on L does not feel right today.    Objective : PT adjusted foot-drop brace and pt reported improved comfort.    Instructed pt to practice walking in his home with cane. Reviewed proper height of cane and to use it in R hand. Pt verbalized understanding.    See Exercise, Manual, and Modality Logs for complete treatment.     Assessment/Plan : Pt demonstrated good ability during ambulation with standard cane. Pt to practice using cane at home to get more comfortable with it before using in the community. Pt needs continued L shoulder strengthening in addition to general strengthening and balance training to improve safety and independence during all ambulation and ease with functional tasks.      Progress strengthening /stabilization /functional activity      Timed:         Manual Therapy:         mins  31304;     Therapeutic Exercise:    15     mins  04590;     Neuromuscular Jessica:    10    mins  03790;    Therapeutic Activity:     15     mins  86963;      Gait Training:           mins  56568;     Ultrasound:          mins  66066;    Ionto                                   mins   41414  Self Care                            mins   83183    Un-Timed:  Electrical Stimulation:         mins  29016 ( );  Dry Needling          mins 01926/78948  Traction          mins 04998  Canalith Repos                   mins  15128  Low Eval          Mins  77984  Mod Eval          Mins  33099  High Eval                            Mins  88822  Re-Eval                               mins  63801    Timed Treatment:   40   mins   Total Treatment:     40   mins    Sulema Richter PT, CLT  Physical Therapist  [place] Lic # [id number]

## 2024-12-23 ENCOUNTER — TREATMENT (OUTPATIENT)
Dept: PHYSICAL THERAPY | Facility: CLINIC | Age: 74
End: 2024-12-23
Payer: MEDICARE

## 2024-12-23 DIAGNOSIS — R26.81 UNSTEADINESS ON FEET: ICD-10-CM

## 2024-12-23 DIAGNOSIS — R29.898 WEAKNESS OF LEFT SHOULDER: ICD-10-CM

## 2024-12-23 DIAGNOSIS — R53.1 GENERAL WEAKNESS: ICD-10-CM

## 2024-12-23 DIAGNOSIS — R26.9 GAIT DISTURBANCE: ICD-10-CM

## 2024-12-23 DIAGNOSIS — Z47.89 ORTHOPEDIC AFTERCARE: Primary | ICD-10-CM

## 2024-12-23 PROCEDURE — 97110 THERAPEUTIC EXERCISES: CPT | Performed by: PHYSICAL THERAPIST

## 2024-12-23 PROCEDURE — 97530 THERAPEUTIC ACTIVITIES: CPT | Performed by: PHYSICAL THERAPIST

## 2024-12-23 NOTE — PROGRESS NOTES
Physical Therapy Daily Treatment Note      Patient: Arik August   : 1950  Diagnosis/ICD-10 Code:  Orthopedic aftercare [Z47.89]   Problems Addressed this Visit    None  Visit Diagnoses       Orthopedic aftercare    -  Primary    General weakness        Weakness of left shoulder        Gait disturbance        Unsteadiness on feet              Diagnoses         Codes Comments    Orthopedic aftercare    -  Primary ICD-10-CM: Z47.89  ICD-9-CM: V54.9     General weakness     ICD-10-CM: R53.1  ICD-9-CM: 780.79     Weakness of left shoulder     ICD-10-CM: R29.898  ICD-9-CM: 781.99     Gait disturbance     ICD-10-CM: R26.9  ICD-9-CM: 781.2     Unsteadiness on feet     ICD-10-CM: R26.81  ICD-9-CM: 781.2           Referring practitioner: BOBBI Almanza  Date of Initial Visit: Type: THERAPY  Noted: 2024  Today's Date: 2024    VISIT#: 14    Subjective : Pt reports his legs have been feeling weak. States his shoulder is feeling fine, just L arm is weak. States he has been to at least one ball game everyday since Thurs. Pt reports he is just not feeling great today. States he starts cardiac rehab at [facility] on 2025.     Objective : session ended early and did not complete many standing exercises due to pt being fatigued and session ended a little early.    Required seated rest break after 1 lap around gym with cane.  Educated pt that LE weakness could be due to increased activity and walking going to the ball games.    See Exercise, Manual, and Modality Logs for complete treatment.     Assessment/Plan : c/o B LE fatigue affecting pt's tolerance to PT today. Pt did well during gait training with standard cane but fatigued quickly. Will resume balance activities next visit as tolerated.    Progress strengthening /stabilization /functional activity      Timed:         Manual Therapy:         mins  61290;     Therapeutic Exercise:    15     mins  92444;     Neuromuscular Jessica:        mins  32285;     Therapeutic Activity:     10     mins  94317;     Gait Training:           mins  39262;     Ultrasound:          mins  33785;    Ionto                                   mins   58132  Self Care                            mins   13216    Un-Timed:  Electrical Stimulation:         mins  49245 ( );  Dry Needling          mins 84025/88341  Traction          mins 11817  Canalith Repos                   mins  93039  Low Eval          Mins  47135  Mod Eval          Mins  80011  High Eval                            Mins  82617  Re-Eval                               mins  49890    Timed Treatment:   25   mins   Total Treatment:     25   mins    Sulema Richter PT, CLT  Physical Therapist  [place] Lic # [id number]

## 2024-12-30 ENCOUNTER — TREATMENT (OUTPATIENT)
Dept: PHYSICAL THERAPY | Facility: CLINIC | Age: 74
End: 2024-12-30
Payer: MEDICARE

## 2024-12-30 DIAGNOSIS — Z47.89 ORTHOPEDIC AFTERCARE: Primary | ICD-10-CM

## 2024-12-30 DIAGNOSIS — R26.81 UNSTEADINESS ON FEET: ICD-10-CM

## 2024-12-30 DIAGNOSIS — R26.9 GAIT DISTURBANCE: ICD-10-CM

## 2024-12-30 DIAGNOSIS — R29.898 WEAKNESS OF LEFT SHOULDER: ICD-10-CM

## 2024-12-30 DIAGNOSIS — R53.1 GENERAL WEAKNESS: ICD-10-CM

## 2024-12-30 PROCEDURE — 97110 THERAPEUTIC EXERCISES: CPT | Performed by: PHYSICAL THERAPIST

## 2024-12-30 PROCEDURE — 97112 NEUROMUSCULAR REEDUCATION: CPT | Performed by: PHYSICAL THERAPIST

## 2024-12-30 PROCEDURE — 97530 THERAPEUTIC ACTIVITIES: CPT | Performed by: PHYSICAL THERAPIST

## 2024-12-30 NOTE — PROGRESS NOTES
Physical Therapy Daily Treatment Note / DISCHARGE SUMMARY      Patient: Arik August   : 1950  Diagnosis/ICD-10 Code:  Orthopedic aftercare [Z47.89]   Problems Addressed this Visit    None  Visit Diagnoses       Orthopedic aftercare    -  Primary    General weakness        Weakness of left shoulder        Gait disturbance        Unsteadiness on feet              Diagnoses         Codes Comments    Orthopedic aftercare    -  Primary ICD-10-CM: Z47.89  ICD-9-CM: V54.9     General weakness     ICD-10-CM: R53.1  ICD-9-CM: 780.79     Weakness of left shoulder     ICD-10-CM: R29.898  ICD-9-CM: 781.99     Gait disturbance     ICD-10-CM: R26.9  ICD-9-CM: 781.2     Unsteadiness on feet     ICD-10-CM: R26.81  ICD-9-CM: 781.2           Referring practitioner: BOBBI Almanza  Date of Initial Visit: Type: THERAPY  Noted: 2024  Today's Date: 2024    VISIT#: 15    Subjective : Pt reports his shoulder is feeling pretty good, just weak. Starts cardiac rehab on 2025. Wants to plan today to being last PT visit. Has to cancel appt later this week due to dermatology appt and does not want to reschedule due to brother passing away over the weekend and will be busy with visitation and .    Objective :  L shoulder flex AROM seated = 100 deg  L shoulder strength grossly 2+/5 to 3-/5    L ankle DF = 2+/5    TUG = 20 sec with standard cane  30 sec STS = 9 reps without use of hands    See Exercise, Manual, and Modality Logs for complete treatment.     Assessment/Plan : Pt continues to have weakness and limitation of L shoulder. Flex AROM has improved from 90 deg to 100 deg. He is demonstrating improved gait pattern with use of foot drop device on L ankle. Pt is starting cardiac rehab next week and plans to return to PT after that to further work on balance if needed.    Goals  Plan Goals: STG to be met in 7 wk:  - Increase L shoulder flex AROM to 100 deg. - MET  - Pt to transfers sit to stand x5 reps from  chair without use of B UE for ease getting up from all chairs. - MET  - Increase L ankle DF and eversion to 3-/5 for improved stability walking on uneven surfaces. - Not met  LTG to be met in 12 wk:  - Improve Quick DASH to 40% or less impairment. - Not met  - Increase L shoulder flex AROM to 110 deg for improved over head use of L UE. - Not met  - Improve TUG to 15 sec with use of standard cane for safe community ambulation. - Not met  - Increase L shoulder strength to 3+/5 or better in available range for ability to perform household tasks. - Not met  - Improve 30 sec STS to 11 reps without use of hands for ease getting up from all surfaces. - Not met  - Pt to be independent with HEP. - MET    Plan to DC with HEP      Timed:         Manual Therapy:         mins  18155;     Therapeutic Exercise:    15     mins  09192;     Neuromuscular Jessica:    15    mins  83274;    Therapeutic Activity:     8     mins  53184;     Gait Training:           mins  91971;     Ultrasound:          mins  26208;    Ionto                                   mins   23785  Self Care                            mins   55519    Un-Timed:  Electrical Stimulation:         mins  20998 ( );  Dry Needling          mins 65834/99893  Traction          mins 35546  Canalith Repos                   mins  24776  Low Eval          Mins  03972  Mod Eval          Mins  78111  High Eval                            Mins  65759  Re-Eval                               mins  11510    Timed Treatment:   38   mins   Total Treatment:     38   mins    Sulema Richter PT, CLT  Physical Therapist  [place] Lic # [id number]

## 2025-01-06 RX ORDER — LEVOTHYROXINE SODIUM 100 UG/1
100 TABLET ORAL DAILY
Qty: 90 TABLET | Refills: 0 | Status: SHIPPED | OUTPATIENT
Start: 2025-01-06

## 2025-01-08 ENCOUNTER — CLINICAL SUPPORT (OUTPATIENT)
Dept: FAMILY MEDICINE CLINIC | Facility: CLINIC | Age: 75
End: 2025-01-08
Payer: MEDICARE

## 2025-01-08 DIAGNOSIS — E34.9 TESTOSTERONE DEFICIENCY: Primary | ICD-10-CM

## 2025-01-08 PROCEDURE — 96372 THER/PROPH/DIAG INJ SC/IM: CPT | Performed by: NURSE PRACTITIONER

## 2025-01-08 RX ADMIN — TESTOSTERONE CYPIONATE 200 MG: 200 INJECTION, SOLUTION INTRAMUSCULAR at 09:17

## 2025-02-03 RX ORDER — BUMETANIDE 2 MG/1
2 TABLET ORAL 2 TIMES DAILY PRN
Qty: 180 TABLET | Refills: 0 | Status: SHIPPED | OUTPATIENT
Start: 2025-02-03

## 2025-02-17 ENCOUNTER — CLINICAL SUPPORT (OUTPATIENT)
Dept: FAMILY MEDICINE CLINIC | Facility: CLINIC | Age: 75
End: 2025-02-17
Payer: MEDICARE

## 2025-02-17 DIAGNOSIS — E34.9 TESTOSTERONE DEFICIENCY: Primary | ICD-10-CM

## 2025-02-17 PROCEDURE — 96372 THER/PROPH/DIAG INJ SC/IM: CPT | Performed by: NURSE PRACTITIONER

## 2025-02-17 RX ADMIN — TESTOSTERONE CYPIONATE 200 MG: 200 INJECTION, SOLUTION INTRAMUSCULAR at 10:14

## 2025-03-03 ENCOUNTER — CLINICAL SUPPORT (OUTPATIENT)
Dept: FAMILY MEDICINE CLINIC | Facility: CLINIC | Age: 75
End: 2025-03-03
Payer: MEDICARE

## 2025-03-03 DIAGNOSIS — E34.9 TESTOSTERONE DEFICIENCY: Primary | ICD-10-CM

## 2025-03-03 PROCEDURE — 96372 THER/PROPH/DIAG INJ SC/IM: CPT | Performed by: NURSE PRACTITIONER

## 2025-03-03 RX ADMIN — TESTOSTERONE CYPIONATE 200 MG: 200 INJECTION, SOLUTION INTRAMUSCULAR at 09:17

## 2025-03-17 ENCOUNTER — CLINICAL SUPPORT (OUTPATIENT)
Dept: FAMILY MEDICINE CLINIC | Facility: CLINIC | Age: 75
End: 2025-03-17
Payer: MEDICARE

## 2025-03-17 DIAGNOSIS — E34.9 TESTOSTERONE DEFICIENCY: Primary | ICD-10-CM

## 2025-03-17 PROCEDURE — 96372 THER/PROPH/DIAG INJ SC/IM: CPT | Performed by: NURSE PRACTITIONER

## 2025-03-17 RX ADMIN — TESTOSTERONE CYPIONATE 200 MG: 200 INJECTION, SOLUTION INTRAMUSCULAR at 09:32

## 2025-03-19 DIAGNOSIS — R39.11 BENIGN PROSTATIC HYPERPLASIA WITH URINARY HESITANCY: ICD-10-CM

## 2025-03-19 DIAGNOSIS — N40.1 BENIGN PROSTATIC HYPERPLASIA WITH URINARY HESITANCY: ICD-10-CM

## 2025-03-19 RX ORDER — TAMSULOSIN HYDROCHLORIDE 0.4 MG/1
2 CAPSULE ORAL DAILY
Qty: 180 CAPSULE | Refills: 1 | Status: SHIPPED | OUTPATIENT
Start: 2025-03-19

## 2025-03-31 ENCOUNTER — CLINICAL SUPPORT (OUTPATIENT)
Dept: FAMILY MEDICINE CLINIC | Facility: CLINIC | Age: 75
End: 2025-03-31
Payer: MEDICARE

## 2025-03-31 DIAGNOSIS — E34.9 TESTOSTERONE DEFICIENCY: Primary | ICD-10-CM

## 2025-03-31 PROCEDURE — 96372 THER/PROPH/DIAG INJ SC/IM: CPT | Performed by: NURSE PRACTITIONER

## 2025-03-31 RX ADMIN — TESTOSTERONE CYPIONATE 200 MG: 200 INJECTION, SOLUTION INTRAMUSCULAR at 09:17

## 2025-04-10 RX ORDER — LEVOTHYROXINE SODIUM 100 UG/1
100 TABLET ORAL DAILY
Qty: 90 TABLET | Refills: 0 | Status: SHIPPED | OUTPATIENT
Start: 2025-04-10

## 2025-04-14 ENCOUNTER — CLINICAL SUPPORT (OUTPATIENT)
Dept: FAMILY MEDICINE CLINIC | Facility: CLINIC | Age: 75
End: 2025-04-14
Payer: MEDICARE

## 2025-04-14 DIAGNOSIS — E34.9 TESTOSTERONE DEFICIENCY: Primary | ICD-10-CM

## 2025-04-14 PROCEDURE — 96372 THER/PROPH/DIAG INJ SC/IM: CPT | Performed by: NURSE PRACTITIONER

## 2025-04-14 RX ADMIN — TESTOSTERONE CYPIONATE 200 MG: 200 INJECTION, SOLUTION INTRAMUSCULAR at 09:28

## 2025-04-28 ENCOUNTER — CLINICAL SUPPORT (OUTPATIENT)
Dept: FAMILY MEDICINE CLINIC | Facility: CLINIC | Age: 75
End: 2025-04-28
Payer: MEDICARE

## 2025-04-28 DIAGNOSIS — E34.9 TESTOSTERONE DEFICIENCY: Primary | ICD-10-CM

## 2025-04-28 PROCEDURE — 96372 THER/PROPH/DIAG INJ SC/IM: CPT | Performed by: NURSE PRACTITIONER

## 2025-04-28 RX ADMIN — TESTOSTERONE CYPIONATE 200 MG: 200 INJECTION, SOLUTION INTRAMUSCULAR at 09:22

## 2025-05-05 RX ORDER — BUMETANIDE 2 MG/1
2 TABLET ORAL EVERY 12 HOURS SCHEDULED
Qty: 180 TABLET | Refills: 0 | Status: SHIPPED | OUTPATIENT
Start: 2025-05-05

## 2025-05-05 RX ORDER — PANTOPRAZOLE SODIUM 40 MG/1
40 TABLET, DELAYED RELEASE ORAL DAILY
Qty: 90 TABLET | Refills: 1 | Status: SHIPPED | OUTPATIENT
Start: 2025-05-05

## 2025-05-05 RX ORDER — POTASSIUM CHLORIDE 750 MG/1
20 TABLET, EXTENDED RELEASE ORAL DAILY
Qty: 180 TABLET | Refills: 1 | Status: SHIPPED | OUTPATIENT
Start: 2025-05-05

## 2025-05-12 ENCOUNTER — CLINICAL SUPPORT (OUTPATIENT)
Dept: FAMILY MEDICINE CLINIC | Facility: CLINIC | Age: 75
End: 2025-05-12
Payer: MEDICARE

## 2025-05-12 DIAGNOSIS — E34.9 TESTOSTERONE DEFICIENCY: Primary | ICD-10-CM

## 2025-05-12 PROCEDURE — 96372 THER/PROPH/DIAG INJ SC/IM: CPT | Performed by: NURSE PRACTITIONER

## 2025-05-12 RX ADMIN — TESTOSTERONE CYPIONATE 200 MG: 200 INJECTION, SOLUTION INTRAMUSCULAR at 09:24

## 2025-05-27 ENCOUNTER — CLINICAL SUPPORT (OUTPATIENT)
Dept: FAMILY MEDICINE CLINIC | Facility: CLINIC | Age: 75
End: 2025-05-27
Payer: MEDICARE

## 2025-05-27 DIAGNOSIS — E34.9 TESTOSTERONE DEFICIENCY: Primary | ICD-10-CM

## 2025-05-27 PROCEDURE — 96372 THER/PROPH/DIAG INJ SC/IM: CPT | Performed by: NURSE PRACTITIONER

## 2025-05-27 RX ADMIN — TESTOSTERONE CYPIONATE 200 MG: 200 INJECTION, SOLUTION INTRAMUSCULAR at 10:35

## 2025-05-30 RX ORDER — LANOLIN ALCOHOL/MO/W.PET/CERES
1 CREAM (GRAM) TOPICAL DAILY
Qty: 90 TABLET | Refills: 1 | Status: SHIPPED | OUTPATIENT
Start: 2025-05-30

## 2025-06-02 RX ORDER — ROSUVASTATIN CALCIUM 20 MG/1
20 TABLET, COATED ORAL DAILY
Qty: 90 TABLET | Refills: 1 | Status: SHIPPED | OUTPATIENT
Start: 2025-06-02

## 2025-06-09 ENCOUNTER — CLINICAL SUPPORT (OUTPATIENT)
Dept: FAMILY MEDICINE CLINIC | Facility: CLINIC | Age: 75
End: 2025-06-09
Payer: MEDICARE

## 2025-06-09 DIAGNOSIS — E34.9 TESTOSTERONE DEFICIENCY: Primary | ICD-10-CM

## 2025-06-09 PROCEDURE — 96372 THER/PROPH/DIAG INJ SC/IM: CPT | Performed by: NURSE PRACTITIONER

## 2025-06-09 RX ADMIN — TESTOSTERONE CYPIONATE 200 MG: 200 INJECTION, SOLUTION INTRAMUSCULAR at 09:22

## 2025-06-17 ENCOUNTER — READMISSION MANAGEMENT (OUTPATIENT)
Dept: CALL CENTER | Facility: HOSPITAL | Age: 75
End: 2025-06-17
Payer: MEDICARE

## 2025-06-17 NOTE — OUTREACH NOTE
Prep Survey      Flowsheet Row Responses   [facility] facility patient discharged from? Non-BH   Is LACE score < 7 ? Non-BH Discharge   Eligibility [facility]   Date of Admission 06/15/25   Date of Discharge 06/17/25   Discharge Disposition Home or Self Care   Discharge diagnosis Abnormal stress test   Does the patient have one of the following disease processes/diagnoses(primary or secondary)? Other   Prep survey completed? Yes            Eileen VELARDE - Registered Nurse

## 2025-06-18 ENCOUNTER — TRANSITIONAL CARE MANAGEMENT TELEPHONE ENCOUNTER (OUTPATIENT)
Dept: CALL CENTER | Facility: HOSPITAL | Age: 75
End: 2025-06-18
Payer: MEDICARE

## 2025-06-18 NOTE — OUTREACH NOTE
Call Center TCM Note      Flowsheet Row Responses   [facility] patient discharged from? Non-  [[facility]]   Does the patient have one of the following disease processes/diagnoses(primary or secondary)? Other   TCM attempt successful? Yes   Call start time 1331   Call end time 1336   Discharge diagnosis Abnormal stress test   Meds reviewed with patient/caregiver? Yes   Is the patient having any side effects they believe may be caused by any medication additions or changes? No   Does the patient have all medications ordered at discharge? Yes   Prescription comments Multiple  new medications at this hospital discharge, pt states all are in place   Is the patient taking all medications as directed (includes completed medication regime)? Yes   Comments TCM APPT WITH PCP ANTONIA DELGADO IS 06/26/2025   Does the patient have an appointment with their PCP within 7-14 days of discharge? Yes   Has home health visited the patient within 72 hours of discharge? N/A   Psychosocial issues? No   Did the patient receive a copy of their discharge instructions? Yes   Nursing interventions Reviewed instructions with patient   What is the patient's perception of their health status since discharge? Improving   Is the patient/caregiver able to teach back signs and symptoms related to disease process for when to call PCP? Yes   Is the patient/caregiver able to teach back signs and symptoms related to disease process for when to call 911? Yes   Is the patient/caregiver able to teach back the hierarchy of who to call/visit for symptoms/problems? PCP, Specialist, Home health nurse, Urgent Care, ED, 911 Yes   If the patient is a current smoker, are they able to teach back resources for cessation? Not a smoker   TCM call completed? Yes   Wrap up additional comments D/C DX: Pt feeling unwell, chest pain,  SOB,  heart cath with PCI -Pt still weak, but does feel better. No questions at this time. TCM APPT with PCP ANTONIA Hernandez  Kehinde is 06/26/2025   Call end time 1336            CHRISTIAN MACIAS - Medical Assistant    6/18/2025, 13:39 EDT

## 2025-06-26 ENCOUNTER — OFFICE VISIT (OUTPATIENT)
Dept: FAMILY MEDICINE CLINIC | Facility: CLINIC | Age: 75
End: 2025-06-26
Payer: MEDICARE

## 2025-06-26 ENCOUNTER — CLINICAL SUPPORT (OUTPATIENT)
Dept: FAMILY MEDICINE CLINIC | Facility: CLINIC | Age: 75
End: 2025-06-26
Payer: MEDICARE

## 2025-06-26 VITALS
DIASTOLIC BLOOD PRESSURE: 74 MMHG | HEART RATE: 84 BPM | WEIGHT: 202 LBS | SYSTOLIC BLOOD PRESSURE: 136 MMHG | BODY MASS INDEX: 27.36 KG/M2 | HEIGHT: 72 IN | RESPIRATION RATE: 18 BRPM | OXYGEN SATURATION: 99 %

## 2025-06-26 DIAGNOSIS — I25.5 ISCHEMIC CARDIOMYOPATHY: ICD-10-CM

## 2025-06-26 DIAGNOSIS — I21.4 NSTEMI (NON-ST ELEVATED MYOCARDIAL INFARCTION): ICD-10-CM

## 2025-06-26 DIAGNOSIS — E34.9 TESTOSTERONE DEFICIENCY: Primary | ICD-10-CM

## 2025-06-26 DIAGNOSIS — Z09 HOSPITAL DISCHARGE FOLLOW-UP: Primary | ICD-10-CM

## 2025-06-26 RX ORDER — METOPROLOL SUCCINATE 25 MG/1
25 TABLET, EXTENDED RELEASE ORAL DAILY
Start: 2025-06-26

## 2025-06-26 RX ORDER — BUMETANIDE 2 MG/1
2 TABLET ORAL DAILY
Start: 2025-06-26

## 2025-06-26 RX ADMIN — TESTOSTERONE CYPIONATE 200 MG: 200 INJECTION, SOLUTION INTRAMUSCULAR at 09:15

## 2025-06-26 NOTE — PROGRESS NOTES
"Transitional Care Follow Up Visit  Subjective     Arik August is a 74 y.o. male who presents for a transitional care management visit.    Within 48 business hours after discharge our office contacted him via telephone to coordinate his care and needs.      I reviewed and discussed the details of that call along with the discharge summary, hospital problems, inpatient lab results, inpatient diagnostic studies, and consultation reports with Arik.     Current outpatient and discharge medications have been reconciled for the patient.  Reviewed by: Mary Busby, ANTONIA          6/17/2025     4:57 PM   Date of TCM Phone Call   [facility]   Date of Admission 6/15/2025   Date of Discharge 6/17/2025   Discharge Disposition Home or Self Care     Risk for Readmission (LACE) No data recorded    History of Present Illness   Course During Hospital Stay:  pt comes in today for follow up from recent hospital visit. Went to ER on 6/15 after feeling bad for a couple of weeks with CP. States that Sunday was getting worse. \"Felt like elephant sitting on chest\". HR was 143 in ER and EKG revealed Aflutter.   He has known hx of HTN, HPLD, DM, CKD, RA, CAD, s/p CABG x5 in 2018, PCI x2 2023, and PCI x2 2024, and ischemic cardiomyopathy.   Pt was transferred from [facility] to [facility] with ACS, NSTEMI, and Aflutter w/ RVR.   Pt states he had been having some ongoing CP for a couple of weeks. Would take NTG and it would resolve. Was taking NTG about 3 times/week.   The morning of 6/15, it didn't seem to help and symptoms were worsening. In ER troponin was 39->59.   He was given IV metoprolol to improve HR, but then developed hypotension. IV bolus given.   Started on Hep gtt and transferred to [facility] ICU.  Underwent cath on 6/16. Cath revealed restenosis and stent thrombosis with calcium deposit. Shockwave lithrotripsy was utilized to expand the stent and followed by placement of additional stent.   He is feeling better. " "Has had 2 small episodes of cp post procedure, but not as intense.   He is planning on starting cardiac rehab  No more episodes of Aflutter. He is currently on plavix and eliquis.   BP has been controlled.       The following portions of the patient's history were reviewed and updated as appropriate: allergies, current medications, past family history, past medical history, past social history, past surgical history, and problem list.    Review of Systems    Objective   /74   Pulse 84   Resp 18   Ht 182.9 cm (72.01\")   Wt 91.6 kg (202 lb)   SpO2 99%   BMI 27.39 kg/m²   Physical Exam  Constitutional:       Appearance: He is well-developed.   Eyes:      Pupils: Pupils are equal, round, and reactive to light.   Cardiovascular:      Rate and Rhythm: Normal rate and regular rhythm.   Pulmonary:      Effort: Pulmonary effort is normal.      Breath sounds: Normal breath sounds.   Neurological:      Mental Status: He is alert and oriented to person, place, and time.         Assessment & Plan   Diagnoses and all orders for this visit:    1. Hospital discharge follow-up (Primary)    2. NSTEMI (non-ST elevated myocardial infarction)    3. Ischemic cardiomyopathy    Other orders  -     bumetanide (BUMEX) 2 MG tablet; Take 1 tablet by mouth Daily.  -     metoprolol succinate XL (TOPROL-XL) 25 MG 24 hr tablet; Take 1 tablet by mouth Daily.    Reviewed hospital records   Cont same regimen   Cardiac rehab   During this office visit, we discussed the pertinent aspects of the visit and treatment recommendations. Pt verbalizes understanding. Follow up was discussed. Patient was given the opportunity to ask questions and discuss other concerns.                "

## 2025-07-03 ENCOUNTER — OFFICE VISIT (OUTPATIENT)
Dept: FAMILY MEDICINE CLINIC | Facility: CLINIC | Age: 75
End: 2025-07-03
Payer: MEDICARE

## 2025-07-03 ENCOUNTER — LAB (OUTPATIENT)
Dept: FAMILY MEDICINE CLINIC | Facility: CLINIC | Age: 75
End: 2025-07-03
Payer: MEDICARE

## 2025-07-03 VITALS
RESPIRATION RATE: 18 BRPM | OXYGEN SATURATION: 96 % | HEART RATE: 91 BPM | HEIGHT: 72 IN | DIASTOLIC BLOOD PRESSURE: 80 MMHG | BODY MASS INDEX: 27.25 KG/M2 | SYSTOLIC BLOOD PRESSURE: 132 MMHG | WEIGHT: 201.2 LBS

## 2025-07-03 DIAGNOSIS — Z00.00 MEDICARE ANNUAL WELLNESS VISIT, SUBSEQUENT: Primary | ICD-10-CM

## 2025-07-03 DIAGNOSIS — N52.9 ERECTILE DYSFUNCTION, UNSPECIFIED ERECTILE DYSFUNCTION TYPE: ICD-10-CM

## 2025-07-03 DIAGNOSIS — E34.9 TESTOSTERONE DEFICIENCY: ICD-10-CM

## 2025-07-03 DIAGNOSIS — Z12.5 SCREENING FOR PROSTATE CANCER: ICD-10-CM

## 2025-07-03 DIAGNOSIS — Z00.00 MEDICARE ANNUAL WELLNESS VISIT, SUBSEQUENT: ICD-10-CM

## 2025-07-03 LAB
ALBUMIN SERPL-MCNC: 4.1 G/DL (ref 3.5–5.2)
ALBUMIN UR-MCNC: 5.5 MG/DL
ALBUMIN/GLOB SERPL: 1.2 G/DL
ALP SERPL-CCNC: 66 U/L (ref 39–117)
ALT SERPL W P-5'-P-CCNC: 19 U/L (ref 1–41)
ANION GAP SERPL CALCULATED.3IONS-SCNC: 11 MMOL/L (ref 5–15)
AST SERPL-CCNC: 27 U/L (ref 1–40)
BASOPHILS # BLD AUTO: 0.04 10*3/MM3 (ref 0–0.2)
BASOPHILS NFR BLD AUTO: 0.5 % (ref 0–1.5)
BILIRUB SERPL-MCNC: 0.3 MG/DL (ref 0–1.2)
BUN SERPL-MCNC: 15 MG/DL (ref 8–23)
BUN/CREAT SERPL: 14.4 (ref 7–25)
CALCIUM SPEC-SCNC: 9.5 MG/DL (ref 8.6–10.5)
CHLORIDE SERPL-SCNC: 96 MMOL/L (ref 98–107)
CHOLEST SERPL-MCNC: 136 MG/DL (ref 0–200)
CO2 SERPL-SCNC: 29 MMOL/L (ref 22–29)
CREAT SERPL-MCNC: 1.04 MG/DL (ref 0.76–1.27)
CREAT UR-MCNC: 208.4 MG/DL
DEPRECATED RDW RBC AUTO: 50.8 FL (ref 37–54)
EGFRCR SERPLBLD CKD-EPI 2021: 75.8 ML/MIN/1.73
EOSINOPHIL # BLD AUTO: 0.22 10*3/MM3 (ref 0–0.4)
EOSINOPHIL NFR BLD AUTO: 3 % (ref 0.3–6.2)
ERYTHROCYTE [DISTWIDTH] IN BLOOD BY AUTOMATED COUNT: 17 % (ref 12.3–15.4)
GLOBULIN UR ELPH-MCNC: 3.3 GM/DL
GLUCOSE SERPL-MCNC: 98 MG/DL (ref 65–99)
HBA1C MFR BLD: 6.8 % (ref 4.8–5.6)
HCT VFR BLD AUTO: 40.6 % (ref 37.5–51)
HDLC SERPL-MCNC: 46 MG/DL (ref 40–60)
HGB BLD-MCNC: 12.6 G/DL (ref 13–17.7)
IMM GRANULOCYTES # BLD AUTO: 0.03 10*3/MM3 (ref 0–0.05)
IMM GRANULOCYTES NFR BLD AUTO: 0.4 % (ref 0–0.5)
LDLC SERPL CALC-MCNC: 72 MG/DL (ref 0–100)
LDLC/HDLC SERPL: 1.55 {RATIO}
LYMPHOCYTES # BLD AUTO: 1.11 10*3/MM3 (ref 0.7–3.1)
LYMPHOCYTES NFR BLD AUTO: 15 % (ref 19.6–45.3)
MCH RBC QN AUTO: 26.3 PG (ref 26.6–33)
MCHC RBC AUTO-ENTMCNC: 31 G/DL (ref 31.5–35.7)
MCV RBC AUTO: 84.6 FL (ref 79–97)
MICROALBUMIN/CREAT UR: 26.4 MG/G (ref 0–29)
MONOCYTES # BLD AUTO: 0.72 10*3/MM3 (ref 0.1–0.9)
MONOCYTES NFR BLD AUTO: 9.7 % (ref 5–12)
NEUTROPHILS NFR BLD AUTO: 5.28 10*3/MM3 (ref 1.7–7)
NEUTROPHILS NFR BLD AUTO: 71.4 % (ref 42.7–76)
NRBC BLD AUTO-RTO: 0 /100 WBC (ref 0–0.2)
PLATELET # BLD AUTO: 277 10*3/MM3 (ref 140–450)
PMV BLD AUTO: 9.6 FL (ref 6–12)
POTASSIUM SERPL-SCNC: 4 MMOL/L (ref 3.5–5.2)
PROT SERPL-MCNC: 7.4 G/DL (ref 6–8.5)
PSA SERPL-MCNC: 1.67 NG/ML (ref 0–4)
RBC # BLD AUTO: 4.8 10*6/MM3 (ref 4.14–5.8)
SODIUM SERPL-SCNC: 136 MMOL/L (ref 136–145)
TRIGL SERPL-MCNC: 94 MG/DL (ref 0–150)
TSH SERPL DL<=0.05 MIU/L-ACNC: 1.1 UIU/ML (ref 0.27–4.2)
VLDLC SERPL-MCNC: 18 MG/DL (ref 5–40)
WBC NRBC COR # BLD AUTO: 7.4 10*3/MM3 (ref 3.4–10.8)

## 2025-07-03 PROCEDURE — 83036 HEMOGLOBIN GLYCOSYLATED A1C: CPT | Performed by: NURSE PRACTITIONER

## 2025-07-03 PROCEDURE — G0103 PSA SCREENING: HCPCS | Performed by: NURSE PRACTITIONER

## 2025-07-03 PROCEDURE — 84402 ASSAY OF FREE TESTOSTERONE: CPT | Performed by: NURSE PRACTITIONER

## 2025-07-03 PROCEDURE — 82043 UR ALBUMIN QUANTITATIVE: CPT | Performed by: NURSE PRACTITIONER

## 2025-07-03 PROCEDURE — 82570 ASSAY OF URINE CREATININE: CPT | Performed by: NURSE PRACTITIONER

## 2025-07-03 PROCEDURE — 80053 COMPREHEN METABOLIC PANEL: CPT | Performed by: NURSE PRACTITIONER

## 2025-07-03 PROCEDURE — 80061 LIPID PANEL: CPT | Performed by: NURSE PRACTITIONER

## 2025-07-03 PROCEDURE — 36415 COLL VENOUS BLD VENIPUNCTURE: CPT

## 2025-07-03 PROCEDURE — 84403 ASSAY OF TOTAL TESTOSTERONE: CPT | Performed by: NURSE PRACTITIONER

## 2025-07-03 PROCEDURE — 84443 ASSAY THYROID STIM HORMONE: CPT | Performed by: NURSE PRACTITIONER

## 2025-07-03 PROCEDURE — 85025 COMPLETE CBC W/AUTO DIFF WBC: CPT | Performed by: NURSE PRACTITIONER

## 2025-07-03 RX ORDER — TADALAFIL 20 MG/1
20 TABLET ORAL DAILY PRN
Qty: 30 TABLET | Refills: 0 | Status: SHIPPED | OUTPATIENT
Start: 2025-07-03

## 2025-07-03 RX ORDER — METOPROLOL SUCCINATE 50 MG/1
50 TABLET, EXTENDED RELEASE ORAL DAILY
Qty: 90 TABLET | Refills: 1 | Status: SHIPPED | OUTPATIENT
Start: 2025-07-03

## 2025-07-03 NOTE — PROGRESS NOTES
Subjective   The ABCs of the Annual Wellness Visit  Medicare Wellness Visit      Arik August is a 74 y.o. patient who presents for a Medicare Wellness Visit.    The following portions of the patient's history were reviewed and   updated as appropriate: allergies, current medications, past family history, past medical history, past social history, past surgical history, and problem list.    Compared to one year ago, the patient's physical   health is better.  Compared to one year ago, the patient's mental   health is the same.    Recent Hospitalizations:  He was admitted within the past 365 days at [facility].     Current Medical Providers:  Patient Care Team:  Mary Busby APRN as PCP - General (Nurse Practitioner)  Marvin Mathis MD as Consulting Physician (Rheumatology)  Sven Zendejas MD as Consulting Physician (Orthopedic Surgery)  Bandar So APRN (Nurse Practitioner)  Dionicio Salguero MD as Consulting Physician (Internal Medicine)  Ryan Wallace MD as Consulting Physician (Neurology)  Familia Cary MD as Consulting Physician (Hematology and Oncology)  Dyllan Hull MD as Consulting Physician (Nephrology)    Outpatient Medications Prior to Visit   Medication Sig Dispense Refill    bumetanide (BUMEX) 2 MG tablet Take 1 tablet by mouth Daily.      DAPTOmycin (CUBICIN) 500 MG injection       diphenhydrAMINE (BENADRYL) 25 mg capsule Take 1 capsule by mouth.      folic acid (FOLVITE) 1 MG tablet 2 tablets daily      isosorbide mononitrate (IMDUR) 60 MG 24 hr tablet Take 1 tablet by mouth Daily.      levothyroxine (SYNTHROID, LEVOTHROID) 100 MCG tablet TAKE 1 TABLET BY MOUTH DAILY 90 tablet 0    Magnesium Oxide -Mg Supplement 400 (240 Mg) MG tablet TAKE 1 TABLET BY MOUTH DAILY 90 tablet 1    Melatonin 10 MG capsule Take  by mouth Every Night.      Multiple Vitamins-Minerals (DAILY MULTIVITAMIN) capsule DAILY MULTIVITAMIN CAPS      nitroglycerin (NITROSTAT)  0.3 MG SL tablet Place 1 tablet under the tongue Every 5 (Five) Minutes As Needed.      oxyCODONE-acetaminophen (PERCOCET)  MG per tablet Every 8 (Eight) Hours.      pantoprazole (PROTONIX) 40 MG EC tablet TAKE 1 TABLET BY MOUTH DAILY 90 tablet 1    Plavix 75 MG tablet Take 1 tablet by mouth Daily.      potassium chloride 10 MEQ CR tablet TAKE 2 TABLETS BY MOUTH DAILY 180 tablet 1    rosuvastatin (CRESTOR) 20 MG tablet TAKE 1 TABLET BY MOUTH DAILY 90 tablet 1    tamsulosin (FLOMAX) 0.4 MG capsule 24 hr capsule TAKE 2 CAPSULES BY MOUTH DAILY 180 capsule 1    vitamin B-12 (CYANOCOBALAMIN) 100 MCG tablet Take 0.5 tablets by mouth Daily.      metoprolol succinate XL (TOPROL-XL) 25 MG 24 hr tablet Take 1 tablet by mouth Daily.      potassium chloride (MICRO-K) 10 MEQ CR capsule Take 1 capsule by mouth 2 (Two) Times a Day.      tadalafil (Cialis) 20 MG tablet Take 1 tablet by mouth Daily As Needed for Erectile Dysfunction. 30 tablet 0     Facility-Administered Medications Prior to Visit   Medication Dose Route Frequency Provider Last Rate Last Admin    Testosterone Cypionate (DEPOTESTOTERONE CYPIONATE) injection 200 mg  200 mg Intramuscular Q14 Days Mary Busby, APRN   200 mg at 06/26/25 0915     Opioid medication/s are on active medication list.  and I have evaluated his active treatment plan and pain score trends (see table).  Vitals:    07/03/25 0939   PainSc: 6    PainLoc: Back     I have reviewed the chart for potential of high risk medication and harmful drug interactions in the elderly.        Aspirin is not on active medication list.  Aspirin use is not indicated based on review of current medical condition/s. Risk of harm outweighs potential benefits.  .    Patient Active Problem List   Diagnosis    S/P CABG x 5    Ischemic cardiomyopathy    Deficiency of testosterone biosynthesis    Coronary artery disease involving native coronary artery of native heart without angina pectoris    Mixed hyperlipidemia  "   Essential hypertension    Type 2 diabetes mellitus with hyperglycemia, without long-term current use of insulin    Failed orthopedic implant    Lumbar post-laminectomy syndrome    Myofascial pain    Pseudarthrosis following spinal fusion    Thoracic back pain    Weakness of both legs    Thoracic spine fracture    Pseudoarthrosis of lumbar spine    Hypothyroidism    Cubital tunnel syndrome    Acute blood loss as cause of postoperative anemia    Mechanical complication of orthopedic internal fixation device    Rheumatoid arthritis involving multiple sites with positive rheumatoid factor    Testosterone deficiency    Aortic valve stenosis     Advance Care Planning Advance Directive is not on file.  ACP discussion was held with the patient during this visit. Patient does not have an advance directive, information provided.            Objective   Vitals:    25 0939   BP: 132/80   Pulse: 91   Resp: 18   SpO2: 96%   Weight: 91.3 kg (201 lb 3.2 oz)   Height: 182.9 cm (72.01\")   PainSc: 6    PainLoc: Back       Estimated body mass index is 27.28 kg/m² as calculated from the following:    Height as of this encounter: 182.9 cm (72.01\").    Weight as of this encounter: 91.3 kg (201 lb 3.2 oz).                Does the patient have evidence of cognitive impairment? No                                                                                                Health  Risk Assessment    Smoking Status:  Social History     Tobacco Use   Smoking Status Former    Current packs/day: 0.00    Average packs/day: 1.5 packs/day for 15.0 years (22.5 ttl pk-yrs)    Types: Cigarettes    Start date: 1977    Quit date: 1992    Years since quittin.1    Passive exposure: Never   Smokeless Tobacco Never     Alcohol Consumption:  Social History     Substance and Sexual Activity   Alcohol Use Not Currently    Comment: Occ Beer       Fall Risk Screen  STEADI Fall Risk Assessment was completed, and patient is at LOW risk for " falls.Assessment completed on:7/3/2025    Depression Screening   Little interest or pleasure in doing things? Not at all   Feeling down, depressed, or hopeless? Not at all   PHQ-2 Total Score 0      Health Habits and Functional and Cognitive Screenin/3/2025     9:39 AM   Functional & Cognitive Status   Do you have difficulty preparing food and eating? No   Do you have difficulty bathing yourself, getting dressed or grooming yourself? No   Do you have difficulty using the toilet? No   Do you have difficulty moving around from place to place? No   Do you have trouble with steps or getting out of a bed or a chair? No   Current Diet Limited Junk Food   Dental Exam Up to date   Eye Exam Not up to date   Exercise (times per week) 0 times per week   Current Exercises Include No Regular Exercise   Do you need help using the phone?  No   Are you deaf or do you have serious difficulty hearing?  No   Do you need help to go to places out of walking distance? No   Do you need help shopping? No   Do you need help preparing meals?  No   Do you need help with housework?  No   Do you need help with laundry? No   Do you need help taking your medications? No   Do you need help managing money? No   Do you ever drive or ride in a car without wearing a seat belt? No   Have you felt unusual fatigue (could be tiredness), stress, anger or loneliness in the last month? No   Who do you live with? Spouse   If you need help, do you have trouble finding someone available to you? No   Have you been bothered in the last four weeks by sexual problems? No   Do you have difficulty concentrating, remembering or making decisions? No           Age-appropriate Screening Schedule:  Refer to the list below for future screening recommendations based on patient's age, sex and/or medical conditions. Orders for these recommended tests are listed in the plan section. The patient has been provided with a written plan.    Health Maintenance List  Health  Maintenance   Topic Date Due    URINE MICROALBUMIN-CREATININE RATIO (uACR)  04/03/2025    COVID-19 Vaccine (5 - 2024-25 season) 07/10/2025 (Originally 9/1/2024)    ZOSTER VACCINE (1 of 2) 07/17/2025 (Originally 7/15/2001)    DIABETIC FOOT EXAM  07/18/2025 (Originally 7/15/1960)    DIABETIC EYE EXAM  08/21/2025 (Originally 9/1/2023)    INFLUENZA VACCINE  03/31/2026 (Originally 7/1/2025)    HEMOGLOBIN A1C  12/15/2025    LIPID PANEL  06/16/2026    ANNUAL WELLNESS VISIT  07/03/2026    COLORECTAL CANCER SCREENING  07/30/2029    TDAP/TD VACCINES (2 - Td or Tdap) 03/05/2031    HEPATITIS C SCREENING  Completed    Pneumococcal Vaccine 50+  Completed    AAA SCREEN ONCE  Completed    LUNG CANCER SCREENING  Discontinued                                                                                                                                                CMS Preventative Services Quick Reference  Risk Factors Identified During Encounter  Chronic Pain: Natural history and expected course discussed. Questions answered.  Fall Risk-High or Moderate: Discussed Fall Prevention in the home    The above risks/problems have been discussed with the patient.  Pertinent information has been shared with the patient in the After Visit Summary.  An After Visit Summary and PPPS were made available to the patient.    Follow Up:   Next Medicare Wellness visit to be scheduled in 1 year.             Additional E&M Note during same encounter follows:  Patient has additional, significant, and separately identifiable condition(s)/problem(s) that require work above and beyond the Medicare Wellness Visit     Chief Complaint  Medicare Wellness-subsequent    Subjective   Pt comes in today for routine MWV.   He is having some increased edema in justyna lower legs.  Taking bumex bid.   Recently was hospitalized for CAD and Aflutter.   Getting ready to start cardiac rehab.   Doing better.         JOEY is also being seen today for an annual adult  "preventative physical exam.  and JOEY is also being seen today for additional medical problem/s.                Objective   Vital Signs:  /80   Pulse 91   Resp 18   Ht 182.9 cm (72.01\")   Wt 91.3 kg (201 lb 3.2 oz)   SpO2 96%   BMI 27.28 kg/m²   Physical Exam  Constitutional:       Appearance: He is well-developed.   HENT:      Head: Normocephalic.   Eyes:      Conjunctiva/sclera: Conjunctivae normal.      Pupils: Pupils are equal, round, and reactive to light.   Neck:      Thyroid: No thyromegaly.   Cardiovascular:      Rate and Rhythm: Normal rate and regular rhythm.      Heart sounds: No murmur heard.  Pulmonary:      Effort: Pulmonary effort is normal.      Breath sounds: Normal breath sounds.   Abdominal:      General: Bowel sounds are normal.      Palpations: Abdomen is soft.      Tenderness: There is no abdominal tenderness.   Musculoskeletal:         General: Normal range of motion.      Cervical back: Normal range of motion and neck supple.      Right lower le+ Pitting Edema present.      Left lower le+ Pitting Edema present.   Skin:     General: Skin is warm and dry.      Findings: No lesion.   Neurological:      Mental Status: He is alert and oriented to person, place, and time.   Psychiatric:         Behavior: Behavior normal.                      /80   Pulse 91   Resp 18   Ht 182.9 cm (72.01\")   Wt 91.3 kg (201 lb 3.2 oz)   SpO2 96%   BMI 27.28 kg/m²   BP Readings from Last 3 Encounters:   25 132/80   25 136/74   10/10/24 109/69      Wt Readings from Last 3 Encounters:   25 91.3 kg (201 lb 3.2 oz)   25 91.6 kg (202 lb)   24 95.3 kg (210 lb)        Assessment and Plan      Medicare annual wellness visit, subsequent    Orders:    CBC & Differential; Future    Comprehensive Metabolic Panel; Future    Hemoglobin A1c; Future    Lipid Panel; Future    PSA Screen; Future    TSH; Future    Testosterone (Free & Total), LC / MS; Future    Microalbumin / " Creatinine Urine Ratio - Urine, Clean Catch    Testosterone deficiency    Orders:    Testosterone (Free & Total), LC / MS; Future    Screening for prostate cancer    Orders:    PSA Screen; Future    Erectile dysfunction, unspecified erectile dysfunction type    Orders:    tadalafil (Cialis) 20 MG tablet; Take 1 tablet by mouth Daily As Needed for Erectile Dysfunction.     Check labs  Refill meds  Follow up with cardiology  May need to see EP specialist as well  During this visit for their annual exam, we reviewed their personal history, social history and family history. We went over their medications and all the recommended health maintenance items for their age group. They were given the opportunity to ask questions and discuss other concerns.          Follow Up   Return in about 6 months (around 1/3/2026).  Patient was given instructions and counseling regarding his condition or for health maintenance advice. Please see specific information pulled into the AVS if appropriate.

## 2025-07-07 ENCOUNTER — CLINICAL SUPPORT (OUTPATIENT)
Dept: FAMILY MEDICINE CLINIC | Facility: CLINIC | Age: 75
End: 2025-07-07
Payer: MEDICARE

## 2025-07-07 DIAGNOSIS — E34.9 TESTOSTERONE DEFICIENCY: Primary | ICD-10-CM

## 2025-07-07 PROCEDURE — 96372 THER/PROPH/DIAG INJ SC/IM: CPT | Performed by: NURSE PRACTITIONER

## 2025-07-07 RX ADMIN — TESTOSTERONE CYPIONATE 200 MG: 200 INJECTION, SOLUTION INTRAMUSCULAR at 09:32

## 2025-07-08 LAB
TESTOST FREE SERPL-MCNC: 17 PG/ML (ref 6.6–18.1)
TESTOST SERPL-MCNC: 956.9 NG/DL (ref 264–916)

## 2025-07-11 RX ORDER — LEVOTHYROXINE SODIUM 100 UG/1
100 TABLET ORAL DAILY
Qty: 90 TABLET | Refills: 0 | Status: SHIPPED | OUTPATIENT
Start: 2025-07-11

## 2025-08-01 RX ORDER — BUMETANIDE 2 MG/1
2 TABLET ORAL EVERY 12 HOURS SCHEDULED
Qty: 180 TABLET | Refills: 1 | Status: SHIPPED | OUTPATIENT
Start: 2025-08-01

## 2025-08-05 ENCOUNTER — CLINICAL SUPPORT (OUTPATIENT)
Dept: FAMILY MEDICINE CLINIC | Facility: CLINIC | Age: 75
End: 2025-08-05
Payer: MEDICARE

## 2025-08-05 DIAGNOSIS — E34.9 TESTOSTERONE DEFICIENCY: Primary | ICD-10-CM

## 2025-08-05 PROCEDURE — 96372 THER/PROPH/DIAG INJ SC/IM: CPT | Performed by: NURSE PRACTITIONER

## 2025-08-05 RX ADMIN — TESTOSTERONE CYPIONATE 200 MG: 200 INJECTION, SOLUTION INTRAMUSCULAR at 13:27
